# Patient Record
Sex: FEMALE | Race: BLACK OR AFRICAN AMERICAN | NOT HISPANIC OR LATINO | URBAN - METROPOLITAN AREA
[De-identification: names, ages, dates, MRNs, and addresses within clinical notes are randomized per-mention and may not be internally consistent; named-entity substitution may affect disease eponyms.]

---

## 2023-01-11 ENCOUNTER — INPATIENT (INPATIENT)
Facility: HOSPITAL | Age: 53
LOS: 57 days | Discharge: NURSING, REHAB OR LONG-TERM CARE | End: 2023-03-10
Attending: HOSPITALIST | Admitting: HOSPITALIST
Payer: MEDICAID

## 2023-01-11 VITALS
DIASTOLIC BLOOD PRESSURE: 92 MMHG | OXYGEN SATURATION: 100 % | SYSTOLIC BLOOD PRESSURE: 173 MMHG | TEMPERATURE: 98 F | HEART RATE: 85 BPM | RESPIRATION RATE: 16 BRPM

## 2023-01-11 PROCEDURE — 99285 EMERGENCY DEPT VISIT HI MDM: CPT

## 2023-01-11 NOTE — ED PROVIDER NOTE - PROGRESS NOTE DETAILS
Dr. Oscar Gonzales DO (ED ATTENDING):    I received signout on from this patient from Dr. York–53-year-old female with history of diabetes and bipolar disorder on lithium, brought in by EMS due to strange behavior, currently awaiting further collateral information to determine disposition.  I spoke with patient's cousin–Armaan (phone number [phone]), who said patient lives with her mother who is now , mother was the patient's primary caretaker and dispenses all of her medications to her.  Patient's father is  and patient has no siblings.  He is the most involved family member but says he does not think he is able to care for her at this time and is worried about the patient's ability to care for herself.  Cousin says that patient has been intermittently screaming with bizarre behavior, disturbing the neighbors and acting erratic and irritable at times.  On exam patient says she has not taken her medications since 2022, denies any SI or hallucinations at this time.    Psychiatry team called due to concern for patient's ability to care for herself due to psychiatric illness. Dr. Oscar Gonzales DO (ED ATTENDING):  patient seen by psych team and recommending admission for placement and restarting psych medications.  Labs showing Creatinine of 1.4- unclear if acute or chronic. Given PO fluids. Psych recommending trending Creatinine prior to restarting Lithium versus starting a new agent    TSH also elevated but clinically not in myxedema coma- no bradycardia, electrolyte abnormalities. Likely decompensated psych is primary issue    Will admit to medicine for further treatment and management of elevated Creatinine and further thyroid testing. psych to follow as consulting team

## 2023-01-11 NOTE — ED PROVIDER NOTE - PHYSICAL EXAMINATION
GEN: no acute respiratory distress. nontoxic, speaking comfortably in full sentences  HEENT: NCAT. face symmetrical. PERRL 4mm, EOMI, MMM, oropharynx wnl.  Neck: no JVD, trachea midline, no LAD  CV: RRR. +S1S2, no murmur. 2+ pulses in 4 extremities, cap refill <2 sec  Chest: CTA B/l. no wheezing, rales, rhonchi. no retractions. good air movement.   ABD: +BS, soft, non distended, non tender.   : no cva or suprapubic tenderness  MSK: No clubbing, cyanosis, edema. FROM of all extremities. no tenderness to palpation. No midline or paraspinal tenderness.   Neuro: AAOX3. Sensation intact, motor 5/5 throughout.   SKIN: No erythema, lesions or rash    flat affect, minimal conversant, no SI HI, hallucination, calm and cooperative

## 2023-01-11 NOTE — ED PROVIDER NOTE - CLINICAL SUMMARY MEDICAL DECISION MAKING FREE TEXT BOX
Patient calm in ED reportedly had abnormal behavior at home.  Per EMS no concerns in transport.  Patient's mother  earlier this evening.  Per EMS family in route to ED will obtain collateral when they arrive reassess.  As of now in no acute psychiatric concerns warranting emergent consultation or psychiatric admission.

## 2023-01-11 NOTE — ED PROVIDER NOTE - NS ED ROS FT
Constitutional: No fever. no chills.   Eyes: No visual changes, eye pain or redness  HEENT: No throat pain  CV: No chest pain, no palpitations  Resp: No shortness of breath, no cough  GI: No abdominal pain. No nausea. no  vomiting.   : No dysuria, hematuria. no urinary frequency, no urinary urgency.  MSK: No musculoskeletal pain  Skin: No rash  Neuro: No headache. No numbness or tingling. No weakness. No dizziness.  Allergy/Immunology: no allergy to medicine

## 2023-01-11 NOTE — ED ADULT TRIAGE NOTE - CHIEF COMPLAINT QUOTE
Pt. is brought to [facility] ED by NOREEN( [address] [facility]) for psychiatric issue. Pt.'s mother passed away at 1901. EMS states pt. was upset. They left and [facility] called EMS to come back. Family states that pt. was acting different. Brothers are on their way. Family states that the mother would give the pt. her medications. Pt. is non-verbal at triage. Unable to obtain history. . Pt. is on metformin. Spoke to  STEPHANIE Bass. Refering to adult main ED. Pt. is brought to [facility] ED by NOREEN( [address] [facility]) for psychiatric issue. Pt.'s mother passed away at 1901. EMS states pt. was upset. They left and [facility] called EMS to come back. Family states that pt. was acting different. Brothers are on their way. Family states that the mother would give the pt. her medications. Pt. is non-verbal at triage. Unable to obtain history.  at scene. Repeat  at triage.  Pt. is on metformin. Spoke to  STEPHANIE Bass. Refering to adult main ED.

## 2023-01-11 NOTE — ED PROVIDER NOTE - CARE PLAN
Principal Discharge DX:	Abnormal behavior   1 Principal Discharge DX:	Abnormal behavior  Secondary Diagnosis:	Elevated serum creatinine

## 2023-01-12 DIAGNOSIS — F31.62 BIPOLAR DISORDER, CURRENT EPISODE MIXED, MODERATE: ICD-10-CM

## 2023-01-12 DIAGNOSIS — E78.5 HYPERLIPIDEMIA, UNSPECIFIED: ICD-10-CM

## 2023-01-12 DIAGNOSIS — E03.9 HYPOTHYROIDISM, UNSPECIFIED: ICD-10-CM

## 2023-01-12 DIAGNOSIS — N17.9 ACUTE KIDNEY FAILURE, UNSPECIFIED: ICD-10-CM

## 2023-01-12 DIAGNOSIS — R46.89 OTHER SYMPTOMS AND SIGNS INVOLVING APPEARANCE AND BEHAVIOR: ICD-10-CM

## 2023-01-12 DIAGNOSIS — E11.9 TYPE 2 DIABETES MELLITUS WITHOUT COMPLICATIONS: ICD-10-CM

## 2023-01-12 DIAGNOSIS — Z29.9 ENCOUNTER FOR PROPHYLACTIC MEASURES, UNSPECIFIED: ICD-10-CM

## 2023-01-12 LAB
A1C WITH ESTIMATED AVERAGE GLUCOSE RESULT: 5.5 % — SIGNIFICANT CHANGE UP (ref 4–5.6)
ALBUMIN SERPL ELPH-MCNC: 4.1 G/DL — SIGNIFICANT CHANGE UP (ref 3.3–5)
ALP SERPL-CCNC: 123 U/L — HIGH (ref 40–120)
ALT FLD-CCNC: 14 U/L — SIGNIFICANT CHANGE UP (ref 4–33)
ANION GAP SERPL CALC-SCNC: 12 MMOL/L — SIGNIFICANT CHANGE UP (ref 7–14)
APAP SERPL-MCNC: <10 UG/ML — LOW (ref 15–25)
AST SERPL-CCNC: 25 U/L — SIGNIFICANT CHANGE UP (ref 4–32)
BASOPHILS # BLD AUTO: 0.06 K/UL — SIGNIFICANT CHANGE UP (ref 0–0.2)
BASOPHILS NFR BLD AUTO: 0.8 % — SIGNIFICANT CHANGE UP (ref 0–2)
BILIRUB SERPL-MCNC: <0.2 MG/DL — SIGNIFICANT CHANGE UP (ref 0.2–1.2)
BUN SERPL-MCNC: 10 MG/DL — SIGNIFICANT CHANGE UP (ref 7–23)
CALCIUM SERPL-MCNC: 9.8 MG/DL — SIGNIFICANT CHANGE UP (ref 8.4–10.5)
CHLORIDE SERPL-SCNC: 113 MMOL/L — HIGH (ref 98–107)
CO2 SERPL-SCNC: 21 MMOL/L — LOW (ref 22–31)
CORTIS AM PEAK SERPL-MCNC: 9.4 UG/DL — SIGNIFICANT CHANGE UP (ref 6–18.4)
CREAT SERPL-MCNC: 1.41 MG/DL — HIGH (ref 0.5–1.3)
EGFR: 45 ML/MIN/1.73M2 — LOW
EOSINOPHIL # BLD AUTO: 0.36 K/UL — SIGNIFICANT CHANGE UP (ref 0–0.5)
EOSINOPHIL NFR BLD AUTO: 4.8 % — SIGNIFICANT CHANGE UP (ref 0–6)
ESTIMATED AVERAGE GLUCOSE: 111 — SIGNIFICANT CHANGE UP
ETHANOL SERPL-MCNC: <10 MG/DL — SIGNIFICANT CHANGE UP
FLUAV AG NPH QL: SIGNIFICANT CHANGE UP
FLUBV AG NPH QL: SIGNIFICANT CHANGE UP
GLUCOSE BLDC GLUCOMTR-MCNC: 110 MG/DL — HIGH (ref 70–99)
GLUCOSE BLDC GLUCOMTR-MCNC: 221 MG/DL — HIGH (ref 70–99)
GLUCOSE SERPL-MCNC: 98 MG/DL — SIGNIFICANT CHANGE UP (ref 70–99)
HCT VFR BLD CALC: 37.1 % — SIGNIFICANT CHANGE UP (ref 34.5–45)
HGB BLD-MCNC: 11.9 G/DL — SIGNIFICANT CHANGE UP (ref 11.5–15.5)
IANC: 5.07 K/UL — SIGNIFICANT CHANGE UP (ref 1.8–7.4)
IMM GRANULOCYTES NFR BLD AUTO: 0.3 % — SIGNIFICANT CHANGE UP (ref 0–0.9)
LITHIUM SERPL-MCNC: <0.1 MMOL/L — LOW (ref 0.6–1.2)
LYMPHOCYTES # BLD AUTO: 1.49 K/UL — SIGNIFICANT CHANGE UP (ref 1–3.3)
LYMPHOCYTES # BLD AUTO: 20.1 % — SIGNIFICANT CHANGE UP (ref 13–44)
MCHC RBC-ENTMCNC: 27.9 PG — SIGNIFICANT CHANGE UP (ref 27–34)
MCHC RBC-ENTMCNC: 32.1 GM/DL — SIGNIFICANT CHANGE UP (ref 32–36)
MCV RBC AUTO: 86.9 FL — SIGNIFICANT CHANGE UP (ref 80–100)
MONOCYTES # BLD AUTO: 0.43 K/UL — SIGNIFICANT CHANGE UP (ref 0–0.9)
MONOCYTES NFR BLD AUTO: 5.8 % — SIGNIFICANT CHANGE UP (ref 2–14)
NEUTROPHILS # BLD AUTO: 5.07 K/UL — SIGNIFICANT CHANGE UP (ref 1.8–7.4)
NEUTROPHILS NFR BLD AUTO: 68.2 % — SIGNIFICANT CHANGE UP (ref 43–77)
NRBC # BLD: 0 /100 WBCS — SIGNIFICANT CHANGE UP (ref 0–0)
NRBC # FLD: 0 K/UL — SIGNIFICANT CHANGE UP (ref 0–0)
PLATELET # BLD AUTO: 283 K/UL — SIGNIFICANT CHANGE UP (ref 150–400)
POTASSIUM SERPL-MCNC: 3.6 MMOL/L — SIGNIFICANT CHANGE UP (ref 3.5–5.3)
POTASSIUM SERPL-SCNC: 3.6 MMOL/L — SIGNIFICANT CHANGE UP (ref 3.5–5.3)
PROT SERPL-MCNC: 7.2 G/DL — SIGNIFICANT CHANGE UP (ref 6–8.3)
RBC # BLD: 4.27 M/UL — SIGNIFICANT CHANGE UP (ref 3.8–5.2)
RBC # FLD: 13.6 % — SIGNIFICANT CHANGE UP (ref 10.3–14.5)
RSV RNA NPH QL NAA+NON-PROBE: SIGNIFICANT CHANGE UP
SALICYLATES SERPL-MCNC: <0.3 MG/DL — LOW (ref 15–30)
SARS-COV-2 RNA SPEC QL NAA+PROBE: SIGNIFICANT CHANGE UP
SODIUM SERPL-SCNC: 146 MMOL/L — HIGH (ref 135–145)
TOXICOLOGY SCREEN, DRUGS OF ABUSE, SERUM RESULT: SIGNIFICANT CHANGE UP
TSH SERPL-MCNC: 410 UIU/ML — HIGH (ref 0.27–4.2)
WBC # BLD: 7.43 K/UL — SIGNIFICANT CHANGE UP (ref 3.8–10.5)
WBC # FLD AUTO: 7.43 K/UL — SIGNIFICANT CHANGE UP (ref 3.8–10.5)

## 2023-01-12 PROCEDURE — 99255 IP/OBS CONSLTJ NEW/EST HI 80: CPT | Mod: GC

## 2023-01-12 PROCEDURE — 99223 1ST HOSP IP/OBS HIGH 75: CPT | Mod: GC

## 2023-01-12 PROCEDURE — 99285 EMERGENCY DEPT VISIT HI MDM: CPT | Mod: GC

## 2023-01-12 RX ORDER — DEXTROSE 50 % IN WATER 50 %
15 SYRINGE (ML) INTRAVENOUS ONCE
Refills: 0 | Status: DISCONTINUED | OUTPATIENT
Start: 2023-01-12 | End: 2023-01-20

## 2023-01-12 RX ORDER — GLUCAGON INJECTION, SOLUTION 0.5 MG/.1ML
1 INJECTION, SOLUTION SUBCUTANEOUS ONCE
Refills: 0 | Status: DISCONTINUED | OUTPATIENT
Start: 2023-01-12 | End: 2023-01-20

## 2023-01-12 RX ORDER — DEXTROSE 50 % IN WATER 50 %
25 SYRINGE (ML) INTRAVENOUS ONCE
Refills: 0 | Status: DISCONTINUED | OUTPATIENT
Start: 2023-01-12 | End: 2023-01-20

## 2023-01-12 RX ORDER — HYDROCORTISONE 20 MG
100 TABLET ORAL ONCE
Refills: 0 | Status: DISCONTINUED | OUTPATIENT
Start: 2023-01-12 | End: 2023-01-12

## 2023-01-12 RX ORDER — INSULIN LISPRO 100/ML
VIAL (ML) SUBCUTANEOUS AT BEDTIME
Refills: 0 | Status: DISCONTINUED | OUTPATIENT
Start: 2023-01-12 | End: 2023-01-20

## 2023-01-12 RX ORDER — ACETAMINOPHEN 500 MG
650 TABLET ORAL EVERY 6 HOURS
Refills: 0 | Status: DISCONTINUED | OUTPATIENT
Start: 2023-01-12 | End: 2023-03-10

## 2023-01-12 RX ORDER — HALOPERIDOL DECANOATE 100 MG/ML
5 INJECTION INTRAMUSCULAR EVERY 6 HOURS
Refills: 0 | Status: DISCONTINUED | OUTPATIENT
Start: 2023-01-12 | End: 2023-01-12

## 2023-01-12 RX ORDER — LEVOTHYROXINE SODIUM 125 MCG
1 TABLET ORAL
Qty: 0 | Refills: 0 | DISCHARGE

## 2023-01-12 RX ORDER — SODIUM CHLORIDE 9 MG/ML
1000 INJECTION, SOLUTION INTRAVENOUS
Refills: 0 | Status: DISCONTINUED | OUTPATIENT
Start: 2023-01-12 | End: 2023-01-20

## 2023-01-12 RX ORDER — HYDROCORTISONE 20 MG
50 TABLET ORAL EVERY 8 HOURS
Refills: 0 | Status: DISCONTINUED | OUTPATIENT
Start: 2023-01-12 | End: 2023-01-12

## 2023-01-12 RX ORDER — INSULIN LISPRO 100/ML
VIAL (ML) SUBCUTANEOUS
Refills: 0 | Status: DISCONTINUED | OUTPATIENT
Start: 2023-01-12 | End: 2023-01-20

## 2023-01-12 RX ORDER — LEVOTHYROXINE SODIUM 125 MCG
200 TABLET ORAL ONCE
Refills: 0 | Status: COMPLETED | OUTPATIENT
Start: 2023-01-12 | End: 2023-01-12

## 2023-01-12 RX ORDER — HYDROCORTISONE 20 MG
50 TABLET ORAL EVERY 8 HOURS
Refills: 0 | Status: COMPLETED | OUTPATIENT
Start: 2023-01-12 | End: 2023-01-13

## 2023-01-12 RX ORDER — LEVOTHYROXINE SODIUM 125 MCG
100 TABLET ORAL
Refills: 0 | Status: DISCONTINUED | OUTPATIENT
Start: 2023-01-13 | End: 2023-01-16

## 2023-01-12 RX ORDER — HALOPERIDOL DECANOATE 100 MG/ML
2 INJECTION INTRAMUSCULAR EVERY 6 HOURS
Refills: 0 | Status: DISCONTINUED | OUTPATIENT
Start: 2023-01-12 | End: 2023-02-27

## 2023-01-12 RX ORDER — DEXTROSE 50 % IN WATER 50 %
12.5 SYRINGE (ML) INTRAVENOUS ONCE
Refills: 0 | Status: DISCONTINUED | OUTPATIENT
Start: 2023-01-12 | End: 2023-01-20

## 2023-01-12 RX ORDER — HYDROCORTISONE 20 MG
100 TABLET ORAL ONCE
Refills: 0 | Status: COMPLETED | OUTPATIENT
Start: 2023-01-12 | End: 2023-01-12

## 2023-01-12 RX ADMIN — Medication 50 MILLIGRAM(S): at 22:06

## 2023-01-12 RX ADMIN — Medication 100 MILLIGRAM(S): at 14:22

## 2023-01-12 RX ADMIN — HALOPERIDOL DECANOATE 2 MILLIGRAM(S): 100 INJECTION INTRAMUSCULAR at 18:34

## 2023-01-12 RX ADMIN — Medication 200 MICROGRAM(S): at 18:08

## 2023-01-12 NOTE — H&P ADULT - HISTORY OF PRESENT ILLNESS
Ms. Alejandro is a 51 y/o F w/ PMH T2DM, bipolar (on lithium, domiciled w/ mother) who was BIBEMS for abnormal behavior reported by cousin. Pt's cousin Armaan reports that  Ms. Alejandro is a 53 y/o F w/ PMH T2DM, bipolar (on lithium, domiciled w/ mother) who was BIBEMS for abnormal behavior reported by cousin. [employer] from Pt's cousin Armaan. [employer] called cousin because they tried to drop off food at house but pt/mom not answering door. Armaan called pt about this who said pt's mom is "resting." Armaan then went in to check on house on , found pt's mom dead (likely  for 3 days) with pt bringing her blankets and pillows.    reports that he was called by [employer] because pt was not responding for meal drop off. Armaan stopped by house and noticed pt behaving irregularly. Pt  Ms. Alejandro is a 53 y/o F w/ PMH T2DM, bipolar (on lithium, domiciled w/ mother) who was BIBEMS for abnormal behavior reported by cousin. [employer] from Pt's cousin Armaan. [employer] called cousin because they tried to drop off food at house but pt/mom not answering door. Armaan called pt about this who said pt's mom is "resting." Armaan then went in to check on house on , found pt's mom dead (likely  for 3 days) with pt bringing her blankets and pillows. Armaan reports pt has outbursts of frustration but has never been aggressive or violent. Pt had a similar outburst after [employer] arrived yesterday. Cousin reports pt was giggling that mom was "resting" and that she did not grasp that mom was no longer alive. Per cousin, pt follows Dr. Jose Goins for her primary care needs. Armaan reports that he usually brings pt to doctor and recalls pt having a thyroid condition for which she takes a medication. Armaan is not sure when patient stopped taking her medications. Pt denies recent changes in weight, cold/heat intolerance, constipation, excessive fatigue, dry/brittle hair, hair loss.     Pt presented to the ED w/ 's-170's / 90's; HR 80's. Afebrile. On RA. Pt found to have elevated TSH to 410 w/ low T3 and fT4. Pt was evaluated by psych and recommended for inpatient psych treatment due to inability to care for ADL's; however, admitted to medicine first for abnormal TFT's.  Ms. Alejandro is a 53 y/o F w/ PMH T2DM, bipolar (on lithium, domiciled w/ mother) who was BIBEMS for abnormal behavior reported by cousin. [employer] from Pt's cousin Armaan. [employer] called cousin because they tried to drop off food at house but pt/mom not answering door. Armaan called pt about this who said pt's mom is "resting." Armaan then went in to check on house on , found pt's mom dead (likely  for 3 days) with pt bringing her blankets and pillows. Armaan reports pt has outbursts of frustration but has never been aggressive or violent. He reports despite psych history patient is normally oriented to person, place, time. Pt had a similar outburst after [facility] arrived yesterday. Cousin reports pt was giggling that mom was "resting" and that she did not grasp that mom was no longer alive. Per cousin, pt follows Dr. Jose Goins for her primary care needs. Armaan reports that he usually brings pt to doctor and recalls pt having a thyroid condition for which she takes a medication. Armaan is not sure when patient stopped taking her medications. Pt denies recent changes in weight, cold/heat intolerance, constipation, excessive fatigue, dry/brittle hair, hair loss.     Pt presented to the ED w/ 's-170's / 90's; HR 80's. Afebrile. On RA. Pt found to have elevated TSH to 410 w/ low T3 and fT4. Pt was evaluated by psych and recommended for inpatient psych treatment due to inability to care for ADL's; however, admitted to medicine first for abnormal TFT's.  Ms. Alejandro is a 53 y/o F w/ PMH T2DM, bipolar (on lithium, domiciled w/ mother) who was BIBEMS for abnormal behavior reported by cousin. [employer] from Pt's cousin Armaan. [employer] called cousin because they tried to drop off food at house but pt/mom not answering door. Armaan called pt about this who said pt's mom is "resting." Armaan then went in to check on house on , found pt's mom dead (likely  for 3 days) with pt bringing her blankets and pillows. Armaan reports pt has outbursts of frustration but has never been aggressive or violent. He reports despite psych history patient is normally oriented to person, place, time. Pt had a similar outburst after [facility] arrived yesterday. Cousin reports pt was giggling that mom was "resting" and that she did not grasp that mom was no longer alive. Per cousin, pt follows Dr. Jose Goins for her primary care needs. Armaan reports that he usually brings pt to doctor and recalls pt having a thyroid condition for which she takes a medication. Armaan is not sure when patient stopped taking her medications. Pt unreliable historian. Appears to be AOx0 on examination. When asked, pt denies recent changes in weight, cold/heat intolerance, constipation, excessive fatigue, dry/brittle hair, hair loss.     Pt presented to the ED w/ 's-170's / 90's; HR 80's. Afebrile. On RA. Pt found to have elevated TSH to 410 w/ low T3 and fT4. Pt was evaluated by psych and recommended for inpatient psych treatment due to inability to care for ADL's; however, admitted to medicine first for abnormal TFT's.

## 2023-01-12 NOTE — ED BEHAVIORAL HEALTH NOTE - BEHAVIORAL HEALTH NOTE
COVID Exposure Screen- Patient    1. *Have you had a COVID-19 test in the last 90 days? ( ) Yes (x ) No ( ) Unknown- Reason: _____    IF YES PROCEED TO QUESTION #2. IF NO OR UNKNOWN, PLEASE SKIP TO QUESTION #3.    2. Date of test(s) and result(s): ________    3. *Have you tested positive for COVID-19 antibodies? ( ) Yes ( ) No (x ) Unknown- Reason: _____    IF YES PROCEED TO QUESTION #4. IF NO or UNKNOWN, PLEASE SKIP TO QUESTION #5.    4. Date of positive antibody test: ________    5. *Have you received 2 doses of the COVID-19 vaccine? x( ) Yes ( ) No ( ) Unknown- Reason: _____    IF YES PROCEED TO QUESTION #6. IF NO or UNKNOWN, PLEASE SKIP TO QUESTION #7.    6. Date of second dose: ________    7. *In the past 10 days, have you been around anyone with a positive COVID-19 test?* ( ) Yes (x ) No ( ) Unknown- Reason: ____    IF YES PROCEED TO QUESTION #8. IF NO or UNKNOWN, PLEASE SKIP TO QUESTION #13.    8. Were you within 6 feet of them for at least 15 minutes? ( ) Yes ( ) No ( ) Unknown- Reason: _____    9. Have you provided care for them? ( ) Yes ( ) No ( ) Unknown- Reason: ______    10. Have you had direct physical contact with them (touched, hugged, or kissed them)? ( ) Yes ( ) No ( ) Unknown- Reason: _____    11. Have you shared eating or drinking utensils with them? ( ) Yes ( ) No ( ) Unknown- Reason: ____    12. Have they sneezed, coughed, or somehow gotten respiratory droplets on you? ( ) Yes ( ) No ( ) Unknown- Reason: ______    13. *Have you been out of [place] within the past 10 days?* ( ) Yes ( x) No ( ) Unknown- Reason: _____    IF YES PLEASE ANSWER THE FOLLOWING QUESTIONS:    14. Which state/country have you been to? ______    15. Were you there over 24 hours? ( ) Yes ( ) No ( ) Unknown- Reason: ______    16. Date of return to [facility]: ______

## 2023-01-12 NOTE — H&P ADULT - NSHPREVIEWOFSYSTEMS_GEN_ALL_CORE
REVIEW OF SYSTEMS:    *Pt unreliable historian  CONSTITUTIONAL: No weakness, fevers or chills. No cold/heat intolerance. No   EYES/ENT: No visual changes;  No vertigo or throat pain   NECK: No pain or stiffness  RESPIRATORY: No cough, wheezing, hemoptysis; No shortness of breath  CARDIOVASCULAR: No chest pain or palpitations  GASTROINTESTINAL: No abdominal or epigastric pain. No nausea, vomiting, or hematemesis; No diarrhea or constipation. No melena or hematochezia.  GENITOURINARY: No dysuria, frequency or hematuria  NEUROLOGICAL: No numbness or weakness  SKIN: No itching, rashes

## 2023-01-12 NOTE — ED BEHAVIORAL HEALTH ASSESSMENT NOTE - DESCRIPTION
per chart review, diabetes Pt calm and cooperative in ED Lived in home with mom, now . Per cousin, pt has no siblings or children. Her father passed away a long time ago. Pt's mom was her caretaker, pt unable to live on own. Pt calm and cooperative in ED, but is oddly related  Vital Signs Last 24 Hrs  T(C): 36.9 (12 Jan 2023 07:19), Max: 36.9 (12 Jan 2023 07:19)  T(F): 98.5 (12 Jan 2023 07:19), Max: 98.5 (12 Jan 2023 07:19)  HR: 85 (12 Jan 2023 07:19) (69 - 85)  BP: 168/93 (12 Jan 2023 07:19) (166/96 - 173/92)  BP(mean): --  RR: 17 (12 Jan 2023 07:19) (16 - 18)  SpO2: 100% (12 Jan 2023 07:19) (100% - 100%)    Parameters below as of 12 Jan 2023 07:19  Patient On (Oxygen Delivery Method): room air

## 2023-01-12 NOTE — ED ADULT NURSE REASSESSMENT NOTE - NS ED NURSE REASSESS COMMENT FT1
#22g placed to L wrist. Patient tolerated procedure well. Patient denies chest pain, headache and dizziness. Calm and cooperative at this time.
pt given gown to change into. pt went to restroom to change and clothing placed in belongings bags. pt has purse in possession. pt able to be redirected, but ambulates freely throughout department without telling staff. per MD Gonzales and psych no need for constant observation at this time
Patient at baseline mental status. Breathing even and unlabored. No pallor or diaphoresis noted at this time. Patient IV placed and medications given as per MD order. Awaiting to give report.
Patient at baseline mental status. Breathing even and unlabored. Noted to have flat affect however, calm and cooperative at this time. Patient awaiting bed placement.

## 2023-01-12 NOTE — CONSULT NOTE ADULT - ATTENDING COMMENTS
52F with severe hypothyroidism, impending myxedema coma but VS not grossly abnormal, however patient somewhat lethargic.  TFTs with severely high TSH and undetectable T4/T3.  Send STAT lab for cortisol, followed by a dose of IV hydrocortisone (follow up cortisol level to determine need for ongoing steroid). Then give levothyroxine 200mg IV x 1. Will follow closely.  High risk patient high level decision making.    Alvaro Jonas MD  Division of Endocrinology  Pager: [phone]    If after 6PM or before 9AM, or on weekends/holidays, please call endocrine answering service for assistance ([phone]).  For nonurgent matters email [email or web address] for assistance.

## 2023-01-12 NOTE — ED BEHAVIORAL HEALTH ASSESSMENT NOTE - NS ED BHA PLAN MSU PSYCHIATRIC ISSUES2 FT
Would hold lithium now, consider switching to another mood stabilizer. Pt is likely been on Lithium for many years, which possibly impacting kidney function . Cl will follow up with initiate standing medications. For agitation: Haldol 5mg /Ativan 2mg po/im q6hrs prn. check ecg , hold for qtc >500msec.

## 2023-01-12 NOTE — ED ADULT NURSE NOTE - OBJECTIVE STATEMENT
pt received to spot 3a brought in by EMS for behavioral changes. labs drawn and sent. pt not answering questions, inially refusing labs. able to be redirected. labs drawn and sent. no IV at this time. pt received to spot 3a brought in by EMS for behavioral changes. pt not answering questions, inially refusing labs. able to be redirected. labs drawn and sent. no IV at this time.

## 2023-01-12 NOTE — H&P ADULT - NSHPLABSRESULTS_GEN_ALL_CORE
11.9   7.43  )-----------( 283      ( 12 Jan 2023 05:30 )             37.1       01-12    146<H>  |  113<H>  |  10  ----------------------------<  98  3.6   |  21<L>  |  1.41<H>    Ca    9.8      12 Jan 2023 05:30    TPro  7.2  /  Alb  4.1  /  TBili  <0.2  /  DBili  x   /  AST  25  /  ALT  14  /  AlkPhos  123<H>  01-12            CAPILLARY BLOOD GLUCOSE      POCT Blood Glucose.: 122 mg/dL (11 Jan 2023 23:23)

## 2023-01-12 NOTE — BH CONSULTATION LIAISON PROGRESS NOTE - NSBHATTESTAPPAMEND_PSY_A_CORE
I have personally seen and examined this patient. I fully participated in the care of this patient. I have made amendments to the documentation where appropriate and otherwise agree with the history, physical exam, and plan as documented by the JACQUELIN

## 2023-01-12 NOTE — ED BEHAVIORAL HEALTH ASSESSMENT NOTE - DEPENDENTS
Last refill- 10/11/19  Last office visit - 10/24/2019  Next office visit - No future appointments.       Requested Prescriptions     Pending Prescriptions Disp Refills    clonazePAM (KLONOPIN) 0.5 mg tablet 45 Tab 0     Sig: TAKE 1 & 1/2 TABLET BY MOUTH EVERY NIGHT AT BEDTIME AS NEEDED None known

## 2023-01-12 NOTE — H&P ADULT - PROBLEM SELECTOR PLAN 4
- DVT: Lovenox 40mg qd  - Diet: DASH/CC On metformin at home. Unknown A1c  - Obtain A1c  - ISS for now

## 2023-01-12 NOTE — H&P ADULT - PROBLEM SELECTOR PLAN 1
Pt with hypothyroidism reported to be on synthroid 125ug qd at home. Likely non-adherent to medication. Presentation c/f impending myxedema coma.  - On presentation TSH=410; T3<30; fT4<0.3.   - AMS/lethargy concerning for neurologic manifestation for impending myxedema coma. Endocrine consulted  - Endocrine recs appreciated: Stat TPO, cortisol level. After labs 100mg IV solu-cortef. 1 hour later 200mg IV synthroid. 100mg qd starting tmrw. Obtain free T4 level sunday  - Avoid beta blockers.

## 2023-01-12 NOTE — ED BEHAVIORAL HEALTH NOTE - BEHAVIORAL HEALTH NOTE
Called bessiesin Armaan at [phone]. He says pt is diagnosed with bipolar disorder and her mom was primary caretaker. [employer] called cousin because they tried to drop off food at house but pt/mom not answering door. Armaan called pt about this who said pt's mom is "resting." Armaan then went in to check on house on , found pt's mom dead (likely  for 3 days) with pt bringing her blankets and pillows. Armaan concerned pt has not taken meds for days. Pt not capable of cooking and taking her meds on her own, but can bathe self. Believes that pt still thinks her mom is alive because she said "I don't want to bother her she is resting." Pt reportedly not able to give answers to Armaan or police. When police asked her name she couldn't give it. When EMS came she was yelling and agitated and cursing as if there was someone in the room, and conversing with ppl in the room that weren't there. Armaan believes that pt has conversations with herself at baseline, often arguing with herself. Has not noticed anything out of the ordinary w/pt prior to today.    Armaan reports that pt's father passed away years ago. Reports that pt was only living with her mom in the house, and has no children or siblings.   Denies violence hx, does not know about suicidality. Doesn't know medical hx or what meds she takes but knows she sees Dr. Jose Goins in [place] at [phone].

## 2023-01-12 NOTE — ED BEHAVIORAL HEALTH ASSESSMENT NOTE - RISK ASSESSMENT
Risk factors: mood disorder, likely nonadherent to medications, recent family death  Protective factors: has a provider, has relationship with cousin, no substance use, appears to have residential stability, no known SA or violent behavior

## 2023-01-12 NOTE — ED BEHAVIORAL HEALTH ASSESSMENT NOTE - HPI (INCLUDE ILLNESS QUALITY, SEVERITY, DURATION, TIMING, CONTEXT, MODIFYING FACTORS, ASSOCIATED SIGNS AND SYMPTOMS)
Pt is 52-year-old female domiciled w/mom (now ) who was pt's primary caretaker, w/PPHx Bipolar disorder on lithium and past medical history of diabetes on metformin, who was BIBEMS after appearing abnormal to family. On evaluation, pt was cooperative with exam although very disorganized and unable to fully engage in conversation. Appears limited and not a good historian. When asking pt what brought her here, she paused and looked confused and mumbled "the ambulance." Pt could not answer the reason why she is in the hospital after multiple attempts, says "because I fell." When confronted about her family being concerned about her, she does not answer. She periodically looks for things in her purse, when asked what she is doing she says "you asked my why I am here. I am looking for my insurance card." Pt was very distracted during interview, often looking around and at one point says "I am annoyed because the phone keeps ringing and I don't know who it is." Pt denies psychotic sx such as AVH and paranoia as well as denies manic sx such as decreased need for sleep, elevated mood, increased energy and reckless behaviors. She does endorse feeling depressed for the past few weeks with poor sleep and poor concentration but denies SI, anhedonia and poor appetite. Pt also denies HI. Pt does endorse feeling anxious "for months" and nods her head to feeling more restless, fatigued, irritable and w/racing thoughts. She denies any substance use such as alcohol, cocaine, MJ.     When asked who she lives with, she responds "my mom and dad" (but per cousin both are ). When asked about her past psych hx pt only mentions that she sees a psychiatrist that has been prescribing her lithium that she takes 3xday. When asked why she takes lithium she says "because mom and dad wanted me to." She in unsure about any psychiatric or medical diagnoses and responds unrelatedly by saying "I found a weather channel." Pt is 52-year-old female domiciled w/mom (now ) who was pt's primary caretaker, w/PPHx Bipolar disorder on lithium and past medical history of diabetes on metformin, who was BIBEMS after appearing abnormal to family. no hx is found in psyckes. Patient was brought in to ED for evaluation , after pt's cousin discovered last evening that  pt's mom dead   pt was acting strange bringing blankets to cover her  mom saying she is sleeping . As per report pt's mother likely passed away several days ago before she was discovered   in the house. (see collateral in bh note )    On evaluation, pt was cooperative with exam although very disorganized and unable to fully engage in conversation. Appears limited and not a good historian. When asking pt what brought her here, she paused and looked confused and mumbled "the ambulance." Pt could not answer the reason why she is in the hospital after multiple attempts, says "because I fell." When confronted about her family being concerned about her, she does not answer. She periodically looks for things in her purse, when asked what she is doing she says "you asked my why I am here. I am looking for my insurance card." Pt was very distracted during interview, often looking around and at one point says "I am annoyed because the phone keeps ringing and I don't know who it is." Pt denies psychotic sx such as AVH and paranoia as well as denies manic sx such as decreased need for sleep, elevated mood, increased energy and reckless behaviors. She does endorse feeling depressed for the past few weeks with poor sleep and poor concentration but denies SI, anhedonia and poor appetite. Pt also denies HI. Pt does endorse feeling anxious "for months" and nods her head to feeling more restless, fatigued, irritable and w/racing thoughts. She denies any substance use such as alcohol, cocaine, MJ.     When asked who she lives with, she responds "my mom and dad" (but per cousin both are ). When asked about her past psych hx pt only mentions that she sees a psychiatrist that has been prescribing her lithium that she takes 3xday. When asked why she takes lithium she says "because mom and dad wanted me to." She in unsure about any psychiatric or medical diagnoses and responds unrelated by saying "I found a weather channel."

## 2023-01-12 NOTE — H&P ADULT - PROBLEM SELECTOR PLAN 2
Patient with bipolar disease on lithium  - Low lithium levels on labs. Likely non-adherent to medications  - Psych consulted. f/u recs for mood stabilization  - Psych recs appreciated: Haldol 5mg /Ativan 2mg po/im q6hrs prn  - Will likely need inpatient psych admission/treatment as pt unable to care for ADL's.

## 2023-01-12 NOTE — H&P ADULT - PROBLEM SELECTOR PLAN 3
On metformin at home. Unknown A1c  - Obtain A1c  - ISS for now Pt w/ SCr 1.4 on admission. Unclear baseline. Possibly pre-renal. Pt may have low PO after mother passed d/t inability to care for her ADL's independently.   - Will send urine studies  - Trend BMP

## 2023-01-12 NOTE — H&P ADULT - NSHPSOCIALHISTORY_GEN_ALL_CORE
Pt used to live at home with mother who helped take care of all ADL's including medications. Pt w/ regular outbursts but reported to be non-violent, non-aggressive. Pt unreliable historian but denies use of tobacco products including smoking, abdoulaye alcohol use, drug use.

## 2023-01-12 NOTE — ED BEHAVIORAL HEALTH ASSESSMENT NOTE - NSBHATTESTCOMMENTATTENDFT_PSY_A_CORE
Pt is 52-year-old female domiciled w/mom (now ) who was pt's primary caretaker, w/PPHx Bipolar disorder on lithium and past medical history of diabetes on metformin, who was BIBEMS after appearing abnormal to family. no hx is found in psyckes. Patient was brought in to ED for evaluation , after pt's cousin discovered last evening that  pt's mom dead   pt was acting strange bringing blankets to cover her  mom saying she is sleeping . As per report pt's mother likely passed away several days ago before she was discovered   in the house. (see collateral in bh note )    On evaluation, pt was very disorganized, unable to fully engage in conversation, internally preoccupied, and often digging through purse. Pt appeared very confused, not understanding why she is in the hospital and not understanding that her mom is . Pt however is calm and denies SI, HI and AVH. After speaking to collateral (cousin Armaan), pt responds to internal stimuli at baseline and often has agitated outbursts but is not violent. Suspect pt is likely with psychotic d/o at baseline . However, pt usually does not seem this out of touch with reality- believing her mom and dad are still alive. Pt reportedly with a long hx of bipolar disorder on lithium, likely has not been taking medications . At this time, pt is not able to care for herself and will need further care. Given concern that this is a psychiatric decompensation from pt's baseline (vs grief reaction), pt will likely require psychiatric hospitalization.    addendum : Patient's lab resulted in abnormal tsh and cr level. Pt will require medical admission for further work up and stabilization. Psychiatric cl team will follow up with the pt .

## 2023-01-12 NOTE — ED BEHAVIORAL HEALTH ASSESSMENT NOTE - SUMMARY
Pt is 52-year-old female domiciled w/mom (now ) who was pt's primary caretaker, w/PPHx Bipolar disorder on lithium and past medical history of diabetes on metformin, who was BIBEMS after appearing abnormal to family. On evaluation, pt was very disorganized, unable to fully engage in conversation, internally preoccupied, and often digging through purse. Pt appeared very confused, not understanding why she is in the hospital and not understanding that her mom is . Pt however is calm and denies SI, HI and AVH. After speaking to collateral (cousin Armaan), pt responds to internal stimuli at baseline and often has agitated outbursts but is not violent. However, pt usually does not seem this out of touch with reality- believing her mom and dad are still alive. Pt reportedly with a long hx of bipolar disorder on lithium, likely has not been taking medications for past few days since her mom's death. At this time, pt is not able to care for herself and will need further care. Given concern that this is a psychiatric decompensation from pt's baseline (vs grief reaction), pt will likely require psychiatric hospitalization. Pt is 52-year-old female domiciled w/mom (now ) who was pt's primary caretaker, w/PPHx Bipolar disorder on lithium and past medical history of diabetes on metformin, who was BIBEMS after appearing abnormal to family. no hx is found in psyckes. Patient was brought in to ED for evaluation , after pt's cousin discovered last evening that  pt's mom dead   pt was acting strange bringing blankets to cover her  mom saying she is sleeping . As per report pt's mother likely passed away several days ago before she was discovered   in the house. (see collateral in bh note )    On evaluation, pt was very disorganized, unable to fully engage in conversation, internally preoccupied, and often digging through purse. Pt appeared very confused, not understanding why she is in the hospital and not understanding that her mom is . Pt however is calm and denies SI, HI and AVH. After speaking to collateral (cousin Armaan), pt responds to internal stimuli at baseline and often has agitated outbursts but is not violent. Suspect pt is likely with psychotic d/o at baseline . However, pt usually does not seem this out of touch with reality- believing her mom and dad are still alive. Pt reportedly with a long hx of bipolar disorder on lithium, likely has not been taking medications . At this time, pt is not able to care for herself and will need further care. Given concern that this is a psychiatric decompensation from pt's baseline (vs grief reaction), pt will likely require psychiatric hospitalization.    addendum : Patient's lab resulted in abnormal tsh and cr level. Pt will require medical admission for further work up and stabilization. Psychiatric cl team will follow up with the pt .

## 2023-01-12 NOTE — ED ADULT NURSE NOTE - CHIEF COMPLAINT QUOTE
Pt. is brought to [facility] ED by [facility]( [address]) for psychiatric issue. Pt.'s mother passed away at 1901. EMS states pt. was upset. They left and [facility] called EMS to come back. Family states that pt. was acting different. Brothers are on their way. Family states that the mother would give the pt. her medications. Pt. is non-verbal at triage. Unable to obtain history.  at scene. Repeat  at triage.  Pt. is on metformin. Spoke to  STEPHANIE Bass. Refering to adult main ED.

## 2023-01-12 NOTE — ED BEHAVIORAL HEALTH ASSESSMENT NOTE - OTHER PAST PSYCHIATRIC HISTORY (INCLUDE DETAILS REGARDING ONSET, COURSE OF ILLNESS, INPATIENT/OUTPATIENT TREATMENT)
reports being on lithium since 1992. Per cousin, since she was in high school. Also per cousin, diagnosed with bipolar disorder.

## 2023-01-12 NOTE — CONSULT NOTE ADULT - SUBJECTIVE AND OBJECTIVE BOX
CHIEF COMPLAINT:    HPI:    PAST MEDICAL & SURGICAL HISTORY:  Bipolar disorder    No significant past surgical history    FAMILY HISTORY:  Unable to obtain 2/2 unclear answers from patients     SOCIAL HISTORY:  Unable to obtain 2/2 unclear answers from patients     Allergies    No Known Allergies    Intolerances    HOME MEDICATIONS:  (Per Surescripts)   - Rosuvastatin 10   - olanzapine 15  - metformin 500 QD   - Lithium 300 TID   - Levothyroxine 125mcg   - Benztropine 1mg     REVIEW OF SYSTEMS:  CONSTITUTIONAL: No weakness, fevers, chills, sick contacts, or unintended weight loss  EYES: No visual changes or vertigo  ENT: No throat pain, rhinorrhea, or hearing loss   NECK: No pain or stiffness  RESPIRATORY: No cough, wheezing, hemoptysis; No shortness of breath  CARDIOVASCULAR: No chest pain or palpitations  GASTROINTESTINAL: No abdominal or epigastric pain. No nausea, vomiting, or hematemesis; No diarrhea or constipation. No melena or hematochezia.  GENITOURINARY: No dysuria, frequency or hematuria  NEUROLOGICAL: No numbness or weakness  SKIN: +dry skin     OBJECTIVE:  ICU Vital Signs Last 24 Hrs  T(C): 37 (12 Jan 2023 09:00), Max: 37 (12 Jan 2023 09:00)  T(F): 98.6 (12 Jan 2023 09:00), Max: 98.6 (12 Jan 2023 09:00)  HR: 87 (12 Jan 2023 09:00) (69 - 87)  BP: 144/90 (12 Jan 2023 09:00) (144/90 - 173/92)  BP(mean): --  ABP: --  ABP(mean): --  RR: 17 (12 Jan 2023 09:00) (16 - 18)  SpO2: 100% (12 Jan 2023 09:00) (100% - 100%)    O2 Parameters below as of 12 Jan 2023 09:00  Patient On (Oxygen Delivery Method): room air              CAPILLARY BLOOD GLUCOSE      POCT Blood Glucose.: 122 mg/dL (11 Jan 2023 23:23)      PHYSICAL EXAM:  GENERAL: NAD, well-groomed, well-developed  HEAD:  Atraumatic, Normocephalic; mild facial edema, slight periorbital edema   EYES: EOMI, PERRLA, conjunctiva and sclera clear  ENMT: Noted dry, shiny skin w/ hyperpigmentation, noted palpable thyroid. No tongue edema.  NECK: Supple, No JVD, Normal thyroid  CHEST/LUNG: Clear to auscultation bilaterally; No rales, rhonchi, wheezing, or rubs  HEART: Regular rate and rhythm; No murmurs, rubs, or gallops  ABDOMEN: Soft, Nontender, Nondistended; Bowel sounds present  EXTREMITIES:  2+ Peripheral Pulses. Slight edema b/l. B/L dry shiny, and hyperpigmented skin   NERVOUS SYSTEM:  Alert & Oriented X2; date and place, poor concentration; PSYCH: Blunted affect. muffled speech. Answers questions appropriately     HOSPITAL MEDICATIONS:  MEDICATIONS  (STANDING):  hydrocortisone sodium succinate Injectable 100 milliGRAM(s) IV Push once    MEDICATIONS  (PRN):  acetaminophen     Tablet .. 650 milliGRAM(s) Oral every 6 hours PRN Temp greater or equal to 38C (100.4F), Mild Pain (1 - 3)  haloperidol    Injectable 5 milliGRAM(s) IntraMuscular every 6 hours PRN Agitation  LORazepam   Injectable 2 milliGRAM(s) IntraMuscular every 6 hours PRN Agitation      LABS:                        11.9   7.43  )-----------( 283      ( 12 Jan 2023 05:30 )             37.1     01-12    146<H>  |  113<H>  |  10  ----------------------------<  98  3.6   |  21<L>  |  1.41<H>    Ca    9.8      12 Jan 2023 05:30    TPro  7.2  /  Alb  4.1  /  TBili  <0.2  /  DBili  x   /  AST  25  /  ALT  14  /  AlkPhos  123<H>  01-12        MICROBIOLOGY:     RADIOLOGY:  [ ] Reviewed and interpreted by me    EKG: CHIEF COMPLAINT:    Admission HPI: Ms. Alejandro is a 51 y/o F w/ PMH T2DM, bipolar (on lithium, domiciled w/ mother) who was BIBEMS for abnormal behavior reported by cousin. Colaltoral from Pt's cousin Armaan. [employer]als [employer] wheels called cousin because they tried to drop off food at house but pt/mom not answering door. Armaan called pt about this who said pt's mom is "resting." Armaan then went in to check on house on , found pt's mom dead (likely  for 3 days) with pt bringing her blankets and pillows. Armaan reports pt has outbursts of frustration but has never been aggressive or violent. He reports despite psych history patient is normally oriented to person, place, time. Pt had a similar outburst after [employer] arrived yesterday. Cousin reports pt was giggling that mom was "resting" and that she did not grasp that mom was no longer alive. Per cousin, pt follows Dr. Jose Goins for her primary care needs. Armaan reports that he usually brings pt to doctor and recalls pt having a thyroid condition for which she takes a medication. Armaan is not sure when patient stopped taking her medications. Pt denies recent changes in weight, cold/heat intolerance, constipation, excessive fatigue, dry/brittle hair, hair loss.     Pt presented to the ED w/ 's-170's / 90's; HR 80's. Afebrile. On RA. Pt found to have elevated TSH to 410 w/ low T3 and fT4. Pt was evaluated by psych and recommended for inpatient psych treatment due to inability to care for ADL's; however, admitted to medicine first for abnormal TFT's.     Interval/Confirmatory HPI:  Patient also has DM2, Bipolar, HLD, and Hypothyroidism. Per surescripts collateral pt just on metformin 500; 125mcg levothyroxine but patient reports that she hasn't taken it in a long time though reportedly does take her lithium three times a day regularly. She does not know why she is in the hospital but knows the date and the general hospital setting but is slow to respond regarding her name. She garbles most of her speech but she cannot answer the last thing she remembers before being taken to the hospital. At this time, no subjective feeling of swelling anywhere in her body, recent changes in weight, cold/heat intolerance, constipation, excessive fatigue, dry/brittle hair, hair loss.     PAST MEDICAL & SURGICAL HISTORY:  Bipolar disorder    No significant past surgical history    FAMILY HISTORY:  Unable to obtain 2/2 unclear answers from patients     SOCIAL HISTORY:  Unable to obtain 2/2 unclear answers from patients     Allergies    No Known Allergies    Intolerances    HOME MEDICATIONS:  (Per Surescripts)   - Rosuvastatin 10   - olanzapine 15  - metformin 500 QD   - Lithium 300 TID   - Levothyroxine 125mcg   - Benztropine 1mg     REVIEW OF SYSTEMS:  CONSTITUTIONAL: No weakness, fevers, chills, sick contacts, or unintended weight loss  EYES: No visual changes or vertigo  ENT: No throat pain, rhinorrhea, or hearing loss   NECK: No pain or stiffness  RESPIRATORY: No cough, wheezing, hemoptysis; No shortness of breath  CARDIOVASCULAR: No chest pain or palpitations  GASTROINTESTINAL: No abdominal or epigastric pain. No nausea, vomiting, or hematemesis; No diarrhea or constipation. No melena or hematochezia.  GENITOURINARY: No dysuria, frequency or hematuria  NEUROLOGICAL: No numbness or weakness  SKIN: +dry skin     OBJECTIVE:  ICU Vital Signs Last 24 Hrs  T(C): 37 (2023 09:00), Max: 37 (2023 09:00)  T(F): 98.6 (2023 09:00), Max: 98.6 (2023 09:00)  HR: 87 (2023 09:00) (69 - 87)  BP: 144/90 (2023 09:00) (144/90 - 173/92)  BP(mean): --  ABP: --  ABP(mean): --  RR: 17 (2023 09:00) (16 - 18)  SpO2: 100% (2023 09:00) (100% - 100%)    O2 Parameters below as of 2023 09:00  Patient On (Oxygen Delivery Method): room air      CAPILLARY BLOOD GLUCOSE      POCT Blood Glucose.: 122 mg/dL (2023 23:23)      PHYSICAL EXAM:  GENERAL: NAD, well-groomed, well-developed  HEAD:  Atraumatic, Normocephalic; mild facial edema, slight periorbital edema   EYES: EOMI, PERRLA, conjunctiva and sclera clear  ENMT: Noted dry, shiny skin w/ hyperpigmentation, noted palpable thyroid. No tongue edema.  NECK: Supple, No JVD, Normal thyroid  CHEST/LUNG: Clear to auscultation bilaterally; No rales, rhonchi, wheezing, or rubs  HEART: Regular rate and rhythm; No murmurs, rubs, or gallops  ABDOMEN: Soft, Nontender, Nondistended; Bowel sounds present  EXTREMITIES:  2+ Peripheral Pulses. Slight edema b/l. B/L dry shiny, and hyperpigmented skin   NERVOUS SYSTEM:  Alert & Oriented X2; date and place, poor concentration; PSYCH: Blunted affect. muffled speech. Answers questions appropriately     HOSPITAL MEDICATIONS:  MEDICATIONS  (STANDING):  hydrocortisone sodium succinate Injectable 100 milliGRAM(s) IV Push once    MEDICATIONS  (PRN):  acetaminophen     Tablet .. 650 milliGRAM(s) Oral every 6 hours PRN Temp greater or equal to 38C (100.4F), Mild Pain (1 - 3)  haloperidol    Injectable 5 milliGRAM(s) IntraMuscular every 6 hours PRN Agitation  LORazepam   Injectable 2 milliGRAM(s) IntraMuscular every 6 hours PRN Agitation      LABS:                        11.9   7.43  )-----------( 283      ( 2023 05:30 )             37.1     -12    146<H>  |  113<H>  |  10  ----------------------------<  98  3.6   |  21<L>  |  1.41<H>    Ca    9.8      2023 05:30    TPro  7.2  /  Alb  4.1  /  TBili  <0.2  /  DBili  x   /  AST  25  /  ALT  14  /  AlkPhos  123<H>  -12        MICROBIOLOGY:     RADIOLOGY:  [ ] Reviewed and interpreted by me    EKG:

## 2023-01-12 NOTE — H&P ADULT - NSHPPHYSICALEXAM_GEN_ALL_CORE
Vital Signs Last 24 Hrs  T(C): 37 (12 Jan 2023 09:00), Max: 37 (12 Jan 2023 09:00)  T(F): 98.6 (12 Jan 2023 09:00), Max: 98.6 (12 Jan 2023 09:00)  HR: 87 (12 Jan 2023 09:00) (69 - 87)  BP: 144/90 (12 Jan 2023 09:00) (144/90 - 173/92)  BP(mean): --  RR: 17 (12 Jan 2023 09:00) (16 - 18)  SpO2: 100% (12 Jan 2023 09:00) (100% - 100%)    Parameters below as of 12 Jan 2023 09:00  Patient On (Oxygen Delivery Method): room air      PHYSICAL EXAM:  GENERAL: NAD, well developed  HEAD:  Atraumatic, Normocephalic  EYES: EOMI, PERRLA, conjunctiva and sclera clear  ENMT: No tonsillar erythema, exudates, or enlargement; Moist mucous membranes  NECK: Supple, No JVD, Normal thyroid  HEART: Regular rate and rhythm; No murmurs, rubs, or gallops  RESPIRATORY: CTA B/L, No W/R/R  ABDOMEN: Soft, Nontender, Nondistended; Bowel sounds present  NEUROLOGY: A&Ox3, nonfocal, moving all extremities  EXTREMITIES:  2+ Peripheral Pulses, No clubbing, cyanosis, or edema  SKIN: warm, No lesions. red markings lower extremity appears to be made with marker. Dry/scaly skin. Vital Signs Last 24 Hrs  T(C): 37 (12 Jan 2023 09:00), Max: 37 (12 Jan 2023 09:00)  T(F): 98.6 (12 Jan 2023 09:00), Max: 98.6 (12 Jan 2023 09:00)  HR: 87 (12 Jan 2023 09:00) (69 - 87)  BP: 144/90 (12 Jan 2023 09:00) (144/90 - 173/92)  BP(mean): --  RR: 17 (12 Jan 2023 09:00) (16 - 18)  SpO2: 100% (12 Jan 2023 09:00) (100% - 100%)    Parameters below as of 12 Jan 2023 09:00  Patient On (Oxygen Delivery Method): room air      PHYSICAL EXAM:  GENERAL: NAD, well developed  HEAD:  Atraumatic, Normocephalic  EYES: EOMI, PERRLA, conjunctiva and sclera clear  ENMT: No tonsillar erythema, exudates, or enlargement; Moist mucous membranes  NECK: Supple, No JVD, Normal thyroid  HEART: Regular rate and rhythm; No murmurs, rubs, or gallops  RESPIRATORY: CTA B/L, No W/R/R  ABDOMEN: Soft, Nontender, Nondistended; Bowel sounds present  NEUROLOGY: A&Ox0, nonfocal, moving all extremities  EXTREMITIES:  2+ Peripheral Pulses, No clubbing, cyanosis, or edema  SKIN: warm, No lesions. red markings lower extremity appears to be made with marker. Dry/scaly skin.

## 2023-01-12 NOTE — CONSULT NOTE ADULT - ASSESSMENT
52 F Hx T2DM on metformin, bipolar (on lithium, domiciled w/ mother), HLD, Hypothyroidism, adm for AMS w/ endocrinology consulted for question of Myxedema Coma    Severe Hypothyroidism   Pt on Levothyroxine 125mcg per surescripts recently filled but per patient has not been taking it for a long time; likely correlated given lithium level also undetectable. Will need later collateral for TFTs on stable dosing. Less likely 2/2 Lithium; possibly i/s/o nonadherence. Overall patient w/ severe hypothyroidism w/ pending myxedema coma.   - TSH: 410. T3 total: <30. Free Thyroxine: <0.3   - Physical exam noted for ams though hard to delineate from known psychiatric baseline disease, Mild facial swelling and thyroid enlargement. No overt signs of adrenal insufficiency given afebrile/normotension/normocardia.     Recommendation:   - Stat: AM cortisol, TPO Ab.   - After lab draws; Give IV 100mg hydrocortisone stat; then after 1 hr give IV 200mcg levothyroxine and then IV 100mcg QD (weight based). On 1/15/23; check Free T4 for response    T2DM  - On metformin 500QD per surescripts  - Stat A1c   - Low ISS, FS qACHS    HLD   - On rosuvastatin 10mg per surescripts   - would check lipid panel outpatient once thyroid levels have stabilized 2/2 associated dyslipidemia 52 F Hx T2DM on metformin, bipolar (on lithium, domiciled w/ mother), HLD, Hypothyroidism, adm for AMS w/ endocrinology consulted for question of Myxedema Coma    Severe Hypothyroidism   Pt on Levothyroxine 125mcg per surescripts recently filled but per patient has not been taking it for a long time; likely correlated given lithium level also undetectable. Will need later collateral for TFTs on stable dosing. Less likely 2/2 acute Lithium; possibly i/s/o  levothyroxine nonadherence. Overall patient w/ severe hypothyroidism possibly pending myxedema coma.   - TSH: 410. T3 total: <30. Free Thyroxine: <0.3   - Physical exam noted for ams though hard to delineate from known psychiatric baseline disease, Mild facial swelling and thyroid enlargement w/ areas of hyperpigmentation around neck and legs. No overt signs of adrenal insufficiency given afebrile/normotension/normocardia.     Recommendation:   - Stat: AM cortisol, TPO Ab.   - After lab draws; Give IV 100mg hydrocortisone stat; then after 1 hr give IV 200mcg levothyroxine and then IV 100mcg QD (weight based). On 1/15/23; check Free T4 for response    T2DM  - On metformin 500QD per surescripts  - Stat A1c   - Low ISS, FS qACHS    HLD   - On rosuvastatin 10mg per surescripts   - would check lipid panel outpatient once thyroid levels have stabilized 2/2 associated dyslipidemia 52 F Hx T2DM on metformin, bipolar (on lithium, domiciled w/ mother), HLD, Hypothyroidism, adm for AMS w/ endocrinology consulted for question of Myxedema Coma    Severe Hypothyroidism   Pt on Levothyroxine 125mcg per surescripts recently filled but per patient has not been taking it for a long time; likely correlated given lithium level also undetectable. Will need later collateral for TFTs on stable dosing. Less likely 2/2 acute Lithium; possibly i/s/o  levothyroxine nonadherence. Overall patient w/ severe hypothyroidism possibly impending myxedema coma.   - TSH: 410. T3 total: <30. Free Thyroxine: <0.3   - Physical exam noted for ams though hard to delineate from known psychiatric baseline disease, Mild facial swelling and thyroid enlargement w/ areas of hyperpigmentation around neck and legs. No overt signs of adrenal insufficiency given afebrile/normotension/normocardia.     Recommendation:   - Stat: AM cortisol, TPO Ab.   - After lab draws; Give IV 100mg hydrocortisone stat; then after 1 hr give IV 200mcg levothyroxine and then IV 100mcg daily starting tomorrow 1/13/23 (weight based). On 1/15/23; check Free T4 for response    T2DM  - On metformin 500 daily per surescripts  - Stat A1c   - Low ISS, FS qACHS    HLD   - On rosuvastatin 10mg per surescripts   - would check lipid panel outpatient once thyroid levels have stabilized 2/2 associated dyslipidemia 52 F Hx T2DM on metformin, bipolar (on lithium, domiciled w/ mother), HLD, Hypothyroidism, adm for AMS w/ endocrinology consulted for question of Myxedema Coma    Severe Hypothyroidism   Pt on Levothyroxine 125mcg per surescripts recently filled but per patient has not been taking it for a long time; likely correlated given lithium level also undetectable. Will need later collateral for TFTs on stable dosing. Less likely 2/2 acute Lithium; possibly i/s/o  levothyroxine nonadherence. Overall patient w/ severe hypothyroidism possibly impending myxedema coma.   - TSH: 410. T3 total: <30. Free Thyroxine: <0.3   - Physical exam noted for ams though hard to delineate from known psychiatric baseline disease, Mild facial swelling and thyroid enlargement w/ areas of hyperpigmentation around neck and legs. No overt signs of adrenal insufficiency given afebrile/normotension/normocardia.     Recommendation:   - Stat: AM cortisol, TPO Ab.   - After lab draws; Give IV 100mg hydrocortisone stat; then after 1 hr give IV 200mcg levothyroxine and then IV 100mcg daily starting tomorrow 1/13/23 (weight based). On 1/15/23; check Free T4 for response  Update: Cortisol: 9.6 Indeterminate: can give hydrocortisone 50mg q8h x24; likely plan to wean quickly and repeat AM cortisol in a couple of days, if still unknown, may need stim test    T2DM  - On metformin 500 daily per surescripts  - Stat A1c   - Low ISS, FS qACHS    HLD   - On rosuvastatin 10mg per surescripts   - would check lipid panel outpatient once thyroid levels have stabilized 2/2 associated dyslipidemia 52 F Hx T2DM on metformin, bipolar (on lithium, domiciled w/ mother), HLD, Hypothyroidism, adm for AMS w/ endocrinology consulted for question of Myxedema Coma    Severe Hypothyroidism   Pt on Levothyroxine 125mcg per surescripts recently filled but per patient has not been taking it for a long time; likely correlated given lithium level also undetectable. Will need later collateral for TFTs on stable dosing. Less likely 2/2 acute Lithium; possibly i/s/o  levothyroxine nonadherence. Overall patient w/ severe hypothyroidism possibly impending myxedema coma.   - TSH: 410. T3 total: <30. Free Thyroxine: <0.3   - Physical exam noted for ams though hard to delineate from known psychiatric baseline disease, Mild facial swelling and thyroid enlargement w/ areas of hyperpigmentation around neck and legs. No overt signs of adrenal insufficiency given afebrile/normotension/normocardia.     Recommendation:   - Stat: AM cortisol, TPO Ab.   - After lab draws; Give IV 100mg hydrocortisone stat; then after 1 hr give IV 200mcg levothyroxine and then IV 100mcg daily starting tomorrow 1/13/23 (weight based). On 1/15/23; check Free T4 for response  Update: Cortisol: 9.6 Indeterminate: can give hydrocortisone (8 hr from 100mg dose) of 50mg q8h x24; likely plan to wean quickly and repeat AM cortisol in a couple of days, if still unknown, may need stim test    T2DM  - On metformin 500 daily per surescripts  - Stat A1c   - Low ISS, FS qACHS    HLD   - On rosuvastatin 10mg per surescripts   - would check lipid panel outpatient once thyroid levels have stabilized 2/2 associated dyslipidemia

## 2023-01-12 NOTE — H&P ADULT - ASSESSMENT
Ms. Alejandro is a  53 y/o F w/ PMH T2DM, bipolar (on lithium, domiciled w/ mother) who was brought in for abnormal behavior iso recent passing of mother found to have elevated TSH w/ low T3/fT4 likely d/t medication non-adherence Ms. Alejandro is a  51 y/o F w/ PMH T2DM, bipolar (on lithium, domiciled w/ mother) who was brought in for abnormal behavior iso recent passing of mother found to have TSH=410 w/ low T3/fT4 likely d/t medication non-adherence. c/f impending myxedema  coma

## 2023-01-13 LAB
ANION GAP SERPL CALC-SCNC: 12 MMOL/L — SIGNIFICANT CHANGE UP (ref 7–14)
BASOPHILS # BLD AUTO: 0.03 K/UL — SIGNIFICANT CHANGE UP (ref 0–0.2)
BASOPHILS NFR BLD AUTO: 0.3 % — SIGNIFICANT CHANGE UP (ref 0–2)
BUN SERPL-MCNC: 8 MG/DL — SIGNIFICANT CHANGE UP (ref 7–23)
CALCIUM SERPL-MCNC: 9.5 MG/DL — SIGNIFICANT CHANGE UP (ref 8.4–10.5)
CHLORIDE SERPL-SCNC: 110 MMOL/L — HIGH (ref 98–107)
CHLORIDE UR-SCNC: <20 MMOL/L — SIGNIFICANT CHANGE UP
CO2 SERPL-SCNC: 21 MMOL/L — LOW (ref 22–31)
CREAT ?TM UR-MCNC: 17 MG/DL — SIGNIFICANT CHANGE UP
CREAT SERPL-MCNC: 1.48 MG/DL — HIGH (ref 0.5–1.3)
EGFR: 42 ML/MIN/1.73M2 — LOW
EOSINOPHIL # BLD AUTO: 0.02 K/UL — SIGNIFICANT CHANGE UP (ref 0–0.5)
EOSINOPHIL NFR BLD AUTO: 0.2 % — SIGNIFICANT CHANGE UP (ref 0–6)
GLUCOSE BLDC GLUCOMTR-MCNC: 118 MG/DL — HIGH (ref 70–99)
GLUCOSE SERPL-MCNC: 94 MG/DL — SIGNIFICANT CHANGE UP (ref 70–99)
HCT VFR BLD CALC: 35.4 % — SIGNIFICANT CHANGE UP (ref 34.5–45)
HGB BLD-MCNC: 11.1 G/DL — LOW (ref 11.5–15.5)
IANC: 7.63 K/UL — HIGH (ref 1.8–7.4)
IMM GRANULOCYTES NFR BLD AUTO: 0.2 % — SIGNIFICANT CHANGE UP (ref 0–0.9)
LYMPHOCYTES # BLD AUTO: 1.79 K/UL — SIGNIFICANT CHANGE UP (ref 1–3.3)
LYMPHOCYTES # BLD AUTO: 17.8 % — SIGNIFICANT CHANGE UP (ref 13–44)
MAGNESIUM SERPL-MCNC: 2 MG/DL — SIGNIFICANT CHANGE UP (ref 1.6–2.6)
MCHC RBC-ENTMCNC: 27.7 PG — SIGNIFICANT CHANGE UP (ref 27–34)
MCHC RBC-ENTMCNC: 31.4 GM/DL — LOW (ref 32–36)
MCV RBC AUTO: 88.3 FL — SIGNIFICANT CHANGE UP (ref 80–100)
MONOCYTES # BLD AUTO: 0.55 K/UL — SIGNIFICANT CHANGE UP (ref 0–0.9)
MONOCYTES NFR BLD AUTO: 5.5 % — SIGNIFICANT CHANGE UP (ref 2–14)
NEUTROPHILS # BLD AUTO: 7.63 K/UL — HIGH (ref 1.8–7.4)
NEUTROPHILS NFR BLD AUTO: 76 % — SIGNIFICANT CHANGE UP (ref 43–77)
NRBC # BLD: 0 /100 WBCS — SIGNIFICANT CHANGE UP (ref 0–0)
NRBC # FLD: 0 K/UL — SIGNIFICANT CHANGE UP (ref 0–0)
OSMOLALITY UR: 61 MOSM/KG — SIGNIFICANT CHANGE UP (ref 50–1200)
PHOSPHATE SERPL-MCNC: 2.7 MG/DL — SIGNIFICANT CHANGE UP (ref 2.5–4.5)
PLATELET # BLD AUTO: 291 K/UL — SIGNIFICANT CHANGE UP (ref 150–400)
POTASSIUM SERPL-MCNC: 3.2 MMOL/L — LOW (ref 3.5–5.3)
POTASSIUM SERPL-SCNC: 3.2 MMOL/L — LOW (ref 3.5–5.3)
RBC # BLD: 4.01 M/UL — SIGNIFICANT CHANGE UP (ref 3.8–5.2)
RBC # FLD: 13.5 % — SIGNIFICANT CHANGE UP (ref 10.3–14.5)
SODIUM SERPL-SCNC: 143 MMOL/L — SIGNIFICANT CHANGE UP (ref 135–145)
SODIUM UR-SCNC: <20 MMOL/L — SIGNIFICANT CHANGE UP
THYROPEROXIDASE AB SERPL-ACNC: 19.3 IU/ML — SIGNIFICANT CHANGE UP
WBC # BLD: 10.04 K/UL — SIGNIFICANT CHANGE UP (ref 3.8–10.5)
WBC # FLD AUTO: 10.04 K/UL — SIGNIFICANT CHANGE UP (ref 3.8–10.5)

## 2023-01-13 PROCEDURE — 99231 SBSQ HOSP IP/OBS SF/LOW 25: CPT

## 2023-01-13 PROCEDURE — 99233 SBSQ HOSP IP/OBS HIGH 50: CPT | Mod: GC

## 2023-01-13 RX ORDER — ENOXAPARIN SODIUM 100 MG/ML
40 INJECTION SUBCUTANEOUS EVERY 24 HOURS
Refills: 0 | Status: DISCONTINUED | OUTPATIENT
Start: 2023-01-13 | End: 2023-01-13

## 2023-01-13 RX ORDER — POTASSIUM CHLORIDE 20 MEQ
40 PACKET (EA) ORAL EVERY 4 HOURS
Refills: 0 | Status: COMPLETED | OUTPATIENT
Start: 2023-01-13 | End: 2023-01-13

## 2023-01-13 RX ORDER — HEPARIN SODIUM 5000 [USP'U]/ML
5000 INJECTION INTRAVENOUS; SUBCUTANEOUS EVERY 8 HOURS
Refills: 0 | Status: DISCONTINUED | OUTPATIENT
Start: 2023-01-13 | End: 2023-02-09

## 2023-01-13 RX ORDER — HYDROCORTISONE 20 MG
25 TABLET ORAL
Refills: 0 | Status: COMPLETED | OUTPATIENT
Start: 2023-01-14 | End: 2023-01-15

## 2023-01-13 RX ORDER — RISPERIDONE 4 MG/1
0.5 TABLET ORAL
Refills: 0 | Status: DISCONTINUED | OUTPATIENT
Start: 2023-01-13 | End: 2023-01-18

## 2023-01-13 RX ORDER — HYDROCORTISONE 20 MG
25 TABLET ORAL ONCE
Refills: 0 | Status: COMPLETED | OUTPATIENT
Start: 2023-01-13 | End: 2023-01-13

## 2023-01-13 RX ADMIN — Medication 25 MILLIGRAM(S): at 21:51

## 2023-01-13 RX ADMIN — Medication 40 MILLIEQUIVALENT(S): at 11:57

## 2023-01-13 RX ADMIN — RISPERIDONE 0.5 MILLIGRAM(S): 4 TABLET ORAL at 21:50

## 2023-01-13 RX ADMIN — HEPARIN SODIUM 5000 UNIT(S): 5000 INJECTION INTRAVENOUS; SUBCUTANEOUS at 21:50

## 2023-01-13 RX ADMIN — Medication 40 MILLIEQUIVALENT(S): at 08:55

## 2023-01-13 RX ADMIN — Medication 50 MILLIGRAM(S): at 05:50

## 2023-01-13 RX ADMIN — Medication 50 MILLIGRAM(S): at 13:15

## 2023-01-13 RX ADMIN — Medication 100 MICROGRAM(S): at 06:30

## 2023-01-13 NOTE — PROGRESS NOTE ADULT - SUBJECTIVE AND OBJECTIVE BOX
Saul Tim, PGY1    WHITE, MELO  52y  Female    Complaints:  Subjective:           FAMILY HISTORY:  No pertinent family history in first degree relatives      52yVital Signs Last 24 Hrs  T(C): 36.7 (13 Jan 2023 05:55), Max: 37.1 (12 Jan 2023 13:32)  T(F): 98.1 (13 Jan 2023 05:55), Max: 98.7 (12 Jan 2023 13:32)  HR: 75 (13 Jan 2023 05:55) (67 - 87)  BP: 158/85 (13 Jan 2023 05:55) (144/90 - 158/85)  BP(mean): --  RR: 18 (13 Jan 2023 05:55) (17 - 18)  SpO2: 99% (13 Jan 2023 05:55) (99% - 100%)    Parameters below as of 13 Jan 2023 05:55  Patient On (Oxygen Delivery Method): room air          PHYSICAL EXAM:  GENERAL: NAD, well-groomed, well-developed  HEAD:  Atraumatic, Normocephalic  EYES: EOMI, PERRLA, conjunctiva and sclera clear  ENMT: No tonsillar erythema, exudates, or enlargement; Moist mucous membranes, Good dentition, No lesions  NECK: Supple, No JVD, Normal thyroid  NERVOUS SYSTEM:  Alert & Oriented X3, Good concentration; Motor Strength 5/5 B/L upper and lower extremities; DTRs 2+ intact and symmetric  CHEST/LUNG: Clear to percussion bilaterally; No rales, rhonchi, wheezing, or rubs  HEART: Regular rate and rhythm; No murmurs, rubs, or gallops  ABDOMEN: Soft, Nontender, Nondistended; Bowel sounds present  EXTREMITIES:  2+ Peripheral Pulses, No clubbing, cyanosis, or edema  LYMPH: No lymphadenopathy noted  SKIN: No rashes or lesions      Consultant(s) Notes Reviewed:  [x ] YES  [ ] NO  Care Discussed with Consultants/Other Providers [ x] YES  [ ] NO    LABS:                Female  RADIOLOGY & ADDITIONAL TESTS:    Imaging Personally Reviewed:  [ ] YES  [ ] NO    MedsMEDICATIONS  (STANDING):  dextrose 5%. 1000 milliLiter(s) (50 mL/Hr) IV Continuous <Continuous>  dextrose 5%. 1000 milliLiter(s) (100 mL/Hr) IV Continuous <Continuous>  dextrose 50% Injectable 25 Gram(s) IV Push once  dextrose 50% Injectable 12.5 Gram(s) IV Push once  dextrose 50% Injectable 25 Gram(s) IV Push once  glucagon  Injectable 1 milliGRAM(s) IntraMuscular once  hydrocortisone sodium succinate Injectable 50 milliGRAM(s) IV Push every 8 hours  insulin lispro (ADMELOG) corrective regimen sliding scale   SubCutaneous three times a day before meals  insulin lispro (ADMELOG) corrective regimen sliding scale   SubCutaneous at bedtime  levothyroxine Injectable 100 MICROGram(s) IV Push <User Schedule>    MEDICATIONS  (PRN):  acetaminophen     Tablet .. 650 milliGRAM(s) Oral every 6 hours PRN Temp greater or equal to 38C (100.4F), Mild Pain (1 - 3)  dextrose Oral Gel 15 Gram(s) Oral once PRN Blood Glucose LESS THAN 70 milliGRAM(s)/deciliter  haloperidol    Injectable 2 milliGRAM(s) IV Push every 6 hours PRN Agitation  LORazepam   Injectable 2 milliGRAM(s) IntraMuscular every 6 hours PRN Agitation      HEALTH ISSUES - PROBLEM Dx:  Hypothyroid    Bipolar disorder, current episode mixed, moderate    GRACY (acute kidney injury)    T2DM (type 2 diabetes mellitus)    Prophylactic measure    Hyperlipidemia    Severe hypothyroidism                   Saul Tim, PGY1    WHITE, MELO  52y  Female    Complaints:  Subjective:     No acute o/n events. Received 2mg prn haldol at 630pm yesterday 11/12. Pt more conversive but continues to be disoriented and does not have insight into situation.       FAMILY HISTORY:  No pertinent family history in first degree relatives      52yVital Signs Last 24 Hrs  T(C): 36.7 (13 Jan 2023 05:55), Max: 37.1 (12 Jan 2023 13:32)  T(F): 98.1 (13 Jan 2023 05:55), Max: 98.7 (12 Jan 2023 13:32)  HR: 75 (13 Jan 2023 05:55) (67 - 87)  BP: 158/85 (13 Jan 2023 05:55) (144/90 - 158/85)  BP(mean): --  RR: 18 (13 Jan 2023 05:55) (17 - 18)  SpO2: 99% (13 Jan 2023 05:55) (99% - 100%)    Parameters below as of 13 Jan 2023 05:55  Patient On (Oxygen Delivery Method): room air          PHYSICAL EXAM:  GENERAL: NAD, well-groomed, well-developed  HEAD:  Atraumatic, Normocephalic  EYES: EOMI, PERRLA, conjunctiva and sclera clear  ENMT: No tonsillar erythema, exudates, or enlargement; Moist mucous membranes, Good dentition, No lesions  NECK: Supple, No JVD, Normal thyroid  NERVOUS SYSTEM:  Alert & Oriented X1, Poor concentration; Motor Strength 5/5 B/L upper and lower extremities;   CHEST/LUNG: Clear to percussion bilaterally; No rales, rhonchi, wheezing, or rubs  HEART: Regular rate and rhythm; No murmurs, rubs, or gallops  ABDOMEN: Soft, Nontender, Nondistended; Bowel sounds present  EXTREMITIES: 2+ Peripheral Pulses, No clubbing, cyanosis, or edema  LYMPH: No lymphadenopathy noted  SKIN: No rashes or lesions      Consultant(s) Notes Reviewed:  [x ] YES  [ ] NO  Care Discussed with Consultants/Other Providers [ x] YES  [ ] NO    LABS:                          11.1   10.04 )-----------( 291      ( 13 Jan 2023 06:00 )             35.4       01-13    143  |  110<H>  |  8   ----------------------------<  94  3.2<L>   |  21<L>  |  1.48<H>    Ca    9.5      13 Jan 2023 06:00  Phos  2.7     01-13  Mg     2.00     01-13    TPro  7.2  /  Alb  4.1  /  TBili  <0.2  /  DBili  x   /  AST  25  /  ALT  14  /  AlkPhos  123<H>  01-12            CAPILLARY BLOOD GLUCOSE      POCT Blood Glucose.: 108 mg/dL (13 Jan 2023 11:47)        Female  RADIOLOGY & ADDITIONAL TESTS:    Imaging Personally Reviewed:  [ ] YES  [ ] NO    MedsMEDICATIONS  (STANDING):  dextrose 5%. 1000 milliLiter(s) (50 mL/Hr) IV Continuous <Continuous>  dextrose 5%. 1000 milliLiter(s) (100 mL/Hr) IV Continuous <Continuous>  dextrose 50% Injectable 25 Gram(s) IV Push once  dextrose 50% Injectable 12.5 Gram(s) IV Push once  dextrose 50% Injectable 25 Gram(s) IV Push once  glucagon  Injectable 1 milliGRAM(s) IntraMuscular once  hydrocortisone sodium succinate Injectable 50 milliGRAM(s) IV Push every 8 hours  insulin lispro (ADMELOG) corrective regimen sliding scale   SubCutaneous three times a day before meals  insulin lispro (ADMELOG) corrective regimen sliding scale   SubCutaneous at bedtime  levothyroxine Injectable 100 MICROGram(s) IV Push <User Schedule>    MEDICATIONS  (PRN):  acetaminophen     Tablet .. 650 milliGRAM(s) Oral every 6 hours PRN Temp greater or equal to 38C (100.4F), Mild Pain (1 - 3)  dextrose Oral Gel 15 Gram(s) Oral once PRN Blood Glucose LESS THAN 70 milliGRAM(s)/deciliter  haloperidol    Injectable 2 milliGRAM(s) IV Push every 6 hours PRN Agitation  LORazepam   Injectable 2 milliGRAM(s) IntraMuscular every 6 hours PRN Agitation      HEALTH ISSUES - PROBLEM Dx:  Hypothyroid    Bipolar disorder, current episode mixed, moderate    GRACY (acute kidney injury)    T2DM (type 2 diabetes mellitus)    Prophylactic measure    Hyperlipidemia    Severe hypothyroidism

## 2023-01-13 NOTE — PROGRESS NOTE ADULT - ATTENDING COMMENTS
53 yo Fh/o DM, bipolar, ? non compliant with lithium brought in by family for AMS. Pt's mother who is her primary caregiver passed at home within the past few days and pt reportedly unable to care for herself , found to have TSH of 410 and low T3/T4 likely from medication non adherence     #Hypothyroidism- Likely from non adherence to her Synthroid. Unclear if this is also contributing to her AMS. Endocrine was consulted. Rec Give IV 100mg hydrocortisone stat; then after 1 hr give IV 200mcg levothyroxine and then IV 100mcg daily starting tomorrow 1/13/23    #Bipolar d/o- Pt not compliant with [place]. Psych consulted. Rec Haldol 2mg q6hrs prn. Likely will need inpt psych     #DM- A1C 5.5. Glucose nml on BMP. May not need insulin, Monitor FSG for now     #GRACY-  Suspect may be pre-renal in setting of poor PO intake. Baseline Cr unknown.  Encourage PO hydration

## 2023-01-13 NOTE — PATIENT PROFILE ADULT - FALL HARM RISK - UNIVERSAL INTERVENTIONS
Bed in lowest position, wheels locked, appropriate side rails in place/Call bell, personal items and telephone in reach/Instruct patient to call for assistance before getting out of bed or chair/Non-slip footwear when patient is out of bed/[place] to call system/Physically safe environment - no spills, clutter or unnecessary equipment/Purposeful Proactive Rounding/Room/bathroom lighting operational, light cord in reach

## 2023-01-13 NOTE — PROGRESS NOTE ADULT - ASSESSMENT
Ms. Alejandro is a  53 y/o F w/ PMH T2DM, bipolar (on lithium, domiciled w/ mother) who was brought in for abnormal behavior iso recent passing of mother found to have TSH=410 w/ low T3/fT4 likely d/t medication non-adherence. c/f impending myxedema  coma

## 2023-01-13 NOTE — PROGRESS NOTE ADULT - ASSESSMENT
52 F Hx T2DM on metformin, bipolar (on lithium, domiciled w/ mother), HLD, Hypothyroidism, adm for AMS w/ endocrinology consulted for question of Myxedema Coma    Severe Hypothyroidism   Pt on Levothyroxine 125mcg per surescripts recently filled but per patient has not been taking it for a long time; likely correlated given lithium level also undetectable. Will need later collateral for TFTs on stable dosing. Less likely 2/2 acute Lithium; possibly i/s/o  levothyroxine nonadherence. Overall patient w/ severe hypothyroidism possibly impending myxedema coma. Now more active.   - TSH: 410. T3 total: <30. Free Thyroxine: <0.3 TPO: 19.6. Cortisol: 9.6  - Physical exam noted for ams though hard to delineate from known psychiatric baseline disease, Mild facial swelling and thyroid enlargement w/ areas of hyperpigmentation around neck and legs. No overt signs of adrenal insufficiency given afebrile/normotension/normocardia.     Recommendation:   - C/w Levothyroxine IV 100mcg QD. Check Free T4 on 1/15  - Low suspicion for Adrenal Insufficiency; will speed up taper. 1/13 tonight: Change hydrocortisone 50mg to 25mg. 1/14: hydrocortisone 25mg q12. 1/15: hydrocortisone 25mg in AM. 1/16: Check AM Cortisol     T2DM  - On metformin 500 daily per surescripts  - Stat A1c   - Low ISS, FS qACHS    HLD   - On rosuvastatin 10mg per surescripts   - would check lipid panel outpatient once thyroid levels have stabilized 2/2 associated dyslipidemia    D/w Primary Team   John Paul Mcintyre PGY3

## 2023-01-13 NOTE — PROGRESS NOTE ADULT - PROBLEM SELECTOR PLAN 3
Pt w/ SCr 1.4 on admission. Unclear baseline. Possibly pre-renal. Pt may have low PO after mother passed d/t inability to care for her ADL's independently.   - Will send urine studies  - Trend BMP

## 2023-01-13 NOTE — PROGRESS NOTE ADULT - ATTENDING COMMENTS
52F with severe hypothyroidism, impending myxedema coma but VS not grossly abnormal, however patient somewhat lethargic.  TFTs with severely high TSH and undetectable T4/T3.  Initiated IV levothyroxine. Cortisol indeterminate started IV hydrocortisone will do quick taper and recheck HPA axis.  High risk patient high level decision making.    Alvaro Jonas MD  Division of Endocrinology  Pager: [phone]    If after 6PM or before 9AM, or on weekends/holidays, please call endocrine answering service for assistance ([phone]).  For nonurgent matters email [email or web address] for assistance.

## 2023-01-13 NOTE — PROGRESS NOTE ADULT - PROBLEM SELECTOR PLAN 2
Patient with bipolar disease on lithium  - Low lithium levels on labs. Likely non-adherent to medications  - Psych consulted. f/u recs for mood stabilization  - Psych recs appreciated: Haldol 5mg /Ativan 2mg po/im q6hrs prn  - Will likely need inpatient psych admission/treatment as pt unable to care for ADL's. Patient with bipolar disease on lithium. Per family patient usually oriented to person, place, time. But w/o full insight into situation at baseline. Able to recognize family when visited.   - Low lithium levels on labs. Likely non-adherent to medications  - Psych consulted. f/u recs for mood stabilization  - Psych recs appreciated: Haldol 2mg IV prn q6h. Transfer to [facility] when bed available.  - Will likely need inpatient psych admission/treatment as pt unable to care for ADL's.

## 2023-01-13 NOTE — PROGRESS NOTE ADULT - SUBJECTIVE AND OBJECTIVE BOX
Dr. John Paul Mcintyre PGY3    MELO GRIGGS  52y  MRN: [id number]    Subjective:    Patient is a 52y old  Female who presents with a chief complaint of behavioral change; abnormal TFT (13 Jan 2023 07:27)    Spoke with patient at bedside; received 100mg hydrocortisone x1 and 50q8h. levothyroxine 200x1. Patient speaking louder and  more clearly; AOx3 but still believes parents are alive. Per nursing, also agitated; otherwise talking about how she cannot have apple melia and unable to quantify when she stopped taking her medications.     MEDICATIONS  (STANDING):  dextrose 5%. 1000 milliLiter(s) (50 mL/Hr) IV Continuous <Continuous>  dextrose 5%. 1000 milliLiter(s) (100 mL/Hr) IV Continuous <Continuous>  dextrose 50% Injectable 25 Gram(s) IV Push once  dextrose 50% Injectable 12.5 Gram(s) IV Push once  dextrose 50% Injectable 25 Gram(s) IV Push once  glucagon  Injectable 1 milliGRAM(s) IntraMuscular once  hydrocortisone sodium succinate Injectable 50 milliGRAM(s) IV Push every 8 hours  insulin lispro (ADMELOG) corrective regimen sliding scale   SubCutaneous three times a day before meals  insulin lispro (ADMELOG) corrective regimen sliding scale   SubCutaneous at bedtime  levothyroxine Injectable 100 MICROGram(s) IV Push <User Schedule>    MEDICATIONS  (PRN):  acetaminophen     Tablet .. 650 milliGRAM(s) Oral every 6 hours PRN Temp greater or equal to 38C (100.4F), Mild Pain (1 - 3)  dextrose Oral Gel 15 Gram(s) Oral once PRN Blood Glucose LESS THAN 70 milliGRAM(s)/deciliter  haloperidol    Injectable 2 milliGRAM(s) IV Push every 6 hours PRN Agitation  LORazepam   Injectable 2 milliGRAM(s) IntraMuscular every 6 hours PRN Agitation        Objective:    Vitals: Vital Signs Last 24 Hrs  T(C): 36.7 (01-13-23 @ 05:55), Max: 37.1 (01-12-23 @ 13:32)  T(F): 98.1 (01-13-23 @ 05:55), Max: 98.7 (01-12-23 @ 13:32)  HR: 75 (01-13-23 @ 05:55) (67 - 81)  BP: 158/85 (01-13-23 @ 05:55) (150/95 - 158/85)  BP(mean): --  RR: 18 (01-13-23 @ 05:55) (17 - 18)  SpO2: 99% (01-13-23 @ 05:55) (99% - 100%)            I&O's Summary      PHYSICAL EXAM:  GENERAL: NAD, well-groomed, well-developed  HEAD:  Atraumatic, Normocephalic; mild facial edema, slight periorbital edema   EYES: EOMI, PERRLA, conjunctiva and sclera clear  ENMT: Noted dry, shiny neck skin w/ hyperpigmentation, noted palpable thyroid. No tongue edema but some lip smacking.   NECK: Supple, No JVD, Normal thyroid  CHEST/LUNG: Clear to auscultation bilaterally; No rales, rhonchi, wheezing, or rubs  HEART: Regular rate and rhythm; No murmurs, rubs, or gallops  ABDOMEN: Soft, Nontender, Nondistended; Bowel sounds present  EXTREMITIES:  2+ Peripheral Pulses. Slight edema b/l. B/L dry shiny, and hyperpigmented skin   NERVOUS SYSTEM:  Alert & Oriented 3;, poor concentration;  PSYCH: Blunted affect. muffled speech. Answers questions now inappropriately; tangential     LABS:                        11.1   10.04 )-----------( 291      ( 13 Jan 2023 06:00 )             35.4                         11.9   7.43  )-----------( 283      ( 12 Jan 2023 05:30 )             37.1     Hgb Trend: 11.1<--, 11.9<--  01-13    143  |  110<H>  |  8   ----------------------------<  94  3.2<L>   |  21<L>  |  1.48<H>  01-12    146<H>  |  113<H>  |  10  ----------------------------<  98  3.6   |  21<L>  |  1.41<H>    Ca    9.5      13 Jan 2023 06:00  Ca    9.8      12 Jan 2023 05:30  Phos  2.7     01-13  Mg     2.00     01-13    TPro  7.2  /  Alb  4.1  /  TBili  <0.2  /  DBili  x   /  AST  25  /  ALT  14  /  AlkPhos  123<H>  01-12    Creatinine Trend: 1.48<--, 1.41<--                    CAPILLARY BLOOD GLUCOSE      POCT Blood Glucose.: 108 mg/dL (13 Jan 2023 11:47)  POCT Blood Glucose.: 118 mg/dL (13 Jan 2023 08:13)  POCT Blood Glucose.: 221 mg/dL (12 Jan 2023 23:22)  POCT Blood Glucose.: 110 mg/dL (12 Jan 2023 19:09)

## 2023-01-13 NOTE — PROGRESS NOTE ADULT - PROBLEM SELECTOR PLAN 4
On metformin at home. Unknown A1c  - Obtain A1c  - ISS for now On metformin at home. Unknown A1c  - A1c=5.5  - ISS for now

## 2023-01-14 LAB
ANION GAP SERPL CALC-SCNC: 12 MMOL/L — SIGNIFICANT CHANGE UP (ref 7–14)
APPEARANCE UR: CLEAR — SIGNIFICANT CHANGE UP
BILIRUB UR-MCNC: NEGATIVE — SIGNIFICANT CHANGE UP
BUN SERPL-MCNC: 15 MG/DL — SIGNIFICANT CHANGE UP (ref 7–23)
CALCIUM SERPL-MCNC: 9.9 MG/DL — SIGNIFICANT CHANGE UP (ref 8.4–10.5)
CHLORIDE SERPL-SCNC: 111 MMOL/L — HIGH (ref 98–107)
CO2 SERPL-SCNC: 22 MMOL/L — SIGNIFICANT CHANGE UP (ref 22–31)
COLOR SPEC: COLORLESS — SIGNIFICANT CHANGE UP
CREAT SERPL-MCNC: 1.7 MG/DL — HIGH (ref 0.5–1.3)
DIFF PNL FLD: NEGATIVE — SIGNIFICANT CHANGE UP
EGFR: 36 ML/MIN/1.73M2 — LOW
GLUCOSE SERPL-MCNC: 97 MG/DL — SIGNIFICANT CHANGE UP (ref 70–99)
GLUCOSE UR QL: NEGATIVE — SIGNIFICANT CHANGE UP
HCT VFR BLD CALC: 36.4 % — SIGNIFICANT CHANGE UP (ref 34.5–45)
HGB BLD-MCNC: 11.2 G/DL — LOW (ref 11.5–15.5)
KETONES UR-MCNC: NEGATIVE — SIGNIFICANT CHANGE UP
LEUKOCYTE ESTERASE UR-ACNC: NEGATIVE — SIGNIFICANT CHANGE UP
MAGNESIUM SERPL-MCNC: 1.9 MG/DL — SIGNIFICANT CHANGE UP (ref 1.6–2.6)
MCHC RBC-ENTMCNC: 27.5 PG — SIGNIFICANT CHANGE UP (ref 27–34)
MCHC RBC-ENTMCNC: 30.8 GM/DL — LOW (ref 32–36)
MCV RBC AUTO: 89.2 FL — SIGNIFICANT CHANGE UP (ref 80–100)
NITRITE UR-MCNC: NEGATIVE — SIGNIFICANT CHANGE UP
NRBC # BLD: 0 /100 WBCS — SIGNIFICANT CHANGE UP (ref 0–0)
NRBC # FLD: 0 K/UL — SIGNIFICANT CHANGE UP (ref 0–0)
OSMOLALITY UR: 81 MOSM/KG — SIGNIFICANT CHANGE UP (ref 50–1200)
PH UR: 6.5 — SIGNIFICANT CHANGE UP (ref 5–8)
PHOSPHATE SERPL-MCNC: 3.1 MG/DL — SIGNIFICANT CHANGE UP (ref 2.5–4.5)
PLATELET # BLD AUTO: 302 K/UL — SIGNIFICANT CHANGE UP (ref 150–400)
POTASSIUM SERPL-MCNC: 3.5 MMOL/L — SIGNIFICANT CHANGE UP (ref 3.5–5.3)
POTASSIUM SERPL-SCNC: 3.5 MMOL/L — SIGNIFICANT CHANGE UP (ref 3.5–5.3)
POTASSIUM UR-SCNC: 6.1 MMOL/L — SIGNIFICANT CHANGE UP
PROT UR-MCNC: ABNORMAL
RBC # BLD: 4.08 M/UL — SIGNIFICANT CHANGE UP (ref 3.8–5.2)
RBC # FLD: 13.7 % — SIGNIFICANT CHANGE UP (ref 10.3–14.5)
SODIUM SERPL-SCNC: 145 MMOL/L — SIGNIFICANT CHANGE UP (ref 135–145)
SODIUM UR-SCNC: 23 MMOL/L — SIGNIFICANT CHANGE UP
SP GR SPEC: 1 — LOW (ref 1.01–1.05)
UROBILINOGEN FLD QL: SIGNIFICANT CHANGE UP
WBC # BLD: 12.51 K/UL — HIGH (ref 3.8–10.5)
WBC # FLD AUTO: 12.51 K/UL — HIGH (ref 3.8–10.5)

## 2023-01-14 PROCEDURE — 99232 SBSQ HOSP IP/OBS MODERATE 35: CPT | Mod: GC

## 2023-01-14 RX ORDER — LITHIUM CARBONATE 300 MG/1
1 TABLET, EXTENDED RELEASE ORAL
Qty: 0 | Refills: 0 | DISCHARGE

## 2023-01-14 RX ORDER — POTASSIUM CHLORIDE 20 MEQ
20 PACKET (EA) ORAL ONCE
Refills: 0 | Status: COMPLETED | OUTPATIENT
Start: 2023-01-14 | End: 2023-01-14

## 2023-01-14 RX ADMIN — HEPARIN SODIUM 5000 UNIT(S): 5000 INJECTION INTRAVENOUS; SUBCUTANEOUS at 22:24

## 2023-01-14 RX ADMIN — HEPARIN SODIUM 5000 UNIT(S): 5000 INJECTION INTRAVENOUS; SUBCUTANEOUS at 06:11

## 2023-01-14 RX ADMIN — Medication 25 MILLIGRAM(S): at 17:33

## 2023-01-14 RX ADMIN — Medication 100 MICROGRAM(S): at 06:10

## 2023-01-14 RX ADMIN — HEPARIN SODIUM 5000 UNIT(S): 5000 INJECTION INTRAVENOUS; SUBCUTANEOUS at 14:26

## 2023-01-14 RX ADMIN — Medication 20 MILLIEQUIVALENT(S): at 10:13

## 2023-01-14 RX ADMIN — RISPERIDONE 0.5 MILLIGRAM(S): 4 TABLET ORAL at 20:54

## 2023-01-14 NOTE — DISCHARGE NOTE PROVIDER - NSDCCPCAREPLAN_GEN_ALL_CORE_FT
PRINCIPAL DISCHARGE DIAGNOSIS  Diagnosis: Hypothyroid  Assessment and Plan of Treatment: You came in and were found to have very low thyroid hormone. You have a history of low thyroid function and were taking supplementation at home. Your thyroid levels were very low here and there was concern that the low thyroid level was contributing to worsening of your confusion and mental status. You were given steroids and thyroid hormones during your hospital stay to help stabilize your thyroid hormone levels. You responded well to the treatment and had some increased energy levels. Endocrinologists were following you during the hospital stay and recommended that you continue to take your thyroid medication. You need to take dose _______ every morning.      SECONDARY DISCHARGE DIAGNOSES  Diagnosis: Bipolar disorder, current episode mixed, moderate  Assessment and Plan of Treatment: You have bipolar disorder for which you were takign lithium at home. Your lithium levels were low on presentation. This may have contributed to your confusion. You were swithced to a different medication called Risperedone 0.5mg to be taken every evening. Psych recs _______________    Diagnosis: GRACY (acute kidney injury)  Assessment and Plan of Treatment: You came in with mild kidney dysfunction. It is unclear if you have baseline chronic kidney disease or if you had an acute injury to your kidney. We monitored your kidney function and checked urine studies. We saw ____________     PRINCIPAL DISCHARGE DIAGNOSIS  Diagnosis: Hypothyroid  Assessment and Plan of Treatment: You came in and were found to have very low thyroid hormone. You have a history of low thyroid function and were taking supplementation at home. Your thyroid levels were very low here and there was concern that the low thyroid level was contributing to worsening of your confusion and mental status. You were given steroids and thyroid hormones during your hospital stay to help stabilize your thyroid hormone levels. You responded well to the treatment and had some increased energy levels. Endocrinologists were following you during the hospital stay and recommended that you continue to take your thyroid medication. Please continue to take your thyroid medication every day as prescribed.      SECONDARY DISCHARGE DIAGNOSES  Diagnosis: Bipolar disorder, current episode mixed, moderate  Assessment and Plan of Treatment: You have bipolar disorder for which you were taking lithium at home. Your lithium levels were low on presentation. This may have contributed to your confusion. You were switched to a different medication called Risperidone to be taken twice a day. You were also started on a medication called Cogentin to be taken twice a day which helps reduce side effects from Risperidone. Please continue to take both Risperidone and Cogentin every day as prescribed.    Diagnosis: GRACY (acute kidney injury)  Assessment and Plan of Treatment: You came in with mild kidney dysfunction. It is unclear if you have baseline chronic kidney disease or if you had an acute injury to your kidney. During your hospital stay, your kidney function was periodically checked and remained largely stable.     PRINCIPAL DISCHARGE DIAGNOSIS  Diagnosis: Hypothyroid  Assessment and Plan of Treatment: You came in and were found to have very low thyroid hormone. You have a history of low thyroid function and were taking supplementation at home. Your thyroid levels were very low here and there was concern that the low thyroid level was contributing to worsening of your confusion and mental status. You were given steroids and thyroid hormones during your hospital stay to help stabilize your thyroid hormone levels. You responded well to the treatment and had some increased energy levels. Endocrinologists were following you during the hospital stay and recommended that you continue to take your thyroid medication. Please continue to take your thyroid medication every day as prescribed. Follow up with endocrinology the week of 3/13 to repeat your thyroid function tests.      SECONDARY DISCHARGE DIAGNOSES  Diagnosis: Bipolar disorder, current episode mixed, moderate  Assessment and Plan of Treatment: You have bipolar disorder for which you were taking lithium at home. Your lithium levels were low on presentation. This may have contributed to your confusion. You were switched to a different medication called Risperidone to be taken twice a day. You were also started on a medication called Cogentin to be taken twice a day which helps reduce side effects from Risperidone. Please continue to take both Risperidone and Cogentin every day as prescribed.    Diagnosis: GRACY (acute kidney injury)  Assessment and Plan of Treatment: You came in with mild kidney dysfunction. It is unclear if you have baseline chronic kidney disease or if you had an acute injury to your kidney. During your hospital stay, your kidney function was periodically checked and remained largely stable.

## 2023-01-14 NOTE — DISCHARGE NOTE PROVIDER - HOSPITAL COURSE
Ms. Alejandro is a 51 y/o F w/ PMH T2DM, bipolar (on lithium, domiciled w/ mother) who was BIBEMS for abnormal behavior reported by cousin. [employer] from Pt's cousin Armaan. [employer] called cousin because they tried to drop off food at house but pt/mom not answering door. Armaan called pt about this who said pt's mom is "resting." Armaan then went in to check on house on , found pt's mom dead (likely  for 3 days) with pt bringing her blankets and pillows. Pt presented to the ED w/ 's-170's / 90's; HR 80's. Afebrile. On RA. Pt found to have elevated TSH to 410 w/ low T3 and fT4. Pt was evaluated by psych and recommended for inpatient psych treatment due to inability to care for ADL's; however, admitted to medicine first for abnormal TFT's. Pt was evaluated by endocrine d/t c/f impending myxedema coma since pt's mental status worse than reported baseline. Pt was started on solu-cortef 100mg IVP x1dose followed by IVP 200ug synthroid 1 hour later. Pt was continued on IV synthroid 100ug qd. Pt never had vital sign abnormalities and had some improvement in energy and mental status. Solu-cortef taper was placed to end 1/15/23. Endocrine eventually recommended continuing levothyroxin ___________. However, patient continued to have lack of insight into situation. Pt was seen by psych during hospital course and was recommended to start 0.5mg risperadone qhs w/ PRN haldol 2mg IVP. Pt unable to take care of her ADL's at baseline and psych eventually recommended continuing ?meds // ?inpatient psych Ms. Alejandro is a 53 y/o F w/ PMH T2DM, bipolar (on lithium, domiciled w/ mother) who was BIBEMS for abnormal behavior reported by cousin. [employer] from Pt's cousin Armaan. [employer] called cousin because they tried to drop off food at house but pt/mom not answering door. Armaan called pt about this who said pt's mom is "resting." Armaan then went in to check on house on , found pt's mom dead (likely  for 3 days) with pt bringing her blankets and pillows. Pt presented to the ED w/ 's-170's / 90's; HR 80's. Afebrile. On RA. Pt found to have elevated TSH to 410 w/ low T3 and fT4. Pt was evaluated by psych and recommended for inpatient psych treatment due to inability to care for ADL's; however, admitted to medicine first for abnormal TFT's. Pt was evaluated by endocrine d/t c/f impending myxedema coma since pt's mental status worse than reported baseline. Pt was started on solu-cortef 100mg IVP x1dose followed by IVP 200ug synthroid 1 hour later. Pt was continued on IV synthroid 100ug qd. Pt never had vital sign abnormalities and had some improvement in energy and mental status. Solu-cortef taper was placed to end 1/15/23. Endocrine eventually recommended continuing levothyroxin 125 mcg with follow up in _____ weeks for repeat TSH and. free T4 measurement However, patient continued to have lack of insight into situation. Pt was seen by psych during hospital course and was recommended to start 0.5mg risperadone in the morning and 1 mg at night w/ PRN haldol 2mg IVP as needed for agitation. Pt unable to take care of her ADL's at baseline and psych eventually recommended placement.    Ms. Alejandro was medically optimized for discharge to _____. She will need follow up with her endocrinologist in ____ weeks for repeat thyroid function testing and lipid panel to determine need for statin initiation. Ms. Jyothi Alejandro is a 52 year old female with PMH of T2DM, bipolar disorder (on lithium, domiciled w/ mother), BIBEMS for abnormal behavior reported by her cousin. Cousin went to check on pt and her mother on  after being contacted by [employer], who reported that no one was answering the door at pt's home. At the house, pt's cousin found pt's mother dead (likely  for 3 days), however pt exhibited no insight into her mother's death, stating that she was "resting," and continuing to bring her blankets and pillows.    On admission, pt was found to have elevated TSH to 410 & low T3, T4 due to medication non-adherence. Pt admitted to medicine and treated with Solu-cortef taper & IV synthroid, followed by transition to PO Synthroid. TFTs were repeated on 2/15 ***. Pt evaluated by psychiatry who recommended initiation of risperidone; pt appeared to be non-adherent to her previously prescribed lithium. Risperidone dose was titrated to 1 mg bid ***. On this regimen, pt did not require any additional PRN medication for anxiety or agitation. Pt was also started on Cogentin 0.5 mg bid to minimize extrapyramidal symptoms. Despite psychiatric optimization, pt persistently lacked insight into her medical & social situation, including not understanding or accepting her medical diagnoses and not understanding that her mother had . Pt was determined to lack decision-making capacity and was assigned guardianship ***.    Course c/b COVID. Pt tested positive on 2/3 and was mildly symptomatic with cough & nasal congestion. However, pt remained afebrile & non-hypoxic and was not treated with antiviral medication. Course c/b mild GRACY on CKD, SCr on admission 1.41 and peaked at 1.77; subsequently trended back down to baseline,. remaining stable around 1.3-1.5.    Ms. Alejandro was medically optimized for discharge to _____. She will need follow up with her endocrinologist in ____ weeks for repeat thyroid function testing and lipid panel to determine need for statin initiation. Ms. Jyothi Alejandro is a 52 year old female with PMH of T2DM, bipolar disorder (on lithium, domiciled w/ mother), BIBEMS for abnormal behavior reported by her cousin. Cousin went to check on pt and her mother on  after being contacted by [employer], who reported that no one was answering the door at pt's home. At the house, pt's cousin found pt's mother dead (likely  for 3 days), however pt exhibited no insight into her mother's death, stating that she was "resting," and continuing to bring her blankets and pillows.    On admission, pt was found to have elevated TSH to 410 & low T3, T4 due to medication non-adherence. Pt admitted to medicine and treated with Solu-cortef taper & IV synthroid, followed by transition to PO Synthroid. TFTs were repeated on ***. Pt evaluated by psychiatry who recommended initiation of risperidone; pt appeared to be non-adherent to her previously prescribed lithium. Risperidone dose was titrated to 1 mg bid ***. On this regimen, pt did not require any additional PRN medication for anxiety or agitation. Pt was also started on Cogentin 0.5 mg bid to minimize extrapyramidal symptoms. Despite psychiatric optimization, pt persistently lacked insight into her medical & social situation, including not understanding or accepting her medical diagnoses and not understanding that her mother had . Pt was determined to lack decision-making capacity and was assigned guardianship ***.    Course c/b COVID. Pt tested positive on 2/3 and was mildly symptomatic with cough & nasal congestion. However, pt remained afebrile & non-hypoxic and was not treated with antiviral medication. Course c/b mild GRACY on CKD, SCr on admission 1.41 and peaked at 1.77; subsequently trended back down to baseline,. remaining stable around 1.3-1.5.    Ms. Alejandro was medically optimized for discharge to _____. She will need follow up with her endocrinologist in ____ weeks for repeat thyroid function testing and lipid panel to determine need for statin initiation.    Follow ups: PCP, endocrinology, psychiatry    Medication changes:  	START taking:  		- risperidone 1 mg twice a day***  		- Cogentin 0.5 mg twice a day  		- synthroid 125 mcg once daily***  	STOP taking:  		- lithium Ms. Jyothi Alejandro is a 52 year old female with PMH of T2DM, bipolar disorder (on lithium, domiciled w/ mother), BIBEMS for abnormal behavior reported by her cousin. Cousin went to check on pt and her mother on  after being contacted by [employer], who reported that no one was answering the door at pt's home. At the house, pt's cousin found pt's mother dead (likely  for 3 days), however pt exhibited no insight into her mother's death, stating that she was "resting," and continuing to bring her blankets and pillows.    On admission, pt was found to have elevated TSH to 410 & low T3, T4 due to medication non-adherence. Pt admitted to medicine and treated with Solu-cortef taper & IV synthroid, followed by transition to PO Synthroid. Repeat TSH 6.49 and fT4 1.6 on , about 1 month from after restarting synthroid. TFTs should be repeated on 3/13. Sugars also well-controlled without ISS, A1C 5.5, and GFR low, so endocrinology recommending stopping metformin at discharge. Pt evaluated by psychiatry who recommended initiation of risperidone; pt appeared to be non-adherent to her previously prescribed lithium. Risperidone dose was titrated to 1 mg bid (7 AM and 8 PM). On this regimen, pt did not require any additional PRN medication for anxiety or agitation. Pt was also started on Cogentin 0.5 mg bid to minimize extrapyramidal symptoms. Despite psychiatric optimization, pt persistently lacked insight into her medical & social situation, including not understanding or accepting her medical diagnoses and not understanding that her mother had . Pt was determined to lack decision-making capacity and was assigned guardianship.    Course c/b COVID. Pt tested positive on 2/3 and was mildly symptomatic with cough & nasal congestion. However, pt remained afebrile & non-hypoxic and was not treated with antiviral medication. Course c/b mild GRACY on CKD, SCr on admission 1.41 and peaked at 1.77; subsequently trended back down to baseline,. remaining stable around 1.3-1.5.    Ms. Alejandro was medically optimized for discharge to a SNF. She will need follow up with her endocrinologist in 1 week (around 3/13) for repeat thyroid function testing and lipid panel to determine need for statin initiation.    Follow ups: PCP, endocrinology, psychiatry    Medication changes:  	START taking:  		- risperidone 1 mg twice a day***  		- Cogentin 0.5 mg twice a day  		- synthroid 125 mcg once daily***  	STOP taking:  		- lithium                                - metformin Ms. Jyothi Alejandro is a 52 year old female with PMH of T2DM, bipolar disorder (on lithium, domiciled w/ mother), BIBEMS for abnormal behavior reported by her cousin. Cousin went to check on pt and her mother on  after being contacted by [employer], who reported that no one was answering the door at pt's home. At the house, pt's cousin found pt's mother dead (likely  for 3 days), however pt exhibited no insight into her mother's death, stating that she was "resting," and continuing to bring her blankets and pillows.    On admission, pt was found to have elevated TSH to 410 & low T3, T4 due to medication non-adherence. Pt admitted to medicine and treated with Solu-cortef taper & IV synthroid, followed by transition to PO Synthroid. Repeat TSH 6.49 and fT4 1.6 on , about 1 month from after restarting synthroid. TFTs should be repeated on the week of 3/13. Sugars also well-controlled without ISS, A1C 5.5, and GFR low, so endocrinology recommending stopping metformin at discharge. Pt evaluated by psychiatry who recommended initiation of risperidone; pt appeared to be non-adherent to her previously prescribed lithium. Risperidone dose was titrated to 1 mg bid (7 AM and 8 PM). On this regimen, pt did not require any additional PRN medication for anxiety or agitation. Pt was also started on Cogentin 0.5 mg bid to minimize extrapyramidal symptoms. Despite psychiatric optimization, pt persistently lacked insight into her medical & social situation, including not understanding or accepting her medical diagnoses and not understanding that her mother had . Pt was determined to lack decision-making capacity and was assigned guardianship.    Course c/b COVID. Pt tested positive on 2/3 and was mildly symptomatic with cough & nasal congestion. However, pt remained afebrile & non-hypoxic and was not treated with antiviral medication. Course c/b mild GRACY on CKD, SCr on admission 1.41 and peaked at 1.77; subsequently trended back down to baseline,. remaining stable around 1.3-1.5.    Ms. Alejandro was medically optimized for discharge to a SNF. She will need follow up with her endocrinologist in 1 week (around 3/13) for repeat thyroid function testing and lipid panel to determine need for statin initiation.    Follow ups: PCP, endocrinology, psychiatry    Medication changes:  	START taking:  		- risperidone 1 mg twice a day***  		- Cogentin 0.5 mg twice a day  		- synthroid 125 mcg once daily***  	STOP taking:  		- lithium                                - metformin

## 2023-01-14 NOTE — DISCHARGE NOTE PROVIDER - NSDCFUADDAPPT_GEN_ALL_CORE_FT
Please follow up with your PCP within 1-2 weeks   Please follow up with psych within 1-2 weeks Please follow up with your PCP within 1-2 weeks   Please follow up with psych within 1-2 weeks  Please follow up with endocrinology within ____ weeks Please follow up with your PCP within 1-2 weeks. We will provide the information of our medicine clinic if you would like to follow up with them.   Please follow up with psych within 1-2 weeks  Please follow up with endocrinology within 1 week (around 3/13) Please follow up with your PCP within 1-2 weeks. We will provide the information of our medicine clinic if you would like to follow up with them.   Please follow up with psych within 1-2 weeks  Please follow up with endocrinology within 1 week (week of 3/13) Please follow up with your PCP within 1-2 weeks. We will provide the information of our medicine clinic if you would like to follow up with them.   Please follow up with psych within 1-2 weeks  Please follow up with endocrinology within 1 week (week of 3/13) to recheck thyroid function tests.

## 2023-01-14 NOTE — PROGRESS NOTE ADULT - PROBLEM SELECTOR PLAN 1
Pt with hypothyroidism reported to be on synthroid 125ug qd at home. Likely non-adherent to medication. Presentation c/f impending myxedema coma.  - On presentation TSH=410; T3<30; fT4<0.3.   - AMS/lethargy concerning for neurologic manifestation for impending myxedema coma. Endocrine consulted  - Endocrine recs appreciated: Stat TPO, cortisol level. After labs 100mg IV solu-cortef. 1 hour later 200mg IV synthroid. 100mg qd starting tmrw. Obtain free T4 level sunday  - Avoid beta blockers. Pt with hypothyroidism reported to be on synthroid 125ug qd at home. Likely non-adherent to medication. Presentation c/f impending myxedema coma.  - On presentation TSH=410; T3<30; fT4<0.3.   - AMS/lethargy concerning for neurologic manifestation for impending myxedema coma. Endocrine consulted  - Endocrine recs appreciated: Stat TPO, cortisol level. After labs IV solu-cortef taper. 100mg IV synthroid qd, f/u T4 level 1/15; AM cortisol 1/16  - Pt appears more conversive and w/ more energy. Vitals stable.   - Avoid beta blockers.

## 2023-01-14 NOTE — DISCHARGE NOTE PROVIDER - REASON FOR ADMISSION
"Spoke to Arabella (Mom) --    Started a few weeks ago, noticing a purple-pink spit up/throw up  Mom concerned about the color primarily, has not eaten anything this color  Spit up this color specifically has been seen ~3-4 times in the past couple weeks,  saw this color yesterday and was concerned  Mom notes she found some research articles that suggest lansoprazole could be causing this color?    No blood in stools  Did have a hard stool yesterday  Have done probiotic in her bottle in the past; not doing this consistently    Reflux seems worse than when last talked to Dr Panda   Arabella notes that it is possible that the congestion is worsening the reflux  \"Still super congested and has cough\"  \"Inconsolable last night for 2 hours\"    Continues on Neocate 27kcal, getting her to drink more than 4-5 oz at a time \"is not possible\", usually goes about 3 hrs between feeds  - Intermittently giving baby food which she seems to really enjoy per Mom; Mom wonders if the introduction of baby food could be making stools harder?  - Reports good wet diapers    Recommended continue nasal suction to help with congestion, follow up with PCP if ongoing concerns related to this. Recommended trial prune juice + warm baths for harder stools. Recommend keeping Nely hydrated to help thin nasal secretions/congestion and help soften stools.     Recommended schedule follow up appt with Dr Panda if no improvements in reflux episodes in the next couple weeks as congestion hopefully improves. Discussed concerning colors in spit up to monitor for: coffee ground emesis, bright red blood. Requested Mom notify GI team if any of these colors are observed    Arabella verbalized understanding, reports comfortable with plan, does want a call back if Dr Panda has any other thoughts as to why the spit up is purple-pink colored  Sakina Mena, JUNIORN, RN    "
behavioral change; abnormal TFT

## 2023-01-14 NOTE — PROGRESS NOTE ADULT - PROBLEM SELECTOR PLAN 5
- DVT: Lovenox 40mg qd  - Diet: DASH/CC - DVT: HSQ 5000 q8h  - Diet: DASH/CC  - Dispo: likely inpatient psych

## 2023-01-14 NOTE — DISCHARGE NOTE PROVIDER - NSDCMRMEDTOKEN_GEN_ALL_CORE_FT
benztropine 1 mg oral tablet: 1 tab(s) orally 2 times a day  levothyroxine 125 mcg (0.125 mg) oral tablet: 1 tab(s) orally once a day  metFORMIN 500 mg oral tablet: 1 tab(s) orally 2 times a day   benztropine 0.5 mg oral tablet: 1 tab(s) orally 2 times a day  levothyroxine 125 mcg (0.125 mg) oral tablet: 1 tab(s) orally once a day  polyethylene glycol 3350 oral powder for reconstitution: 17 gram(s) orally once a day  RisperDAL 1 mg oral tablet: 1 tab(s) orally 2 times a day (at 7 AM and 8 PM)  senna leaf extract oral tablet: 2 tab(s) orally once a day (at bedtime)

## 2023-01-14 NOTE — DISCHARGE NOTE PROVIDER - NSFOLLOWUPCLINICSTOKEN_GEN_ALL_ED_FT
782609:1 week|| ||00\01||False; 015480:1 week|| ||00\01||False;404663:1 week|| ||00\01||False;887796:2 weeks|| ||00\01||False;

## 2023-01-14 NOTE — DISCHARGE NOTE PROVIDER - NSFOLLOWUPCLINICS_GEN_ALL_ED_FT
[facility] Psychiatry  Psychiatry  [address] [place] [address]  Phone: [phone]  Fax:   Follow Up Time: 1 week     [facility] Psychiatry  Psychiatry  [address] [place] [address]  Phone: [phone]  Fax:   Follow Up Time: 1 week    [facility] Endocrinology  Endocrinology  [address] [place] [address]  Phone: [phone]  Fax:   Follow Up Time: 1 week    [facility] [place]  Internal Medicine  [address] [place] [address]  Phone: [phone]  Fax: [phone]  Follow Up Time: 2 weeks

## 2023-01-14 NOTE — PROGRESS NOTE ADULT - ATTENDING COMMENTS
Ms. Alejandro is a  53 y/o F w/ PMH T2DM, bipolar (on lithium, domiciled w/ mother) who was brought in for abnormal behavior iso recent passing of mother found to have TSH=410 w/ low T3/fT4 likely d/t medication non-adherence. c/f impending myxedema  coma  f/w endo recs, f/w psych recs   F/w labs , correct electrolytes PRN

## 2023-01-14 NOTE — PROGRESS NOTE ADULT - SUBJECTIVE AND OBJECTIVE BOX
Saul Tim, PGY1    WHITE, MELO  52y  Female    Complaints:  Subjective:           FAMILY HISTORY:  No pertinent family history in first degree relatives      52yVital Signs Last 24 Hrs  T(C): 36.5 (14 Jan 2023 04:51), Max: 37.2 (13 Jan 2023 14:11)  T(F): 97.7 (14 Jan 2023 04:51), Max: 98.9 (13 Jan 2023 14:11)  HR: 65 (14 Jan 2023 04:51) (65 - 87)  BP: 139/82 (14 Jan 2023 04:51) (136/76 - 165/89)  BP(mean): --  RR: 18 (14 Jan 2023 04:51) (18 - 18)  SpO2: 100% (14 Jan 2023 04:51) (96% - 100%)    Parameters below as of 14 Jan 2023 04:51  Patient On (Oxygen Delivery Method): room air          PHYSICAL EXAM:  GENERAL: NAD, well-groomed, well-developed  HEAD:  Atraumatic, Normocephalic  EYES: EOMI, PERRLA, conjunctiva and sclera clear  ENMT: No tonsillar erythema, exudates, or enlargement; Moist mucous membranes, Good dentition, No lesions  NECK: Supple, No JVD, Normal thyroid  NERVOUS SYSTEM:  Alert & Oriented X3, Good concentration; Motor Strength 5/5 B/L upper and lower extremities; DTRs 2+ intact and symmetric  CHEST/LUNG: Clear to percussion bilaterally; No rales, rhonchi, wheezing, or rubs  HEART: Regular rate and rhythm; No murmurs, rubs, or gallops  ABDOMEN: Soft, Nontender, Nondistended; Bowel sounds present  EXTREMITIES:  2+ Peripheral Pulses, No clubbing, cyanosis, or edema  LYMPH: No lymphadenopathy noted  SKIN: No rashes or lesions      Consultant(s) Notes Reviewed:  [x ] YES  [ ] NO  Care Discussed with Consultants/Other Providers [ x] YES  [ ] NO    LABS:                Female  RADIOLOGY & ADDITIONAL TESTS:    Imaging Personally Reviewed:  [ ] YES  [ ] NO    MedsMEDICATIONS  (STANDING):  dextrose 5%. 1000 milliLiter(s) (50 mL/Hr) IV Continuous <Continuous>  dextrose 5%. 1000 milliLiter(s) (100 mL/Hr) IV Continuous <Continuous>  dextrose 50% Injectable 25 Gram(s) IV Push once  dextrose 50% Injectable 12.5 Gram(s) IV Push once  dextrose 50% Injectable 25 Gram(s) IV Push once  glucagon  Injectable 1 milliGRAM(s) IntraMuscular once  heparin   Injectable 5000 Unit(s) SubCutaneous every 8 hours  hydrocortisone sodium succinate Injectable 25 milliGRAM(s) IV Push two times a day  insulin lispro (ADMELOG) corrective regimen sliding scale   SubCutaneous three times a day before meals  insulin lispro (ADMELOG) corrective regimen sliding scale   SubCutaneous at bedtime  levothyroxine Injectable 100 MICROGram(s) IV Push <User Schedule>  risperiDONE   Tablet 0.5 milliGRAM(s) Oral <User Schedule>    MEDICATIONS  (PRN):  acetaminophen     Tablet .. 650 milliGRAM(s) Oral every 6 hours PRN Temp greater or equal to 38C (100.4F), Mild Pain (1 - 3)  dextrose Oral Gel 15 Gram(s) Oral once PRN Blood Glucose LESS THAN 70 milliGRAM(s)/deciliter  haloperidol    Injectable 2 milliGRAM(s) IV Push every 6 hours PRN Agitation  LORazepam   Injectable 2 milliGRAM(s) IntraMuscular every 6 hours PRN Agitation      HEALTH ISSUES - PROBLEM Dx:  Hypothyroid    Bipolar disorder, current episode mixed, moderate    GRACY (acute kidney injury)    T2DM (type 2 diabetes mellitus)    Prophylactic measure    Hyperlipidemia    Severe hypothyroidism                   Saul Tim, PGY1    WHITE, MELO  52y  Female    Complaints:  Subjective:     No acute o/n events. Started risperedal qhs per psych recs. Did not require any prn haldol. Patient appears more conversive but c/t lack insight into situation. Today pt able to state her full name, age, . She states she is in the hospital but names it [facility]. States she is here b/c of her thyroid. States it is 2023. She states she lives with both her mom and dad. States they are at home and she get updates about them via [employer]. Pt otherwise reports feeling well. Denies subj fevers/chills, HA, dizziness, SoB, chest pain, palpitations, n/v, abd pain, diarrhea, dysuria.       FAMILY HISTORY:  No pertinent family history in first degree relatives      52yVital Signs Last 24 Hrs  T(C): 36.5 (2023 04:51), Max: 37.2 (2023 14:11)  T(F): 97.7 (2023 04:51), Max: 98.9 (2023 14:11)  HR: 65 (2023 04:51) (65 - 87)  BP: 139/82 (2023 04:51) (136/76 - 165/89)  BP(mean): --  RR: 18 (2023 04:51) (18 - 18)  SpO2: 100% (2023 04:51) (96% - 100%)    Parameters below as of 2023 04:51  Patient On (Oxygen Delivery Method): room air          PHYSICAL EXAM:  GENERAL: NAD, well-groomed, well-developed  HEAD:  Atraumatic, Normocephalic  EYES: EOMI, PERRLA, conjunctiva and sclera clear  ENMT: No tonsillar erythema, exudates, or enlargement; Moist mucous membranes,   NECK: Supple, No JVD, Normal thyroid  NERVOUS SYSTEM:  Alert & Oriented X2. Lacks insight to situation. Poor concentration; Motor Strength 5/5 B/L upper and lower extremities;   CHEST/LUNG: Clear to percussion bilaterally; No rales, rhonchi, wheezing, or rubs  HEART: Regular rate and rhythm; No murmurs, rubs, or gallops  ABDOMEN: Soft, Nontender, Nondistended; Bowel sounds present  EXTREMITIES:  2+ Peripheral Pulses, No clubbing, cyanosis, or edema  LYMPH: No lymphadenopathy noted  SKIN: No rashes or lesions      Consultant(s) Notes Reviewed:  [x ] YES  [ ] NO  Care Discussed with Consultants/Other Providers [ x] YES  [ ] NO    LABS:                          11.1   10.04 )-----------( 291      ( 2023 06:00 )             35.4       01-13    143  |  110<H>  |  8   ----------------------------<  94  3.2<L>   |  21<L>  |  1.48<H>    Ca    9.5      2023 06:00  Phos  2.7     01-13  Mg     2.00     01-13                              CAPILLARY BLOOD GLUCOSE      POCT Blood Glucose.: 127 mg/dL (2023 16:06)              Female  RADIOLOGY & ADDITIONAL TESTS:    Imaging Personally Reviewed:  [ ] YES  [ ] NO    MedsMEDICATIONS  (STANDING):  dextrose 5%. 1000 milliLiter(s) (50 mL/Hr) IV Continuous <Continuous>  dextrose 5%. 1000 milliLiter(s) (100 mL/Hr) IV Continuous <Continuous>  dextrose 50% Injectable 25 Gram(s) IV Push once  dextrose 50% Injectable 12.5 Gram(s) IV Push once  dextrose 50% Injectable 25 Gram(s) IV Push once  glucagon  Injectable 1 milliGRAM(s) IntraMuscular once  heparin   Injectable 5000 Unit(s) SubCutaneous every 8 hours  hydrocortisone sodium succinate Injectable 25 milliGRAM(s) IV Push two times a day  insulin lispro (ADMELOG) corrective regimen sliding scale   SubCutaneous three times a day before meals  insulin lispro (ADMELOG) corrective regimen sliding scale   SubCutaneous at bedtime  levothyroxine Injectable 100 MICROGram(s) IV Push <User Schedule>  risperiDONE   Tablet 0.5 milliGRAM(s) Oral <User Schedule>    MEDICATIONS  (PRN):  acetaminophen     Tablet .. 650 milliGRAM(s) Oral every 6 hours PRN Temp greater or equal to 38C (100.4F), Mild Pain (1 - 3)  dextrose Oral Gel 15 Gram(s) Oral once PRN Blood Glucose LESS THAN 70 milliGRAM(s)/deciliter  haloperidol    Injectable 2 milliGRAM(s) IV Push every 6 hours PRN Agitation  LORazepam   Injectable 2 milliGRAM(s) IntraMuscular every 6 hours PRN Agitation      HEALTH ISSUES - PROBLEM Dx:  Hypothyroid    Bipolar disorder, current episode mixed, moderate    GRACY (acute kidney injury)    T2DM (type 2 diabetes mellitus)    Prophylactic measure    Hyperlipidemia    Severe hypothyroidism                   Saul Tim, PGY1    WHITE, MELO  52y  Female    Complaints:  Subjective:     No acute o/n events. Started risperedal qhs per psych recs. Did not require any prn haldol. Patient appears more conversive but c/t lack insight into situation. Today pt able to state her full name, age, . She states she is in the hospital but names it [facility]. States she is here b/c of her thyroid. States it is 2023. She states she lives with both her mom and dad. States they are at home and she get updates about them via [employer]. Pt otherwise reports feeling well. Denies subj fevers/chills, HA, dizziness, SoB, chest pain, palpitations, n/v, abd pain, diarrhea, dysuria.       FAMILY HISTORY:  No pertinent family history in first degree relatives      52yVital Signs Last 24 Hrs  T(C): 36.5 (2023 04:51), Max: 37.2 (2023 14:11)  T(F): 97.7 (2023 04:51), Max: 98.9 (2023 14:11)  HR: 65 (2023 04:51) (65 - 87)  BP: 139/82 (2023 04:51) (136/76 - 165/89)  BP(mean): --  RR: 18 (2023 04:51) (18 - 18)  SpO2: 100% (2023 04:51) (96% - 100%)    Parameters below as of 2023 04:51  Patient On (Oxygen Delivery Method): room air          PHYSICAL EXAM:  GENERAL: NAD, well-groomed, well-developed  HEAD:  Atraumatic, Normocephalic  EYES: EOMI, PERRLA, conjunctiva and sclera clear  ENMT: No tonsillar erythema, exudates, or enlargement; Moist mucous membranes,   NECK: Supple, No JVD, Normal thyroid  NERVOUS SYSTEM:  Alert & Oriented X2. Lacks insight to situation. Poor concentration; Motor Strength 5/5 B/L upper and lower extremities;   CHEST/LUNG: Clear to percussion bilaterally; No rales, rhonchi, wheezing, or rubs  HEART: Regular rate and rhythm; No murmurs, rubs, or gallops  ABDOMEN: Soft, Nontender, Nondistended; Bowel sounds present  EXTREMITIES:  2+ Peripheral Pulses, No clubbing, cyanosis, or edema  LYMPH: No lymphadenopathy noted  SKIN: No rashes or lesions      Consultant(s) Notes Reviewed:  [x ] YES  [ ] NO  Care Discussed with Consultants/Other Providers [ x] YES  [ ] NO    LABS:                           11.2   12.51 )-----------( 302      ( 2023 06:00 )             36.4       01-14    145  |  111<H>  |  x   ----------------------------<  x   3.5   |  x   |  x     Ca    9.5      2023 06:00  Phos  3.1     -  Mg     1.90     -            CAPILLARY BLOOD GLUCOSE      POCT Blood Glucose.: 94 mg/dL (2023 08:10)        CAPILLARY BLOOD GLUCOSE      POCT Blood Glucose.: 127 mg/dL (2023 16:06)        Female  RADIOLOGY & ADDITIONAL TESTS:    Imaging Personally Reviewed:  [ ] YES  [ ] NO    MedsMEDICATIONS  (STANDING):  dextrose 5%. 1000 milliLiter(s) (50 mL/Hr) IV Continuous <Continuous>  dextrose 5%. 1000 milliLiter(s) (100 mL/Hr) IV Continuous <Continuous>  dextrose 50% Injectable 25 Gram(s) IV Push once  dextrose 50% Injectable 12.5 Gram(s) IV Push once  dextrose 50% Injectable 25 Gram(s) IV Push once  glucagon  Injectable 1 milliGRAM(s) IntraMuscular once  heparin   Injectable 5000 Unit(s) SubCutaneous every 8 hours  hydrocortisone sodium succinate Injectable 25 milliGRAM(s) IV Push two times a day  insulin lispro (ADMELOG) corrective regimen sliding scale   SubCutaneous three times a day before meals  insulin lispro (ADMELOG) corrective regimen sliding scale   SubCutaneous at bedtime  levothyroxine Injectable 100 MICROGram(s) IV Push <User Schedule>  risperiDONE   Tablet 0.5 milliGRAM(s) Oral <User Schedule>    MEDICATIONS  (PRN):  acetaminophen     Tablet .. 650 milliGRAM(s) Oral every 6 hours PRN Temp greater or equal to 38C (100.4F), Mild Pain (1 - 3)  dextrose Oral Gel 15 Gram(s) Oral once PRN Blood Glucose LESS THAN 70 milliGRAM(s)/deciliter  haloperidol    Injectable 2 milliGRAM(s) IV Push every 6 hours PRN Agitation  LORazepam   Injectable 2 milliGRAM(s) IntraMuscular every 6 hours PRN Agitation      HEALTH ISSUES - PROBLEM Dx:  Hypothyroid    Bipolar disorder, current episode mixed, moderate    GRACY (acute kidney injury)    T2DM (type 2 diabetes mellitus)    Prophylactic measure    Hyperlipidemia    Severe hypothyroidism

## 2023-01-14 NOTE — PROGRESS NOTE ADULT - PROBLEM SELECTOR PLAN 2
Patient with bipolar disease on lithium. Per family patient usually oriented to person, place, time. But w/o full insight into situation at baseline. Able to recognize family when visited.   - Low lithium levels on labs. Likely non-adherent to medications  - Psych consulted. f/u recs for mood stabilization  - Psych recs appreciated: Haldol 2mg IV prn q6h. Transfer to [facility] when bed available.  - Will likely need inpatient psych admission/treatment as pt unable to care for ADL's. Patient with bipolar disease on lithium. Per family patient usually oriented to person, place, time. But w/o full insight into situation at baseline. Able to recognize family when visited.   - Low lithium levels on labs. Likely non-adherent to medications  - Psych recs appreciated: Risperadone 0.5 PO qhs w/ Haldol 2mg IV prn q6h. Transfer to [facility] when bed available.  - Will likely need inpatient psych admission/treatment as pt unable to care for ADL's but f/u psych recs about dispo pending medical stabilization of thyroid and endo clearance

## 2023-01-15 LAB
ANION GAP SERPL CALC-SCNC: 13 MMOL/L — SIGNIFICANT CHANGE UP (ref 7–14)
BUN SERPL-MCNC: 18 MG/DL — SIGNIFICANT CHANGE UP (ref 7–23)
CALCIUM SERPL-MCNC: 9.5 MG/DL — SIGNIFICANT CHANGE UP (ref 8.4–10.5)
CHLORIDE SERPL-SCNC: 111 MMOL/L — HIGH (ref 98–107)
CO2 SERPL-SCNC: 24 MMOL/L — SIGNIFICANT CHANGE UP (ref 22–31)
CREAT SERPL-MCNC: 1.77 MG/DL — HIGH (ref 0.5–1.3)
EGFR: 34 ML/MIN/1.73M2 — LOW
GLUCOSE SERPL-MCNC: 78 MG/DL — SIGNIFICANT CHANGE UP (ref 70–99)
HCT VFR BLD CALC: 37.5 % — SIGNIFICANT CHANGE UP (ref 34.5–45)
HGB BLD-MCNC: 11.6 G/DL — SIGNIFICANT CHANGE UP (ref 11.5–15.5)
MAGNESIUM SERPL-MCNC: 1.9 MG/DL — SIGNIFICANT CHANGE UP (ref 1.6–2.6)
MCHC RBC-ENTMCNC: 27.7 PG — SIGNIFICANT CHANGE UP (ref 27–34)
MCHC RBC-ENTMCNC: 30.9 GM/DL — LOW (ref 32–36)
MCV RBC AUTO: 89.5 FL — SIGNIFICANT CHANGE UP (ref 80–100)
NRBC # BLD: 0 /100 WBCS — SIGNIFICANT CHANGE UP (ref 0–0)
NRBC # FLD: 0 K/UL — SIGNIFICANT CHANGE UP (ref 0–0)
PHOSPHATE SERPL-MCNC: 2.8 MG/DL — SIGNIFICANT CHANGE UP (ref 2.5–4.5)
PLATELET # BLD AUTO: 284 K/UL — SIGNIFICANT CHANGE UP (ref 150–400)
POTASSIUM SERPL-MCNC: 3.7 MMOL/L — SIGNIFICANT CHANGE UP (ref 3.5–5.3)
POTASSIUM SERPL-SCNC: 3.7 MMOL/L — SIGNIFICANT CHANGE UP (ref 3.5–5.3)
RBC # BLD: 4.19 M/UL — SIGNIFICANT CHANGE UP (ref 3.8–5.2)
RBC # FLD: 14 % — SIGNIFICANT CHANGE UP (ref 10.3–14.5)
SODIUM SERPL-SCNC: 148 MMOL/L — HIGH (ref 135–145)
T4 AB SER-ACNC: 3.74 UG/DL — LOW (ref 5.1–13)
WBC # BLD: 9.29 K/UL — SIGNIFICANT CHANGE UP (ref 3.8–10.5)
WBC # FLD AUTO: 9.29 K/UL — SIGNIFICANT CHANGE UP (ref 3.8–10.5)

## 2023-01-15 PROCEDURE — 99232 SBSQ HOSP IP/OBS MODERATE 35: CPT | Mod: GC

## 2023-01-15 RX ORDER — HYDROCORTISONE 20 MG
25 TABLET ORAL ONCE
Refills: 0 | Status: DISCONTINUED | OUTPATIENT
Start: 2023-01-15 | End: 2023-01-15

## 2023-01-15 RX ORDER — SODIUM CHLORIDE 9 MG/ML
500 INJECTION, SOLUTION INTRAVENOUS ONCE
Refills: 0 | Status: COMPLETED | OUTPATIENT
Start: 2023-01-15 | End: 2023-01-15

## 2023-01-15 RX ADMIN — Medication 2 MILLIGRAM(S): at 22:12

## 2023-01-15 RX ADMIN — Medication 25 MILLIGRAM(S): at 06:21

## 2023-01-15 RX ADMIN — HEPARIN SODIUM 5000 UNIT(S): 5000 INJECTION INTRAVENOUS; SUBCUTANEOUS at 14:19

## 2023-01-15 RX ADMIN — SODIUM CHLORIDE 500 MILLILITER(S): 9 INJECTION, SOLUTION INTRAVENOUS at 08:37

## 2023-01-15 RX ADMIN — HALOPERIDOL DECANOATE 2 MILLIGRAM(S): 100 INJECTION INTRAMUSCULAR at 21:56

## 2023-01-15 RX ADMIN — HEPARIN SODIUM 5000 UNIT(S): 5000 INJECTION INTRAVENOUS; SUBCUTANEOUS at 06:20

## 2023-01-15 RX ADMIN — RISPERIDONE 0.5 MILLIGRAM(S): 4 TABLET ORAL at 20:19

## 2023-01-15 RX ADMIN — HEPARIN SODIUM 5000 UNIT(S): 5000 INJECTION INTRAVENOUS; SUBCUTANEOUS at 21:56

## 2023-01-15 RX ADMIN — Medication 100 MICROGRAM(S): at 08:37

## 2023-01-15 NOTE — PROGRESS NOTE ADULT - SUBJECTIVE AND OBJECTIVE BOX
Internal Medicine   Oscar Kathleen | PGY-3    OVERNIGHT EVENTS: No acute overnight events. Will check cortisol AM in tomorrow after hydrocortisone taper completion today.    SUBJECTIVE: Patient was seen and examined at bedside this morning. Denies any nausea/vomiting/diarrhea, headache, shortness of breath, abdominal pain or chest pain/palpitations. Patient responding appropriately to questions and able to make needs known. Vital signs/imaging/telemetry events reviewed.       MEDICATIONS  (STANDING):  dextrose 5%. 1000 milliLiter(s) (50 mL/Hr) IV Continuous <Continuous>  dextrose 5%. 1000 milliLiter(s) (100 mL/Hr) IV Continuous <Continuous>  dextrose 50% Injectable 25 Gram(s) IV Push once  dextrose 50% Injectable 12.5 Gram(s) IV Push once  dextrose 50% Injectable 25 Gram(s) IV Push once  glucagon  Injectable 1 milliGRAM(s) IntraMuscular once  heparin   Injectable 5000 Unit(s) SubCutaneous every 8 hours  insulin lispro (ADMELOG) corrective regimen sliding scale   SubCutaneous three times a day before meals  insulin lispro (ADMELOG) corrective regimen sliding scale   SubCutaneous at bedtime  levothyroxine Injectable 100 MICROGram(s) IV Push <User Schedule>  risperiDONE   Tablet 0.5 milliGRAM(s) Oral <User Schedule>    MEDICATIONS  (PRN):  acetaminophen     Tablet .. 650 milliGRAM(s) Oral every 6 hours PRN Temp greater or equal to 38C (100.4F), Mild Pain (1 - 3)  dextrose Oral Gel 15 Gram(s) Oral once PRN Blood Glucose LESS THAN 70 milliGRAM(s)/deciliter  haloperidol    Injectable 2 milliGRAM(s) IV Push every 6 hours PRN Agitation  LORazepam   Injectable 2 milliGRAM(s) IntraMuscular every 6 hours PRN Agitation        T(F): 98.8 (01-15-23 @ 06:18), Max: 98.8 (01-15-23 @ 06:18)  HR: 77 (01-15-23 @ 06:18) (69 - 81)  BP: 136/81 (01-15-23 @ 06:18) (114/56 - 150/90)  BP(mean): --  RR: 18 (01-15-23 @ 06:18) (18 - 18)  SpO2: 99% (01-15-23 @ 06:18) (96% - 100%)    PHYSICAL EXAM:     GENERAL: NAD, lying in bed comfortably  HEAD:  Atraumatic, Normocephalic  EYES: EOMI, PERRLA, conjunctiva and sclera clear, no nystagmus noted  ENT: Moist mucous membranes,   NECK: Supple, No JVD, trachea midline  CHEST/LUNG: Clear to auscultation bilaterally; No rales, rhonchi, wheezing, or rubs. Unlabored respirations  HEART: Regular rate and rhythm; No murmurs, rubs, or gallops, normal S1/S2  ABDOMEN: normal bowel sounds; Soft, nontender, nondistended, no organomegaly   EXTREMITIES:  2+ Peripheral Pulses, brisk capillary refill. No clubbing, cyanosis, or edema  MSK: No gross deformities noted   Neurological:  A&Ox3, no focal deficits   SKIN: No rashes or lesions  PSYCH: Normal mood, affect     TELEMETRY:    LABS:                        11.6   9.29  )-----------( 284      ( 15 Maxim 2023 05:05 )             37.5     01-15    148<H>  |  111<H>  |  18  ----------------------------<  78  3.7   |  24  |  1.77<H>    Ca    9.5      15 Maxim 2023 05:05  Phos  2.8     01-15  Mg     1.90     01-15              Creatinine Trend: 1.77<--, 1.70<--, 1.48<--, 1.41<--  I&O's Summary    BNP    RADIOLOGY & ADDITIONAL STUDIES:             Internal Medicine   Oscar Hever | PGY-3    OVERNIGHT EVENTS: No acute overnight events. Will check cortisol AM in tomorrow after hydrocortisone taper completion today. T4 today 3.74.    SUBJECTIVE: Patient was seen and examined at bedside this morning. Denies any nausea/vomiting/diarrhea, headache, shortness of breath, abdominal pain or chest pain/palpitations. Patient responding appropriately to questions and able to make needs known. Vital signs/imaging/telemetry events reviewed.       MEDICATIONS  (STANDING):  dextrose 5%. 1000 milliLiter(s) (50 mL/Hr) IV Continuous <Continuous>  dextrose 5%. 1000 milliLiter(s) (100 mL/Hr) IV Continuous <Continuous>  dextrose 50% Injectable 25 Gram(s) IV Push once  dextrose 50% Injectable 12.5 Gram(s) IV Push once  dextrose 50% Injectable 25 Gram(s) IV Push once  glucagon  Injectable 1 milliGRAM(s) IntraMuscular once  heparin   Injectable 5000 Unit(s) SubCutaneous every 8 hours  insulin lispro (ADMELOG) corrective regimen sliding scale   SubCutaneous three times a day before meals  insulin lispro (ADMELOG) corrective regimen sliding scale   SubCutaneous at bedtime  levothyroxine Injectable 100 MICROGram(s) IV Push <User Schedule>  risperiDONE   Tablet 0.5 milliGRAM(s) Oral <User Schedule>    MEDICATIONS  (PRN):  acetaminophen     Tablet .. 650 milliGRAM(s) Oral every 6 hours PRN Temp greater or equal to 38C (100.4F), Mild Pain (1 - 3)  dextrose Oral Gel 15 Gram(s) Oral once PRN Blood Glucose LESS THAN 70 milliGRAM(s)/deciliter  haloperidol    Injectable 2 milliGRAM(s) IV Push every 6 hours PRN Agitation  LORazepam   Injectable 2 milliGRAM(s) IntraMuscular every 6 hours PRN Agitation        T(F): 98.8 (01-15-23 @ 06:18), Max: 98.8 (01-15-23 @ 06:18)  HR: 77 (01-15-23 @ 06:18) (69 - 81)  BP: 136/81 (01-15-23 @ 06:18) (114/56 - 150/90)  BP(mean): --  RR: 18 (01-15-23 @ 06:18) (18 - 18)  SpO2: 99% (01-15-23 @ 06:18) (96% - 100%)    PHYSICAL EXAM:     GENERAL: NAD, lying in bed comfortably  HEAD:  Atraumatic, Normocephalic  EYES: EOMI, PERRLA, conjunctiva and sclera clear, no nystagmus noted  ENT: Moist mucous membranes,   NECK: Supple, No JVD, trachea midline  CHEST/LUNG: Clear to auscultation bilaterally; No rales, rhonchi, wheezing, or rubs. Unlabored respirations  HEART: Regular rate and rhythm; No murmurs, rubs, or gallops, normal S1/S2  ABDOMEN: normal bowel sounds; Soft, nontender, nondistended, no organomegaly   EXTREMITIES:  2+ Peripheral Pulses, brisk capillary refill. No clubbing, cyanosis, or edema  MSK: No gross deformities noted   Neurological:  A&Ox3, no focal deficits   SKIN: No rashes or lesions  PSYCH: Normal mood, affect     TELEMETRY:    LABS:                        11.6   9.29  )-----------( 284      ( 15 Maxim 2023 05:05 )             37.5     01-15    148<H>  |  111<H>  |  18  ----------------------------<  78  3.7   |  24  |  1.77<H>    Ca    9.5      15 Maxim 2023 05:05  Phos  2.8     01-15  Mg     1.90     01-15              Creatinine Trend: 1.77<--, 1.70<--, 1.48<--, 1.41<--  I&O's Summary    BNP    RADIOLOGY & ADDITIONAL STUDIES:

## 2023-01-15 NOTE — PROGRESS NOTE ADULT - ATTENDING COMMENTS
Ms. Alejandro is a  53 y/o F w/ PMH T2DM, bipolar (on lithium, domiciled w/ mother) who was brought in for abnormal behavior iso recent passing of mother found to have TSH=410 w/ low T3/fT4 likely d/t medication non-adherence. c/f impending myxedema  coma  Patient is calm / cooperative   f/w endo recs   check am cortisol level   f/w psych recs

## 2023-01-15 NOTE — PROGRESS NOTE ADULT - SUBJECTIVE AND OBJECTIVE BOX
Chief Complaint: behavioral change; abnormal TFT    History: Patient sitting at the edge of the bed. Appears comfortable. States she is feeling well. Energy is good.    MEDICATIONS  (STANDING):  dextrose 5%. 1000 milliLiter(s) (50 mL/Hr) IV Continuous <Continuous>  dextrose 5%. 1000 milliLiter(s) (100 mL/Hr) IV Continuous <Continuous>  dextrose 50% Injectable 25 Gram(s) IV Push once  dextrose 50% Injectable 12.5 Gram(s) IV Push once  dextrose 50% Injectable 25 Gram(s) IV Push once  glucagon  Injectable 1 milliGRAM(s) IntraMuscular once  heparin   Injectable 5000 Unit(s) SubCutaneous every 8 hours  insulin lispro (ADMELOG) corrective regimen sliding scale   SubCutaneous three times a day before meals  insulin lispro (ADMELOG) corrective regimen sliding scale   SubCutaneous at bedtime  levothyroxine Injectable 100 MICROGram(s) IV Push <User Schedule>  risperiDONE   Tablet 0.5 milliGRAM(s) Oral <User Schedule>    MEDICATIONS  (PRN):  acetaminophen     Tablet .. 650 milliGRAM(s) Oral every 6 hours PRN Temp greater or equal to 38C (100.4F), Mild Pain (1 - 3)  dextrose Oral Gel 15 Gram(s) Oral once PRN Blood Glucose LESS THAN 70 milliGRAM(s)/deciliter  haloperidol    Injectable 2 milliGRAM(s) IV Push every 6 hours PRN Agitation  LORazepam   Injectable 2 milliGRAM(s) IntraMuscular every 6 hours PRN Agitation      PHYSICAL EXAM:  VITALS: T(C): 37.3 (01-15-23 @ 13:58)  T(F): 99.2 (01-15-23 @ 13:58), Max: 99.2 (01-15-23 @ 13:58)  HR: 60 (01-15-23 @ 13:58) (60 - 81)  BP: 152/86 (01-15-23 @ 13:58) (114/56 - 152/86)  RR:  (18 - 18)  SpO2:  (96% - 100%)  Wt(kg): --  General: Well-developed female, No acute distress, Speaking full sentences.   Eye:  Extraocular movements are intact, No proptosis or lid lag, No scleral icterus.   Respiratory:  Respirations are non-labored, Symmetric chest wall expansion.  Cardiovascular:  Normal rate, Regular rhythm, No edema.  Gastrointestinal:  Soft, Non-tender, Non-distended.   Integumentary:  Warm, dry.    POCT Blood Glucose.: 84 mg/dL (01-15-23 @ 17:07)  POCT Blood Glucose.: 85 mg/dL (01-15-23 @ 07:36)  POCT Blood Glucose.: 135 mg/dL (01-14-23 @ 21:59)  POCT Blood Glucose.: 93 mg/dL (01-14-23 @ 17:06)  POCT Blood Glucose.: 94 mg/dL (01-14-23 @ 12:05)  POCT Blood Glucose.: 94 mg/dL (01-14-23 @ 08:10)  POCT Blood Glucose.: 127 mg/dL (01-13-23 @ 16:06)  POCT Blood Glucose.: 108 mg/dL (01-13-23 @ 11:47)  POCT Blood Glucose.: 118 mg/dL (01-13-23 @ 08:13)  POCT Blood Glucose.: 221 mg/dL (01-12-23 @ 23:22)  POCT Blood Glucose.: 110 mg/dL (01-12-23 @ 19:09)      01-15    148<H>  |  111<H>  |  18  ----------------------------<  78  3.7   |  24  |  1.77<H>    eGFR: 34<L>    Ca    9.5      01-15  Mg     1.90     01-15  Phos  2.8     01-15            Thyroid Function Tests:  01-15 @ 05:05 .10 FreeT4 0.5 T3 -- Anti TPO -- Anti Thyroglobulin Ab -- TSI --  01-12 @ 14:10 TSH -- FreeT4 -- T3 -- Anti TPO 19.3 Anti Thyroglobulin Ab -- TSI --

## 2023-01-15 NOTE — PROGRESS NOTE ADULT - PROBLEM SELECTOR PLAN 1
Pt with hypothyroidism reported to be on synthroid 125ug qd at home. Likely non-adherent to medication. Presentation c/f impending myxedema coma.  - On presentation TSH=410; T3<30; fT4<0.3.   - AMS/lethargy concerning for neurologic manifestation for impending myxedema coma. Endocrine consulted  - Endocrine recs appreciated: Stat TPO, cortisol level. After labs IV solu-cortef taper. 100mg IV synthroid qd, f/u T4 level 1/15; AM cortisol 1/16  - Pt appears more conversive and w/ more energy. Vitals stable.   - Avoid beta blockers. Pt with hypothyroidism reported to be on synthroid 125ug qd at home. Likely non-adherent to medication. Presentation c/f impending myxedema coma.  - On presentation TSH=410; T3<30; fT4<0.3.   - AMS/lethargy concerning for neurologic manifestation for impending myxedema coma. Endocrine consulted  - Endocrine recs appreciated: Stat TPO, cortisol level. After labs IV solu-cortef taper. 100mg IV synthroid qd, f/u T4 level 1/15; AM cortisol 1/16  - T4 3.74 (1/15)  - Pt appears more conversive and w/ more energy. Vitals stable.   - Avoid beta blockers.

## 2023-01-15 NOTE — PROGRESS NOTE ADULT - ASSESSMENT
Ms. Alejandro is a  51 y/o F w/ PMH T2DM, bipolar (on lithium, domiciled w/ mother) who was brought in for abnormal behavior iso recent passing of mother found to have TSH=410 w/ low T3/fT4 likely d/t medication non-adherence. c/f impending myxedema  coma

## 2023-01-15 NOTE — PROGRESS NOTE ADULT - ASSESSMENT
52 F Hx T2DM on metformin, bipolar (on lithium, domiciled w/ mother), HLD, Hypothyroidism, adm for AMS w/ endocrinology consulted for question of Myxedema Coma    Severe Hypothyroidism   Pt on Levothyroxine 125mcg per surescripts recently filled but per patient has not been taking it for a long time; likely correlated given lithium level also undetectable. Will need later collateral for TFTs on stable dosing. Less likely 2/2 acute Lithium; possibly i/s/o  levothyroxine nonadherence. Overall patient w/ severe hypothyroidism possibly impending myxedema coma. Now more active.   - TSH: 410. T3 total: <30. Free Thyroxine: <0.3 TPO: 19.3 (normal). Cortisol: 9.6  - Repeat TSH on 1/15 370 and FT4 0.5  - Physical exam noted for ams though hard to delineate from known psychiatric baseline disease, Mild facial swelling and thyroid enlargement w/ areas of hyperpigmentation around neck and legs. No overt signs of adrenal insufficiency given afebrile/normotension/normocardia.     Recommendation:   - C/w Levothyroxine IV 100mcg QDay. Check FREE T4 on 1/18 (not TOTAL T4)  - Low suspicion for Adrenal Insufficiency; will speed up taper. 1/13: Change hydrocortisone 50mg to 25mg. 1/14: hydrocortisone 25mg q12. 1/15: hydrocortisone 25mg in AM. 1/16: Check AM Cortisol and ACTH    T2DM  - On metformin 500 daily per surescripts  - A1c 5.5  - Low ISS, FS qACHS    HLD   - On rosuvastatin 10mg per surescripts   - would check lipid panel outpatient once thyroid levels have stabilized 2/2 associated dyslipidemia    Discussed with primary team resident.    rPince Ivan DO, Endocrinology Fellow  For follow-up questions, discharge recommendations, or new consults please call answering service at [phone] (weekdays), [phone] (nights/weekends). For nonurgent matters, please email [email or web address] or [email or web address].

## 2023-01-15 NOTE — PROGRESS NOTE ADULT - PROBLEM SELECTOR PLAN 2
Patient with bipolar disease on lithium. Per family patient usually oriented to person, place, time. But w/o full insight into situation at baseline. Able to recognize family when visited.   - Low lithium levels on labs. Likely non-adherent to medications  - Psych recs appreciated: Risperadone 0.5 PO qhs w/ Haldol 2mg IV prn q6h. Transfer to [facility] when bed available.  - Will likely need inpatient psych admission/treatment as pt unable to care for ADL's but f/u psych recs about dispo pending medical stabilization of thyroid and endo clearance

## 2023-01-15 NOTE — RAPID RESPONSE TEAM SUMMARY - NSSITUATIONBACKGROUNDRRT_GEN_ALL_CORE
Called to JERRI ORTIZ, primary ACP team present who dismiss services of MAR. Please see primary team note for detail.

## 2023-01-16 DIAGNOSIS — G93.40 ENCEPHALOPATHY, UNSPECIFIED: ICD-10-CM

## 2023-01-16 DIAGNOSIS — E03.9 HYPOTHYROIDISM, UNSPECIFIED: ICD-10-CM

## 2023-01-16 DIAGNOSIS — Z29.9 ENCOUNTER FOR PROPHYLACTIC MEASURES, UNSPECIFIED: ICD-10-CM

## 2023-01-16 LAB
ACTH SER-ACNC: 4.3 PG/ML — LOW (ref 7.2–63.3)
ALBUMIN SERPL ELPH-MCNC: 3.9 G/DL — SIGNIFICANT CHANGE UP (ref 3.3–5)
ALP SERPL-CCNC: 101 U/L — SIGNIFICANT CHANGE UP (ref 40–120)
ALT FLD-CCNC: 22 U/L — SIGNIFICANT CHANGE UP (ref 4–33)
ANION GAP SERPL CALC-SCNC: 13 MMOL/L — SIGNIFICANT CHANGE UP (ref 7–14)
AST SERPL-CCNC: 27 U/L — SIGNIFICANT CHANGE UP (ref 4–32)
BILIRUB SERPL-MCNC: <0.2 MG/DL — SIGNIFICANT CHANGE UP (ref 0.2–1.2)
BUN SERPL-MCNC: 16 MG/DL — SIGNIFICANT CHANGE UP (ref 7–23)
CALCIUM SERPL-MCNC: 9.1 MG/DL — SIGNIFICANT CHANGE UP (ref 8.4–10.5)
CHLORIDE SERPL-SCNC: 109 MMOL/L — HIGH (ref 98–107)
CO2 SERPL-SCNC: 21 MMOL/L — LOW (ref 22–31)
CORTIS AM PEAK SERPL-MCNC: 13.1 UG/DL — SIGNIFICANT CHANGE UP (ref 6–18.4)
CREAT SERPL-MCNC: 1.67 MG/DL — HIGH (ref 0.5–1.3)
EGFR: 37 ML/MIN/1.73M2 — LOW
GLUCOSE SERPL-MCNC: 147 MG/DL — HIGH (ref 70–99)
MAGNESIUM SERPL-MCNC: 1.8 MG/DL — SIGNIFICANT CHANGE UP (ref 1.6–2.6)
PHOSPHATE SERPL-MCNC: 2.5 MG/DL — SIGNIFICANT CHANGE UP (ref 2.5–4.5)
POTASSIUM SERPL-MCNC: 3.7 MMOL/L — SIGNIFICANT CHANGE UP (ref 3.5–5.3)
POTASSIUM SERPL-SCNC: 3.7 MMOL/L — SIGNIFICANT CHANGE UP (ref 3.5–5.3)
PROT SERPL-MCNC: 6.6 G/DL — SIGNIFICANT CHANGE UP (ref 6–8.3)
SODIUM SERPL-SCNC: 143 MMOL/L — SIGNIFICANT CHANGE UP (ref 135–145)

## 2023-01-16 PROCEDURE — 99233 SBSQ HOSP IP/OBS HIGH 50: CPT | Mod: GC

## 2023-01-16 RX ORDER — LEVOTHYROXINE SODIUM 125 MCG
125 TABLET ORAL DAILY
Refills: 0 | Status: DISCONTINUED | OUTPATIENT
Start: 2023-01-17 | End: 2023-03-10

## 2023-01-16 RX ORDER — COSYNTROPIN 0.25 MG/ML
0.25 INJECTION, SOLUTION INTRAVENOUS ONCE
Refills: 0 | Status: COMPLETED | OUTPATIENT
Start: 2023-01-17 | End: 2023-01-17

## 2023-01-16 RX ADMIN — HEPARIN SODIUM 5000 UNIT(S): 5000 INJECTION INTRAVENOUS; SUBCUTANEOUS at 07:21

## 2023-01-16 RX ADMIN — RISPERIDONE 0.5 MILLIGRAM(S): 4 TABLET ORAL at 21:36

## 2023-01-16 RX ADMIN — Medication 100 MICROGRAM(S): at 07:21

## 2023-01-16 RX ADMIN — Medication 2 MILLIGRAM(S): at 16:55

## 2023-01-16 RX ADMIN — HEPARIN SODIUM 5000 UNIT(S): 5000 INJECTION INTRAVENOUS; SUBCUTANEOUS at 13:13

## 2023-01-16 RX ADMIN — HEPARIN SODIUM 5000 UNIT(S): 5000 INJECTION INTRAVENOUS; SUBCUTANEOUS at 21:36

## 2023-01-16 NOTE — PROGRESS NOTE ADULT - ASSESSMENT
52F with bipolar disorder, hypothyroidism, admitted for acute encephalopathy most likely in the setting of psychosis due to lithium nonadherence with course complicated by acute renal failure.

## 2023-01-16 NOTE — PROGRESS NOTE ADULT - ATTENDING COMMENTS
52F with bipolar disorder, hypothyroidism, admitted for acute encephalopathy most likely in the setting of psychosis due to lithium nonadherence with course complicated by acute renal failure.  Patient also with myxedema , on IV synthroid , now suspecting adrenal insufficiency, pending cosyntropin test .  F/w endocrinology recs.  PT- no skill needs .  Patient can't do ADL/IADL independently will need placement.  F/w psych recs.   DVT px

## 2023-01-16 NOTE — PHYSICAL THERAPY INITIAL EVALUATION ADULT - PERTINENT HX OF CURRENT PROBLEM, REHAB EVAL
Pt is a 52 year old female with bipolar disorder, hypothyroidism, admitted for acute encephalopathy most likely in the setting of psychosis due to lithium nonadherence with course complicated by acute renal failure.

## 2023-01-16 NOTE — PROGRESS NOTE ADULT - PROBLEM SELECTOR PLAN 3
Unclear baseline, but if this is indeed acute in nature, most likely pre-renal injury.  - F/u urine studies  - BMP q.d. Unclear baseline, but if this is indeed acute in nature, most likely pre-renal injury.  - F/u urine studies  - Consider 5% dextrose 100 mL/h IV for 10 h  - BMP q.d. Unclear baseline, but if this is indeed acute in nature, most likely pre-renal injury.  - F/u urine studies  - Will consider initiation of fluid therapy  - BMP q.d.

## 2023-01-16 NOTE — PROGRESS NOTE ADULT - PROBLEM SELECTOR PLAN 1
Likely in the setting of psychotic disorder. She has bipolar disorder on lithium but unclear adherence prior to admission as serum levels low.  - Psychiatry following, recommendations appreciated  - Risperidone 0.5 mg p.o. q.h.s.  - Haldol 2 mg IV q.6.h. p.r.n. agitation/aggression  - Transfer to [facility] when bed available

## 2023-01-16 NOTE — CHART NOTE - NSCHARTNOTEFT_GEN_A_CORE
Switching IV LT4 to 125mcg PO (home dose) today. Will obtain TSH and FT4 on 1/18. Discussed with Medicine resident.    Prnice Ivan DO, Endocrinology Fellow  For follow-up questions, discharge recommendations, or new consults please call answering service at [phone] (weekdays), [phone] (nights/weekends). For nonurgent matters, please email [email or web address] or [email or web address]. Switching IV LT4 to 125mcg PO (home dose) today. Will obtain TSH and FT4 on 1/18.     AM cortisol today 13.1. Indeterminate. Recommend a co-syntropin stim test tomorrow morning. To perform, measure a baseline ACTH and cortisol level. Then administer 250mcg co-syntropin. Thirty minutes after administration of co-syntropin, measure the cortisol level. Sixty minutes after administration of co-syntropin, measure the cortisol again. If the cortisol level after stimulation is >14.5, primary adrenal insufficiency and severe chronic secondary adrenal insufficiency are unlikely, and if >18 they are ruled out. No need to continue steroids at this time as patient is hemodynamically stable. If she becomes hemodynamically unstable, start stress dose steroids (50mg IV hydrocortisone q6h).    Discussed with Medicine resident.    Prince Ivan DO, Endocrinology Fellow  For follow-up questions, discharge recommendations, or new consults please call answering service at [phone] (weekdays), [phone] (nights/weekends). For nonurgent matters, please email [email or web address] or [email or web address].

## 2023-01-16 NOTE — PROGRESS NOTE ADULT - SUBJECTIVE AND OBJECTIVE BOX
PATIENT: MELO GRIGGS, MRN: [id number]    CHIEF COMPLAINT: Patient is a 52y old  Female who presents with a chief complaint of behavioral change; abnormal TFT (15 Maxim 2023 17:15)      INTERVAL HISTORY/OVERNIGHT EVENTS: No overnight events. At bedside, patient has been sleeping and eating well. Denies N/V/D/C. Last BM x1 regular yesterday. Denies abdominal pain. Denies chest pain or SOB, cough. Has been ambulating with assistance. Oriented to person, place, and time. Breathing comfortably on room air.    REVIEW OF SYSTEMS:    Constitutional:     [ ] negative [ ] fevers [ ] chills [ ] weight loss [ ] weight gain  HEENT:                  [ ] negative [ ] dry eyes [ ] eye irritation [ ] postnasal drip [ ] nasal congestion  CV:                         [ ] negative  [ ] chest pain [ ] orthopnea [ ] palpitations [ ] murmur  Resp:                     [ ] negative [ ] cough [ ] shortness of breath [ ] dyspnea [ ] wheezing [ ] sputum [ ] hemoptysis  GI:                          [ ] negative [ ] nausea [ ] vomiting [ ] diarrhea [ ] constipation [ ] abd pain [ ] dysphagia   :                        [ ] negative [ ] dysuria [ ] nocturia [ ] hematuria [ ] increased urinary frequency  Musculoskeletal: [ ] negative [ ] back pain [ ] myalgias [ ] arthralgias [ ] fracture  Skin:                       [ ] negative [ ] rash [ ] itch  Neurological:        [ ] negative [ ] headache [ ] dizziness [ ] syncope [ ] weakness [ ] numbness  Psychiatric:           [ ] negative [ ] anxiety [ ] depression  Endocrine:            [ ] negative [ ] diabetes [ ] thyroid problem  Heme/Lymph:      [ ] negative [ ] anemia [ ] bleeding problem  Allergic/Immune: [ ] negative [ ] itchy eyes [ ] nasal discharge [ ] hives [ ] angioedema    [ ] All other systems negative  [ ] Unable to assess ROS because ________.    MEDICATIONS:  MEDICATIONS  (STANDING):  dextrose 5%. 1000 milliLiter(s) (50 mL/Hr) IV Continuous <Continuous>  dextrose 5%. 1000 milliLiter(s) (100 mL/Hr) IV Continuous <Continuous>  dextrose 50% Injectable 25 Gram(s) IV Push once  dextrose 50% Injectable 12.5 Gram(s) IV Push once  dextrose 50% Injectable 25 Gram(s) IV Push once  glucagon  Injectable 1 milliGRAM(s) IntraMuscular once  heparin   Injectable 5000 Unit(s) SubCutaneous every 8 hours  insulin lispro (ADMELOG) corrective regimen sliding scale   SubCutaneous three times a day before meals  insulin lispro (ADMELOG) corrective regimen sliding scale   SubCutaneous at bedtime  levothyroxine Injectable 100 MICROGram(s) IV Push <User Schedule>  risperiDONE   Tablet 0.5 milliGRAM(s) Oral <User Schedule>    MEDICATIONS  (PRN):  acetaminophen     Tablet .. 650 milliGRAM(s) Oral every 6 hours PRN Temp greater or equal to 38C (100.4F), Mild Pain (1 - 3)  dextrose Oral Gel 15 Gram(s) Oral once PRN Blood Glucose LESS THAN 70 milliGRAM(s)/deciliter  haloperidol    Injectable 2 milliGRAM(s) IV Push every 6 hours PRN Agitation  LORazepam   Injectable 2 milliGRAM(s) IntraMuscular every 6 hours PRN Agitation      ALLERGIES: Allergies    No Known Allergies    OBJECTIVE:  ICU Vital Signs Last 24 Hrs  T(C): 37 (2023 05:39), Max: 37.3 (15 Maxim 2023 13:58)  T(F): 98.6 (2023 05:39), Max: 99.2 (15 Maxim 2023 13:58)  HR: 87 (2023 05:39) (60 - 87)  BP: 137/85 (2023 05:39) (137/85 - 152/86)  BP(mean): --  ABP: --  ABP(mean): --  RR: 19 (2023 05:39) (18 - 19)  SpO2: 100% (2023 05:39) (100% - 100%)    O2 Parameters below as of 2023 05:39  Patient On (Oxygen Delivery Method): room air      CAPILLARY BLOOD GLUCOSE  98 (15 Maxim 2023 12:30)      POCT Blood Glucose.: 151 mg/dL (15 Maxim 2023 22:00)  POCT Blood Glucose.: 84 mg/dL (15 Maxim 2023 17:07)  POCT Blood Glucose.: 85 mg/dL (15 Maxim 2023 07:36)    CAPILLARY BLOOD GLUCOSE  98 (15 Maxim 2023 12:30)      POCT Blood Glucose.: 151 mg/dL (15 Maxim 2023 22:00)    I&O's Summary    15 Maxim 2023 07:01  -  2023 07:00  --------------------------------------------------------  IN: 980 mL / OUT: 0 mL / NET: 980 mL      PHYSICAL EXAMINATION:  General: Comfortable, no acute distress, cooperative with exam.  HEENT: PERRLA, EOMI, moist mucous membranes.  Respiratory: CTAB, normal respiratory effort, no coughing, wheezes, crackles, or rales.  CV: RRR, S1S2, no murmurs, rubs or gallops. No JVD. Distal pulses intact.  Abdominal: Soft, nontender, nondistended, no rebound or guarding, normal bowel sounds.  Neurology: AOx3, no focal neuro defects, PARNELL x 4.  Extremities: No pitting edema, + Peripheral pulses.  Incisions:   Tubes:    LABS:                          11.6   9.29  )-----------( 284      ( 15 Maxim 2023 05:05 )             37.5     01-15    148<H>  |  111<H>  |  18  ----------------------------<  78  3.7   |  24  |  1.77<H>    Ca    9.5      15 Maxim 2023 05:05  Phos  2.8     01-15  Mg     1.90     01-15      Urinalysis Basic - ( 2023 15:37 )    Color: Colorless / Appearance: Clear / S.004 / pH: x  Gluc: x / Ketone: Negative  / Bili: Negative / Urobili: <2 mg/dL   Blood: x / Protein: Trace / Nitrite: Negative   Leuk Esterase: Negative / RBC: x / WBC x   Sq Epi: x / Non Sq Epi: x / Bacteria: x      EKG: Reviewed    IMAGING: Studies reviewed   PATIENT: MELO GRIGGS, MRN: [id number]    CHIEF COMPLAINT: Patient is a 52y old  Female who presents with a chief complaint of behavioral change; abnormal TFT (15 Maxim 2023 17:15)      INTERVAL HISTORY/OVERNIGHT EVENTS: No overnight events. She is unsure why she is in the hospital. She states she has been taking her lithium and levothyroxine. Her only complaint is tiredness during the day. She has been sleeping well at night, eating well, drinking well. She has no other concerns.    REVIEW OF SYSTEMS:    Constitutional:     [x] negative [ ] fevers [ ] chills [ ] weight loss [ ] weight gain  CV:                         [x] negative  [ ] chest pain [ ] palpitations  Resp:                     [x] negative [ ] cough [ ] shortness of breath [ ] dyspnea  GI:                          [x] negative [ ] nausea [ ] vomiting [ ] diarrhea [ ] constipation [ ] abd pain  :                        [x] negative [ ] dysuria [ ] increased urinary frequency    [ ] All other systems negative  [ ] Unable to assess ROS because ________.    MEDICATIONS:  MEDICATIONS  (STANDING):  dextrose 5%. 1000 milliLiter(s) (50 mL/Hr) IV Continuous <Continuous>  dextrose 5%. 1000 milliLiter(s) (100 mL/Hr) IV Continuous <Continuous>  dextrose 50% Injectable 25 Gram(s) IV Push once  dextrose 50% Injectable 12.5 Gram(s) IV Push once  dextrose 50% Injectable 25 Gram(s) IV Push once  glucagon  Injectable 1 milliGRAM(s) IntraMuscular once  heparin   Injectable 5000 Unit(s) SubCutaneous every 8 hours  insulin lispro (ADMELOG) corrective regimen sliding scale   SubCutaneous three times a day before meals  insulin lispro (ADMELOG) corrective regimen sliding scale   SubCutaneous at bedtime  levothyroxine Injectable 100 MICROGram(s) IV Push <User Schedule>  risperiDONE   Tablet 0.5 milliGRAM(s) Oral <User Schedule>    MEDICATIONS  (PRN):  acetaminophen     Tablet .. 650 milliGRAM(s) Oral every 6 hours PRN Temp greater or equal to 38C (100.4F), Mild Pain (1 - 3)  dextrose Oral Gel 15 Gram(s) Oral once PRN Blood Glucose LESS THAN 70 milliGRAM(s)/deciliter  haloperidol    Injectable 2 milliGRAM(s) IV Push every 6 hours PRN Agitation  LORazepam   Injectable 2 milliGRAM(s) IntraMuscular every 6 hours PRN Agitation      ALLERGIES: Allergies    No Known Allergies    OBJECTIVE:  ICU Vital Signs Last 24 Hrs  T(C): 37 (2023 05:39), Max: 37.3 (15 Maxim 2023 13:58)  T(F): 98.6 (2023 05:39), Max: 99.2 (15 Maxim 2023 13:58)  HR: 87 (2023 05:39) (60 - 87)  BP: 137/85 (2023 05:39) (137/85 - 152/86)  BP(mean): --  ABP: --  ABP(mean): --  RR: 19 (2023 05:39) (18 - 19)  SpO2: 100% (2023 05:39) (100% - 100%)    O2 Parameters below as of 2023 05:39  Patient On (Oxygen Delivery Method): room air      CAPILLARY BLOOD GLUCOSE  98 (15 Maxim 2023 12:30)      POCT Blood Glucose.: 151 mg/dL (15 Maxim 2023 22:00)  POCT Blood Glucose.: 84 mg/dL (15 Maxim 2023 17:07)  POCT Blood Glucose.: 85 mg/dL (15 Maxim 2023 07:36)    CAPILLARY BLOOD GLUCOSE  98 (15 Maxim 2023 12:30)      POCT Blood Glucose.: 151 mg/dL (15 Maxim 2023 22:00)    I&O's Summary    15 Maxim 2023 07:01  -  2023 07:00  --------------------------------------------------------  IN: 980 mL / OUT: 0 mL / NET: 980 mL      PHYSICAL EXAMINATION:  General: Comfortable, no acute distress, cooperative with exam.  Respiratory: CTAB, normal respiratory effort  CV: RRR, S1S2, no murmurs, rubs or gallops.  Abdominal: Soft, nontender, nondistended  Neurology: AOx2, PARNELL x 4.  Extremities: No pitting edema    LABS:                          11.6   9.29  )-----------( 284      ( 15 Maxim 2023 05:05 )             37.5     01-15    148<H>  |  111<H>  |  18  ----------------------------<  78  3.7   |  24  |  1.77<H>    Ca    9.5      15 Maxim 2023 05:05  Phos  2.8     01-15  Mg     1.90     01-15      Urinalysis Basic - ( 2023 15:37 )    Color: Colorless / Appearance: Clear / S.004 / pH: x  Gluc: x / Ketone: Negative  / Bili: Negative / Urobili: <2 mg/dL   Blood: x / Protein: Trace / Nitrite: Negative   Leuk Esterase: Negative / RBC: x / WBC x   Sq Epi: x / Non Sq Epi: x / Bacteria: x      EKG: Reviewed    IMAGING: Studies reviewed

## 2023-01-16 NOTE — PROGRESS NOTE ADULT - PROBLEM SELECTOR PLAN 2
Reported to be on Synthroid 125 mcg q.d., likely also non-adherent with significant elevation in TSH. Initially presenting with some lethargy. Completed steroid taper (1/15) due to concern for adrenal insufficiency.  - Endocrinology recommendations appreciated  - Levothyroxine 100 mcg q.d.  - Check fT4 on 1/18  - F/u a.m. cortisol and ACTH (1/16) Reported to be on Synthroid 125 mcg q.d., likely also non-adherent with significant elevation in TSH. Initially presenting with some lethargy. Completed steroid taper (1/15) due to concern for adrenal insufficiency.  - Endocrinology recommendations appreciated  - Levothyroxine 100 mcg IV q.d., will determine whether this can be converted to p.o.  - Check fT4 on 1/18  - F/u a.m. cortisol and ACTH (1/16)

## 2023-01-16 NOTE — PROGRESS NOTE ADULT - PROBLEM SELECTOR PLAN 4
Heparin 5000 U s.c. q.8.h.  Regular diet  Disposition: Likely requires inpatient psychiatric admission

## 2023-01-17 LAB
ACTH SER-ACNC: 11.7 PG/ML — SIGNIFICANT CHANGE UP (ref 7.2–63.3)
ANION GAP SERPL CALC-SCNC: 9 MMOL/L — SIGNIFICANT CHANGE UP (ref 7–14)
BUN SERPL-MCNC: 17 MG/DL — SIGNIFICANT CHANGE UP (ref 7–23)
CALCIUM SERPL-MCNC: 9.7 MG/DL — SIGNIFICANT CHANGE UP (ref 8.4–10.5)
CHLORIDE SERPL-SCNC: 114 MMOL/L — HIGH (ref 98–107)
CO2 SERPL-SCNC: 26 MMOL/L — SIGNIFICANT CHANGE UP (ref 22–31)
CORTIS AM PEAK SERPL-MCNC: 17.5 UG/DL — SIGNIFICANT CHANGE UP (ref 6–18.4)
CORTIS AM PEAK SERPL-MCNC: 30.5 UG/DL — HIGH (ref 6–18.4)
CORTIS AM PEAK SERPL-MCNC: 33.8 UG/DL — HIGH (ref 6–18.4)
CREAT SERPL-MCNC: 1.61 MG/DL — HIGH (ref 0.5–1.3)
EGFR: 38 ML/MIN/1.73M2 — LOW
GLUCOSE BLDC GLUCOMTR-MCNC: 106 MG/DL — HIGH (ref 70–99)
GLUCOSE BLDC GLUCOMTR-MCNC: 141 MG/DL — HIGH (ref 70–99)
GLUCOSE BLDC GLUCOMTR-MCNC: 144 MG/DL — HIGH (ref 70–99)
GLUCOSE SERPL-MCNC: 83 MG/DL — SIGNIFICANT CHANGE UP (ref 70–99)
HCT VFR BLD CALC: 39.4 % — SIGNIFICANT CHANGE UP (ref 34.5–45)
HGB BLD-MCNC: 11.7 G/DL — SIGNIFICANT CHANGE UP (ref 11.5–15.5)
MAGNESIUM SERPL-MCNC: 2.1 MG/DL — SIGNIFICANT CHANGE UP (ref 1.6–2.6)
MCHC RBC-ENTMCNC: 27.1 PG — SIGNIFICANT CHANGE UP (ref 27–34)
MCHC RBC-ENTMCNC: 29.7 GM/DL — LOW (ref 32–36)
MCV RBC AUTO: 91.4 FL — SIGNIFICANT CHANGE UP (ref 80–100)
NRBC # BLD: 0 /100 WBCS — SIGNIFICANT CHANGE UP (ref 0–0)
NRBC # FLD: 0 K/UL — SIGNIFICANT CHANGE UP (ref 0–0)
PHOSPHATE SERPL-MCNC: 3.4 MG/DL — SIGNIFICANT CHANGE UP (ref 2.5–4.5)
PLATELET # BLD AUTO: 296 K/UL — SIGNIFICANT CHANGE UP (ref 150–400)
POTASSIUM SERPL-MCNC: 4.2 MMOL/L — SIGNIFICANT CHANGE UP (ref 3.5–5.3)
POTASSIUM SERPL-SCNC: 4.2 MMOL/L — SIGNIFICANT CHANGE UP (ref 3.5–5.3)
RBC # BLD: 4.31 M/UL — SIGNIFICANT CHANGE UP (ref 3.8–5.2)
RBC # FLD: 14 % — SIGNIFICANT CHANGE UP (ref 10.3–14.5)
SODIUM SERPL-SCNC: 149 MMOL/L — HIGH (ref 135–145)
WBC # BLD: 6.41 K/UL — SIGNIFICANT CHANGE UP (ref 3.8–10.5)
WBC # FLD AUTO: 6.41 K/UL — SIGNIFICANT CHANGE UP (ref 3.8–10.5)

## 2023-01-17 PROCEDURE — 99231 SBSQ HOSP IP/OBS SF/LOW 25: CPT

## 2023-01-17 PROCEDURE — 99233 SBSQ HOSP IP/OBS HIGH 50: CPT

## 2023-01-17 PROCEDURE — 99232 SBSQ HOSP IP/OBS MODERATE 35: CPT | Mod: GC

## 2023-01-17 RX ORDER — SODIUM CHLORIDE 9 MG/ML
1000 INJECTION, SOLUTION INTRAVENOUS
Refills: 0 | Status: DISCONTINUED | OUTPATIENT
Start: 2023-01-17 | End: 2023-01-18

## 2023-01-17 RX ORDER — SODIUM CHLORIDE 9 MG/ML
1000 INJECTION, SOLUTION INTRAVENOUS
Refills: 0 | Status: DISCONTINUED | OUTPATIENT
Start: 2023-01-17 | End: 2023-01-17

## 2023-01-17 RX ADMIN — HEPARIN SODIUM 5000 UNIT(S): 5000 INJECTION INTRAVENOUS; SUBCUTANEOUS at 06:26

## 2023-01-17 RX ADMIN — HEPARIN SODIUM 5000 UNIT(S): 5000 INJECTION INTRAVENOUS; SUBCUTANEOUS at 14:16

## 2023-01-17 RX ADMIN — COSYNTROPIN 0.25 MILLIGRAM(S): 0.25 INJECTION, SOLUTION INTRAVENOUS at 06:22

## 2023-01-17 RX ADMIN — SODIUM CHLORIDE 100 MILLILITER(S): 9 INJECTION, SOLUTION INTRAVENOUS at 11:15

## 2023-01-17 RX ADMIN — SODIUM CHLORIDE 100 MILLILITER(S): 9 INJECTION, SOLUTION INTRAVENOUS at 23:44

## 2023-01-17 RX ADMIN — HEPARIN SODIUM 5000 UNIT(S): 5000 INJECTION INTRAVENOUS; SUBCUTANEOUS at 22:02

## 2023-01-17 RX ADMIN — RISPERIDONE 0.5 MILLIGRAM(S): 4 TABLET ORAL at 22:02

## 2023-01-17 NOTE — PROGRESS NOTE ADULT - SUBJECTIVE AND OBJECTIVE BOX
Chief Complaint: Severe hypothyroidism    History: sitting up in bed, eating lunch meal, denies complaints or symptoms at this time    MEDICATIONS  (STANDING):  dextrose 5%. 1000 milliLiter(s) (100 mL/Hr) IV Continuous <Continuous>  dextrose 5%. 1000 milliLiter(s) (50 mL/Hr) IV Continuous <Continuous>  dextrose 5%. 1000 milliLiter(s) (100 mL/Hr) IV Continuous <Continuous>  dextrose 50% Injectable 25 Gram(s) IV Push once  dextrose 50% Injectable 12.5 Gram(s) IV Push once  dextrose 50% Injectable 25 Gram(s) IV Push once  glucagon  Injectable 1 milliGRAM(s) IntraMuscular once  heparin   Injectable 5000 Unit(s) SubCutaneous every 8 hours  insulin lispro (ADMELOG) corrective regimen sliding scale   SubCutaneous three times a day before meals  insulin lispro (ADMELOG) corrective regimen sliding scale   SubCutaneous at bedtime  levothyroxine 125 MICROGram(s) Oral daily  risperiDONE   Tablet 0.5 milliGRAM(s) Oral <User Schedule>    MEDICATIONS  (PRN):  acetaminophen     Tablet .. 650 milliGRAM(s) Oral every 6 hours PRN Temp greater or equal to 38C (100.4F), Mild Pain (1 - 3)  dextrose Oral Gel 15 Gram(s) Oral once PRN Blood Glucose LESS THAN 70 milliGRAM(s)/deciliter  haloperidol    Injectable 2 milliGRAM(s) IV Push every 6 hours PRN Agitation  LORazepam   Injectable 2 milliGRAM(s) IntraMuscular every 6 hours PRN Agitation      Allergies    No Known Allergies    Intolerances      Review of Systems:  ALL OTHER SYSTEMS REVIEWED AND NEGATIVE        PHYSICAL EXAM:  VITALS: T(C): 36.8 (01-17-23 @ 09:42)  T(F): 98.3 (01-17-23 @ 09:42), Max: 99 (01-16-23 @ 16:44)  HR: 87 (01-17-23 @ 09:42) (79 - 87)  BP: 414/89 (01-17-23 @ 09:42) (132/87 - 414/89)  RR:  (18 - 20)  SpO2:  (100% - 100%)  Wt(kg): --  GENERAL: NAD, well-groomed, well-developed  EYES: No proptosis, no lid lag, anicteric  HEENT:  Atraumatic, Normocephalic, moist mucous membranes  RESPIRATORY: nonlabored respirations, no wheezing  PSYCH: calm, cooperative, flat affect    CAPILLARY BLOOD GLUCOSE      POCT Blood Glucose.: 106 mg/dL (17 Jan 2023 12:09)  POCT Blood Glucose.: 90 mg/dL (17 Jan 2023 07:27)  POCT Blood Glucose.: 84 mg/dL (16 Jan 2023 21:47)  POCT Blood Glucose.: 92 mg/dL (16 Jan 2023 16:56)      01-17    149<H>  |  114<H>  |  17  ----------------------------<  83  4.2   |  26  |  1.61<H>    eGFR: 38<L>    Ca    9.7      01-17  Mg     2.10     01-17  Phos  3.4     01-17    TPro  6.6  /  Alb  3.9  /  TBili  <0.2  /  DBili  x   /  AST  27  /  ALT  22  /  AlkPhos  101  01-16      A1C with Estimated Average Glucose Result: 5.5 % (01-12-23 @ 14:10)      Thyroid Function Tests:  01-15 @ 05:05 .10 FreeT4 0.5 T3 -- Anti TPO -- Anti Thyroglobulin Ab -- TSI --  01-12 @ 14:10 TSH -- FreeT4 -- T3 -- Anti TPO 19.3 Anti Thyroglobulin Ab -- TSI --

## 2023-01-17 NOTE — PROGRESS NOTE ADULT - ATTENDING COMMENTS
52F with bipolar disorder, hypothyroidism, admitted for acute encephalopathy most likely in the setting of psychosis due to lithium nonadherence with course complicated by acute renal failure.  Patient is clinically stable , Endo follow up appreciated, f/w recs.  SW consult for DC /placement

## 2023-01-17 NOTE — PROGRESS NOTE ADULT - SUBJECTIVE AND OBJECTIVE BOX
PATIENT: MELO GRIGGS, MRN: [id number]    CHIEF COMPLAINT: Patient is a 52y old  Female who presents with a chief complaint of behavioral change; abnormal TFT (16 Jan 2023 07:23)      INTERVAL HISTORY/OVERNIGHT EVENTS: No overnight events. Patient states she feels well, fatigue is still present but improving. She has no other complaints    REVIEW OF SYSTEMS:    Constitutional:     [x] negative [ ] fevers [ ] chills  CV:                         [x] negative  [ ] chest pain [ ] palpitations  Resp:                     [x] negative [ ] cough [ ] shortness of breath [ ] dyspnea  GI:                          [x] negative [ ] nausea [ ] vomiting [ ] diarrhea [ ] constipation [ ] abd pain  :                        [x] negative [ ] dysuria [ ] increased urinary frequency    [ ] All other systems negative  [ ] Unable to assess ROS because ________.    MEDICATIONS:  MEDICATIONS  (STANDING):  dextrose 5%. 1000 milliLiter(s) (50 mL/Hr) IV Continuous <Continuous>  dextrose 5%. 1000 milliLiter(s) (100 mL/Hr) IV Continuous <Continuous>  dextrose 50% Injectable 25 Gram(s) IV Push once  dextrose 50% Injectable 12.5 Gram(s) IV Push once  dextrose 50% Injectable 25 Gram(s) IV Push once  glucagon  Injectable 1 milliGRAM(s) IntraMuscular once  heparin   Injectable 5000 Unit(s) SubCutaneous every 8 hours  insulin lispro (ADMELOG) corrective regimen sliding scale   SubCutaneous three times a day before meals  insulin lispro (ADMELOG) corrective regimen sliding scale   SubCutaneous at bedtime  levothyroxine 125 MICROGram(s) Oral daily  risperiDONE   Tablet 0.5 milliGRAM(s) Oral <User Schedule>    MEDICATIONS  (PRN):  acetaminophen     Tablet .. 650 milliGRAM(s) Oral every 6 hours PRN Temp greater or equal to 38C (100.4F), Mild Pain (1 - 3)  dextrose Oral Gel 15 Gram(s) Oral once PRN Blood Glucose LESS THAN 70 milliGRAM(s)/deciliter  haloperidol    Injectable 2 milliGRAM(s) IV Push every 6 hours PRN Agitation  LORazepam   Injectable 2 milliGRAM(s) IntraMuscular every 6 hours PRN Agitation      ALLERGIES: Allergies    No Known Allergies    OBJECTIVE:  ICU Vital Signs Last 24 Hrs  T(C): 36.9 (17 Jan 2023 05:24), Max: 37.2 (16 Jan 2023 16:44)  T(F): 98.5 (17 Jan 2023 05:24), Max: 99 (16 Jan 2023 16:44)  HR: 79 (17 Jan 2023 05:24) (79 - 89)  BP: 136/80 (17 Jan 2023 05:24) (132/87 - 138/83)  BP(mean): --  ABP: --  ABP(mean): --  RR: 18 (17 Jan 2023 05:24) (18 - 19)  SpO2: 100% (17 Jan 2023 05:24) (100% - 100%)    O2 Parameters below as of 17 Jan 2023 05:24  Patient On (Oxygen Delivery Method): room air      CAPILLARY BLOOD GLUCOSE      POCT Blood Glucose.: 90 mg/dL (17 Jan 2023 07:27)  POCT Blood Glucose.: 84 mg/dL (16 Jan 2023 21:47)  POCT Blood Glucose.: 92 mg/dL (16 Jan 2023 16:56)  POCT Blood Glucose.: 103 mg/dL (16 Jan 2023 11:46)    CAPILLARY BLOOD GLUCOSE  98 (15 Maxim 2023 12:30)      POCT Blood Glucose.: 90 mg/dL (17 Jan 2023 07:27)    I&O's Summary    16 Jan 2023 07:01  -  17 Jan 2023 07:00  --------------------------------------------------------  IN: 600 mL / OUT: 0 mL / NET: 600 mL      PHYSICAL EXAMINATION:  General: Comfortable, no acute distress, cooperative with exam.  Respiratory: CTAB, normal respiratory effort  CV: RRR, S1S2, no murmurs, rubs or gallops.  Abdominal: Soft, nontender, nondistended  Neurology: AOx2, PARNELL x 4.  Extremities: No pitting edema    LABS:      01-16    143  |  109<H>  |  16  ----------------------------<  147<H>  3.7   |  21<L>  |  1.67<H>    Ca    9.1      16 Jan 2023 09:14  Phos  2.5     01-16  Mg     1.80     01-16    TPro  6.6  /  Alb  3.9  /  TBili  <0.2  /  DBili  x   /  AST  27  /  ALT  22  /  AlkPhos  101  01-16    LIVER FUNCTIONS - ( 16 Jan 2023 09:14 )  Alb: 3.9 g/dL / Pro: 6.6 g/dL / ALK PHOS: 101 U/L / ALT: 22 U/L / AST: 27 U/L / GGT: x           EKG: Reviewed    IMAGING: Studies reviewed

## 2023-01-17 NOTE — PROGRESS NOTE ADULT - PROBLEM SELECTOR PLAN 3
Unclear baseline, but if this is indeed acute in nature, most likely pre-renal injury.  - Will consider initiation of fluid therapy  - BMP q.d.

## 2023-01-17 NOTE — PROGRESS NOTE ADULT - ASSESSMENT
52 F Hx T2DM on metformin, bipolar (on lithium, domiciled w/ mother), HLD, Hypothyroidism, adm for AMS w/ endocrinology consulted for question of Myxedema Coma. Complex patient high level decision making.    1) Severe Hypothyroidism   Pt on Levothyroxine 125mcg per surescripts recently filled but per patient has not been taking it for a long time; likely correlated given lithium level also undetectable. Will need later collateral for TFTs on stable dosing. Less likely 2/2 acute Lithium; possibly i/s/o  levothyroxine nonadherence. Overall patient w/ severe hypothyroidism possibly impending myxedema coma. Now more active.   - TSH: 410. T3 total: <30. Free Thyroxine: <0.3 TPO: 19.3 (normal). Cortisol: 9.6  - Repeat TSH on 1/15 - 370 and FT4 0.5 which is mildly improved.  - Physical exam noted for ams though hard to delineate from known psychiatric baseline disease, Mild facial swelling and thyroid enlargement w/ areas of hyperpigmentation around neck and legs. No overt signs of adrenal insufficiency given afebrile/normotension/normocardia.   Recommendation:   - Continue Levothyroxine 125 mcg po daily, Give in AM on empty stomach. Will need to ensure adherence after discharge.  Check FREE T4 on 1/18 (not TOTAL T4)  - Stim test performed today: cortisol peaked at 33.8 - not indicative of adrenal insufficiency. Continue off hydrocortisone at this time.    2) T2DM  - On metformin 500 daily per surescripts  - A1c 5.5  -BG well controlled  -Ordered for low correction scales, not requiring    3) HLD   - On rosuvastatin 10mg per surescripts   - would check lipid panel outpatient once thyroid levels have stabilized 2/2 associated dyslipidemia    Alvaro Jonas MD  Division of Endocrinology  Pager: [phone]    If after 6PM or before 9AM, or on weekends/holidays, please call endocrine answering service for assistance ([phone]).  For nonurgent matters email [email or web address] for assistance.

## 2023-01-17 NOTE — PROGRESS NOTE ADULT - PROBLEM SELECTOR PLAN 2
Reported to be on Synthroid 125 mcg q.d., likely also non-adherent with significant elevation in TSH. Initially presenting with some lethargy. Completed steroid taper (1/15) due to concern for adrenal insufficiency.  - Endocrinology recommendations appreciated  - Levothyroxine 125 mcg p.o. q.d.  - Check fT4 on 1/18  - F/u cosyntropin stimulation test

## 2023-01-18 LAB
ANION GAP SERPL CALC-SCNC: 12 MMOL/L — SIGNIFICANT CHANGE UP (ref 7–14)
BUN SERPL-MCNC: 15 MG/DL — SIGNIFICANT CHANGE UP (ref 7–23)
CALCIUM SERPL-MCNC: 8.3 MG/DL — LOW (ref 8.4–10.5)
CHLORIDE SERPL-SCNC: 112 MMOL/L — HIGH (ref 98–107)
CO2 SERPL-SCNC: 23 MMOL/L — SIGNIFICANT CHANGE UP (ref 22–31)
CREAT SERPL-MCNC: 1.46 MG/DL — HIGH (ref 0.5–1.3)
EGFR: 43 ML/MIN/1.73M2 — LOW
GLUCOSE BLDC GLUCOMTR-MCNC: 105 MG/DL — HIGH (ref 70–99)
GLUCOSE BLDC GLUCOMTR-MCNC: 110 MG/DL — HIGH (ref 70–99)
GLUCOSE BLDC GLUCOMTR-MCNC: 83 MG/DL — SIGNIFICANT CHANGE UP (ref 70–99)
GLUCOSE SERPL-MCNC: 95 MG/DL — SIGNIFICANT CHANGE UP (ref 70–99)
HCT VFR BLD CALC: 36 % — SIGNIFICANT CHANGE UP (ref 34.5–45)
HGB BLD-MCNC: 11 G/DL — LOW (ref 11.5–15.5)
MAGNESIUM SERPL-MCNC: 1.9 MG/DL — SIGNIFICANT CHANGE UP (ref 1.6–2.6)
MCHC RBC-ENTMCNC: 27.5 PG — SIGNIFICANT CHANGE UP (ref 27–34)
MCHC RBC-ENTMCNC: 30.6 GM/DL — LOW (ref 32–36)
MCV RBC AUTO: 90 FL — SIGNIFICANT CHANGE UP (ref 80–100)
NRBC # BLD: 0 /100 WBCS — SIGNIFICANT CHANGE UP (ref 0–0)
NRBC # FLD: 0 K/UL — SIGNIFICANT CHANGE UP (ref 0–0)
PHOSPHATE SERPL-MCNC: 4.1 MG/DL — SIGNIFICANT CHANGE UP (ref 2.5–4.5)
PLATELET # BLD AUTO: 266 K/UL — SIGNIFICANT CHANGE UP (ref 150–400)
POTASSIUM SERPL-MCNC: 3.3 MMOL/L — LOW (ref 3.5–5.3)
POTASSIUM SERPL-SCNC: 3.3 MMOL/L — LOW (ref 3.5–5.3)
RBC # BLD: 4 M/UL — SIGNIFICANT CHANGE UP (ref 3.8–5.2)
RBC # FLD: 13.9 % — SIGNIFICANT CHANGE UP (ref 10.3–14.5)
SODIUM SERPL-SCNC: 147 MMOL/L — HIGH (ref 135–145)
T4 FREE SERPL-MCNC: 0.7 NG/DL — LOW (ref 0.9–1.8)
WBC # BLD: 8.72 K/UL — SIGNIFICANT CHANGE UP (ref 3.8–10.5)
WBC # FLD AUTO: 8.72 K/UL — SIGNIFICANT CHANGE UP (ref 3.8–10.5)

## 2023-01-18 PROCEDURE — 99231 SBSQ HOSP IP/OBS SF/LOW 25: CPT

## 2023-01-18 PROCEDURE — 99232 SBSQ HOSP IP/OBS MODERATE 35: CPT | Mod: GC

## 2023-01-18 RX ORDER — POTASSIUM CHLORIDE 20 MEQ
40 PACKET (EA) ORAL ONCE
Refills: 0 | Status: COMPLETED | OUTPATIENT
Start: 2023-01-18 | End: 2023-01-18

## 2023-01-18 RX ORDER — SODIUM CHLORIDE 9 MG/ML
1000 INJECTION, SOLUTION INTRAVENOUS
Refills: 0 | Status: DISCONTINUED | OUTPATIENT
Start: 2023-01-18 | End: 2023-01-20

## 2023-01-18 RX ORDER — RISPERIDONE 4 MG/1
1 TABLET ORAL DAILY
Refills: 0 | Status: DISCONTINUED | OUTPATIENT
Start: 2023-01-18 | End: 2023-01-26

## 2023-01-18 RX ADMIN — Medication 1: at 12:26

## 2023-01-18 RX ADMIN — SODIUM CHLORIDE 100 MILLILITER(S): 9 INJECTION, SOLUTION INTRAVENOUS at 13:04

## 2023-01-18 RX ADMIN — RISPERIDONE 1 MILLIGRAM(S): 4 TABLET ORAL at 19:09

## 2023-01-18 RX ADMIN — HEPARIN SODIUM 5000 UNIT(S): 5000 INJECTION INTRAVENOUS; SUBCUTANEOUS at 21:35

## 2023-01-18 RX ADMIN — Medication 125 MICROGRAM(S): at 06:03

## 2023-01-18 RX ADMIN — HEPARIN SODIUM 5000 UNIT(S): 5000 INJECTION INTRAVENOUS; SUBCUTANEOUS at 06:03

## 2023-01-18 RX ADMIN — Medication 40 MILLIEQUIVALENT(S): at 13:03

## 2023-01-18 RX ADMIN — HEPARIN SODIUM 5000 UNIT(S): 5000 INJECTION INTRAVENOUS; SUBCUTANEOUS at 13:04

## 2023-01-18 NOTE — PROGRESS NOTE ADULT - PROBLEM SELECTOR PLAN 3
Unclear baseline, but if this is indeed acute in nature, most likely pre-renal injury.  - Dextrose 5% 100 mL/h for 24 h  - BMP q.d.

## 2023-01-18 NOTE — PROGRESS NOTE ADULT - PROBLEM SELECTOR PLAN 1
Likely in the setting of psychotic disorder. She has bipolar disorder on lithium but unclear adherence prior to admission as serum levels low.  - Psychiatry following, recommendations appreciated  - Risperidone 1 mg p.o. q.h.s.  - Haldol 2 mg IV q.6.h. p.r.n. agitation/aggression

## 2023-01-18 NOTE — PROGRESS NOTE ADULT - ATTENDING COMMENTS
52F with bipolar disorder, hypothyroidism, admitted for acute encephalopathy most likely in the setting of psychosis due to lithium nonadherence with course complicated by acute renal failure.  Patient is clinically stable , f/w Endo recs  F/w labs , correct electrolytes PRN   DC planning

## 2023-01-18 NOTE — CHART NOTE - NSCHARTNOTEFT_GEN_A_CORE
52 F Hx T2DM on metformin, bipolar (on lithium, domiciled w/ mother), HLD, Hypothyroidism, adm for AMS w/ endocrinology consulted for question of Myxedema Coma.     1) Severe Hypothyroidism   Pt on Levothyroxine 125mcg per surescripts recently filled but per patient has not been taking it for a long time; likely correlated given lithium level also undetectable. Will need later collateral for TFTs on stable dosing. Less likely 2/2 acute Lithium; possibly i/s/o  levothyroxine nonadherence. Overall patient w/ severe hypothyroidism possibly impending myxedema coma. Now more active.   - TSH: 410. T3 total: <30. Free Thyroxine: <0.3 TPO: 19.3 (normal). Cortisol: 9.6  - Repeat TSH on 1/15 - 370 and FT4 0.5 which is mildly improved.      Recommendation:   - Continue Levothyroxine 125 mcg po daily, Give in AM on empty stomach. Will need to ensure adherence after discharge.  - Stim test performed 1/17: cortisol peaked at 33.8 - not indicative of adrenal insufficiency. Continue off hydrocortisone at this time.      plan for 1/18:  please see full endocrine note from 1/17-   Check FREE T4 on 1/18 (not TOTAL T4) no need to check TSH - this is too soon to check   once this results, please inform endocrine via email at [facility]       d/w resident Gama Forte MD  Attending Physician   Department of Endocrinology, Diabetes and Metabolism     weekdays: 9am to 5pm: [facility] or [facility] and or TEAMS     Nights and weekends: [phone]  Please note that this patient may be followed by a different provider tomorrow.   If no answer or after hours, please contact [phone].  For final dc reccomendations, please call [phone] or page the endocrine fellow on call.

## 2023-01-18 NOTE — PROGRESS NOTE ADULT - PROBLEM SELECTOR PLAN 2
Reported to be on Synthroid 125 mcg q.d., likely also non-adherent with significant elevation in TSH. Initially presenting with some lethargy. Completed steroid taper (1/15) due to concern for adrenal insufficiency.  - Endocrinology recommendations appreciated  - Levothyroxine 125 mcg p.o. q.d.  - Check fT4 on 1/18

## 2023-01-18 NOTE — PROGRESS NOTE ADULT - PROBLEM SELECTOR PLAN 5
Heparin 5000 U s.c. q.8.h.  Regular diet  Disposition: Less likely to require psychiatric admission, pending placement

## 2023-01-18 NOTE — PROGRESS NOTE ADULT - PROBLEM SELECTOR PLAN 4
Reportedly prescribed rosuvastatin 10 mg p.o. q.d.  - Will need outpatient lipid panel once thyroid function tests have stabilized prior to re-initiation

## 2023-01-18 NOTE — PROGRESS NOTE ADULT - SUBJECTIVE AND OBJECTIVE BOX
PATIENT: MELO GRIGGS, MRN: [id number]    CHIEF COMPLAINT: Patient is a 52y old  Female who presents with a chief complaint of behavioral change; abnormal TFT (17 Jan 2023 14:04)      INTERVAL HISTORY/OVERNIGHT EVENTS: No overnight events. Patient's fatigue has improved. She has no other complaints at this time.    REVIEW OF SYSTEMS:    Constitutional:     [x] negative [ ] fevers [ ] chills  CV:                         [x] negative  [ ] chest pain [ ] palpitations  Resp:                     [x] negative [ ] cough [ ] shortness of breath  GI:                          [x] negative [ ] nausea [ ] vomiting [ ] diarrhea [ ] constipation [ ] abd pain    [ ] All other systems negative  [ ] Unable to assess ROS because ________.    MEDICATIONS:  MEDICATIONS  (STANDING):  dextrose 5%. 1000 milliLiter(s) (50 mL/Hr) IV Continuous <Continuous>  dextrose 5%. 1000 milliLiter(s) (100 mL/Hr) IV Continuous <Continuous>  dextrose 5%. 1000 milliLiter(s) (100 mL/Hr) IV Continuous <Continuous>  dextrose 50% Injectable 25 Gram(s) IV Push once  dextrose 50% Injectable 12.5 Gram(s) IV Push once  dextrose 50% Injectable 25 Gram(s) IV Push once  glucagon  Injectable 1 milliGRAM(s) IntraMuscular once  heparin   Injectable 5000 Unit(s) SubCutaneous every 8 hours  insulin lispro (ADMELOG) corrective regimen sliding scale   SubCutaneous three times a day before meals  insulin lispro (ADMELOG) corrective regimen sliding scale   SubCutaneous at bedtime  levothyroxine 125 MICROGram(s) Oral daily  risperiDONE   Tablet 0.5 milliGRAM(s) Oral <User Schedule>    MEDICATIONS  (PRN):  acetaminophen     Tablet .. 650 milliGRAM(s) Oral every 6 hours PRN Temp greater or equal to 38C (100.4F), Mild Pain (1 - 3)  dextrose Oral Gel 15 Gram(s) Oral once PRN Blood Glucose LESS THAN 70 milliGRAM(s)/deciliter  haloperidol    Injectable 2 milliGRAM(s) IV Push every 6 hours PRN Agitation  LORazepam   Injectable 2 milliGRAM(s) IntraMuscular every 6 hours PRN Agitation      ALLERGIES: Allergies    No Known Allergies      OBJECTIVE:  ICU Vital Signs Last 24 Hrs  T(C): 36.3 (18 Jan 2023 02:00), Max: 36.8 (17 Jan 2023 09:42)  T(F): 97.3 (18 Jan 2023 02:00), Max: 98.3 (17 Jan 2023 09:42)  HR: 84 (18 Jan 2023 02:00) (82 - 87)  BP: 137/88 (18 Jan 2023 02:00) (137/86 - 141/89)  BP(mean): --  ABP: --  ABP(mean): --  RR: 17 (18 Jan 2023 02:00) (16 - 20)  SpO2: 100% (18 Jan 2023 02:00) (100% - 100%)    O2 Parameters below as of 18 Jan 2023 02:00  Patient On (Oxygen Delivery Method): room air    CAPILLARY BLOOD GLUCOSE      POCT Blood Glucose.: 105 mg/dL (18 Jan 2023 06:58)  POCT Blood Glucose.: 144 mg/dL (17 Jan 2023 21:47)  POCT Blood Glucose.: 141 mg/dL (17 Jan 2023 17:09)  POCT Blood Glucose.: 106 mg/dL (17 Jan 2023 12:09)  POCT Blood Glucose.: 90 mg/dL (17 Jan 2023 07:27)    CAPILLARY BLOOD GLUCOSE      POCT Blood Glucose.: 105 mg/dL (18 Jan 2023 06:58)      PHYSICAL EXAMINATION:  General: Comfortable, no acute distress, cooperative with exam.  Respiratory: CTAB, normal respiratory effort  CV: RRR, S1S2, no murmurs, rubs or gallops  Abdominal: Soft, nontender, nondistended    LABS:                          11.7   6.41  )-----------( 296      ( 17 Jan 2023 06:00 )             39.4     01-17    149<H>  |  114<H>  |  17  ----------------------------<  83  4.2   |  26  |  1.61<H>    Ca    9.7      17 Jan 2023 06:00  Phos  3.4     01-17  Mg     2.10     01-17    TPro  6.6  /  Alb  3.9  /  TBili  <0.2  /  DBili  x   /  AST  27  /  ALT  22  /  AlkPhos  101  01-16    LIVER FUNCTIONS - ( 16 Jan 2023 09:14 )  Alb: 3.9 g/dL / Pro: 6.6 g/dL / ALK PHOS: 101 U/L / ALT: 22 U/L / AST: 27 U/L / GGT: x           EKG: Reviewed    IMAGING: Studies reviewed

## 2023-01-19 LAB
GLUCOSE BLDC GLUCOMTR-MCNC: 116 MG/DL — HIGH (ref 70–99)
GLUCOSE BLDC GLUCOMTR-MCNC: 154 MG/DL — HIGH (ref 70–99)
GLUCOSE BLDC GLUCOMTR-MCNC: 91 MG/DL — SIGNIFICANT CHANGE UP (ref 70–99)
GLUCOSE BLDC GLUCOMTR-MCNC: 91 MG/DL — SIGNIFICANT CHANGE UP (ref 70–99)
GLUCOSE BLDC GLUCOMTR-MCNC: 93 MG/DL — SIGNIFICANT CHANGE UP (ref 70–99)

## 2023-01-19 PROCEDURE — 99232 SBSQ HOSP IP/OBS MODERATE 35: CPT

## 2023-01-19 PROCEDURE — 99232 SBSQ HOSP IP/OBS MODERATE 35: CPT | Mod: GC

## 2023-01-19 RX ADMIN — Medication 125 MICROGRAM(S): at 05:50

## 2023-01-19 RX ADMIN — RISPERIDONE 1 MILLIGRAM(S): 4 TABLET ORAL at 13:10

## 2023-01-19 RX ADMIN — SODIUM CHLORIDE 100 MILLILITER(S): 9 INJECTION, SOLUTION INTRAVENOUS at 05:48

## 2023-01-19 RX ADMIN — HEPARIN SODIUM 5000 UNIT(S): 5000 INJECTION INTRAVENOUS; SUBCUTANEOUS at 21:52

## 2023-01-19 RX ADMIN — HEPARIN SODIUM 5000 UNIT(S): 5000 INJECTION INTRAVENOUS; SUBCUTANEOUS at 05:48

## 2023-01-19 RX ADMIN — HEPARIN SODIUM 5000 UNIT(S): 5000 INJECTION INTRAVENOUS; SUBCUTANEOUS at 13:11

## 2023-01-19 NOTE — PROGRESS NOTE ADULT - ASSESSMENT
52 F Hx T2DM on metformin, bipolar (on lithium, domiciled w/ mother), HLD, Hypothyroidism, adm for AMS w/ endocrinology consulted for question of Myxedema Coma. Complex patient high level decision making.    1) Severe Hypothyroidism   Pt on Levothyroxine 125mcg per surescripts recently filled but per patient has not been taking it for a long time; likely correlated given lithium level also undetectable. Will need later collateral for TFTs on stable dosing. Less likely 2/2 acute Lithium; possibly i/s/o  levothyroxine nonadherence. Overall patient w/ severe hypothyroidism possibly impending myxedema coma. Now more active.   - TSH: 410. T3 total: <30. Free Thyroxine: <0.3 TPO: 19.3 (normal). Cortisol: 9.6  - Repeat TSH on 1/15 - 370 and FT4 0.5 which is mildly improved.  - Physical exam noted for ams though hard to delineate from known psychiatric baseline disease, Mild facial swelling and thyroid enlargement w/ areas of hyperpigmentation around neck and legs. No overt signs of adrenal insufficiency given afebrile/normotension/normocardia.   Recommendation:   - Continue Levothyroxine 125 mcg po daily, Give in AM on empty stomach. Will need to ensure adherence after discharge.  Checked FREE T4 1/18 = 0.7 which is improving and closer to normal range. Recheck Free T4 if still inpatient on 1/24/23. Otherwise repeat TSH and Free T4 in 4-6 weeks as outpatient.  - Stim test performed : cortisol peaked at 33.8 - not indicative of adrenal insufficiency. Continue off hydrocortisone at this time.    2) T2DM  - On metformin 500 daily per surescripts  - A1c 5.5  -BG well controlled  -Ordered for low correction scales, not requiring  Given reduced GFR (43) and normal glucose can stop metformin at discharge.    3) HLD   - On rosuvastatin 10mg per surescripts   - would check lipid panel outpatient once thyroid levels have stabilized 2/2 associated dyslipidemia    Alvaro Jonas MD  Division of Endocrinology  Pager: [phone]    If after 6PM or before 9AM, or on weekends/holidays, please call endocrine answering service for assistance ([phone]).  For nonurgent matters email [email or web address] for assistance.

## 2023-01-19 NOTE — PROGRESS NOTE ADULT - SUBJECTIVE AND OBJECTIVE BOX
PATIENT: MELO GRIGGS, MRN: [id number]    CHIEF COMPLAINT: Patient is a 52y old  Female who presents with a chief complaint of behavioral change; abnormal TFT (18 Jan 2023 07:26)      INTERVAL HISTORY/OVERNIGHT EVENTS: No overnight events. Patient denies any health complaints, she understands her plan of care. Feels well, fatigue has improved.    REVIEW OF SYSTEMS:    Constitutional:     [x] negative [ ] fevers [ ] chills  CV:                         [x] negative  [ ] chest pain [ ] palpitations  Resp:                     [x] negative [ ] cough [ ] shortness of breath  GI:                          [x] negative [ ] nausea [ ] vomiting [ ] diarrhea [ ] constipation [ ] abd pain    [ ] All other systems negative  [ ] Unable to assess ROS because ________.    MEDICATIONS:  MEDICATIONS  (STANDING):  dextrose 5%. 1000 milliLiter(s) (50 mL/Hr) IV Continuous <Continuous>  dextrose 5%. 1000 milliLiter(s) (100 mL/Hr) IV Continuous <Continuous>  dextrose 5%. 1000 milliLiter(s) (100 mL/Hr) IV Continuous <Continuous>  dextrose 50% Injectable 25 Gram(s) IV Push once  dextrose 50% Injectable 12.5 Gram(s) IV Push once  dextrose 50% Injectable 25 Gram(s) IV Push once  glucagon  Injectable 1 milliGRAM(s) IntraMuscular once  heparin   Injectable 5000 Unit(s) SubCutaneous every 8 hours  insulin lispro (ADMELOG) corrective regimen sliding scale   SubCutaneous three times a day before meals  insulin lispro (ADMELOG) corrective regimen sliding scale   SubCutaneous at bedtime  levothyroxine 125 MICROGram(s) Oral daily  risperiDONE   Tablet 1 milliGRAM(s) Oral daily    MEDICATIONS  (PRN):  acetaminophen     Tablet .. 650 milliGRAM(s) Oral every 6 hours PRN Temp greater or equal to 38C (100.4F), Mild Pain (1 - 3)  dextrose Oral Gel 15 Gram(s) Oral once PRN Blood Glucose LESS THAN 70 milliGRAM(s)/deciliter  haloperidol    Injectable 2 milliGRAM(s) IV Push every 6 hours PRN Agitation  LORazepam   Injectable 2 milliGRAM(s) IntraMuscular every 6 hours PRN Agitation      ALLERGIES: Allergies    No Known Allergies      OBJECTIVE:  ICU Vital Signs Last 24 Hrs  T(C): 36.9 (19 Jan 2023 05:00), Max: 36.9 (18 Jan 2023 14:13)  T(F): 98.5 (19 Jan 2023 05:00), Max: 98.5 (19 Jan 2023 05:00)  HR: 81 (19 Jan 2023 05:00) (78 - 83)  BP: 118/86 (19 Jan 2023 05:00) (118/86 - 144/88)  BP(mean): --  ABP: --  ABP(mean): --  RR: 18 (19 Jan 2023 05:00) (17 - 18)  SpO2: 100% (19 Jan 2023 05:00) (100% - 100%)    O2 Parameters below as of 19 Jan 2023 05:00  Patient On (Oxygen Delivery Method): room air      CAPILLARY BLOOD GLUCOSE      POCT Blood Glucose.: 154 mg/dL (19 Jan 2023 06:33)  POCT Blood Glucose.: 110 mg/dL (18 Jan 2023 22:03)  POCT Blood Glucose.: 83 mg/dL (18 Jan 2023 17:17)    CAPILLARY BLOOD GLUCOSE      POCT Blood Glucose.: 154 mg/dL (19 Jan 2023 06:33)      PHYSICAL EXAMINATION:  General: Comfortable, no acute distress, cooperative with exam.  Respiratory: CTAB, normal respiratory effort  CV: RRR, S1S2, no murmurs, rubs or gallops.  Abdominal: Soft, nontender, nondistended      LABS:                          11.0   8.72  )-----------( 266      ( 18 Jan 2023 06:30 )             36.0     01-18    147<H>  |  112<H>  |  15  ----------------------------<  95  3.3<L>   |  23  |  1.46<H>    Ca    8.3<L>      18 Jan 2023 06:30  Phos  4.1     01-18  Mg     1.90     01-18      EKG: Reviewed    IMAGING: Studies reviewed

## 2023-01-19 NOTE — PROGRESS NOTE ADULT - PROBLEM SELECTOR PLAN 2
Reported to be on Synthroid 125 mcg q.d., likely also non-adherent with significant elevation in TSH. Initially presenting with some lethargy. Completed steroid taper (1/15) due to concern for adrenal insufficiency.  - Endocrinology recommendations appreciated  - Levothyroxine 125 mcg p.o. q.d.

## 2023-01-19 NOTE — PROGRESS NOTE ADULT - ATTENDING COMMENTS
52F with bipolar disorder, hypothyroidism, admitted for acute encephalopathy most likely in the setting of psychosis due to lithium nonadherence with course complicated by acute renal failure.  Patient is clinically stable   Check labs , lipid panel in am, f/w endo recs.   Patient needs inpatient rehab placement.

## 2023-01-19 NOTE — PROGRESS NOTE ADULT - SUBJECTIVE AND OBJECTIVE BOX
Chief Complaint: Hypothyroidism    History: tolerating po  denies complaints or concerns    MEDICATIONS  (STANDING):  dextrose 5%. 1000 milliLiter(s) (50 mL/Hr) IV Continuous <Continuous>  dextrose 5%. 1000 milliLiter(s) (100 mL/Hr) IV Continuous <Continuous>  dextrose 5%. 1000 milliLiter(s) (100 mL/Hr) IV Continuous <Continuous>  dextrose 50% Injectable 25 Gram(s) IV Push once  dextrose 50% Injectable 12.5 Gram(s) IV Push once  dextrose 50% Injectable 25 Gram(s) IV Push once  glucagon  Injectable 1 milliGRAM(s) IntraMuscular once  heparin   Injectable 5000 Unit(s) SubCutaneous every 8 hours  insulin lispro (ADMELOG) corrective regimen sliding scale   SubCutaneous three times a day before meals  insulin lispro (ADMELOG) corrective regimen sliding scale   SubCutaneous at bedtime  levothyroxine 125 MICROGram(s) Oral daily  risperiDONE   Tablet 1 milliGRAM(s) Oral daily    MEDICATIONS  (PRN):  acetaminophen     Tablet .. 650 milliGRAM(s) Oral every 6 hours PRN Temp greater or equal to 38C (100.4F), Mild Pain (1 - 3)  dextrose Oral Gel 15 Gram(s) Oral once PRN Blood Glucose LESS THAN 70 milliGRAM(s)/deciliter  haloperidol    Injectable 2 milliGRAM(s) IV Push every 6 hours PRN Agitation  LORazepam   Injectable 2 milliGRAM(s) IntraMuscular every 6 hours PRN Agitation      Allergies    No Known Allergies    Intolerances      Review of Systems:  ALL OTHER SYSTEMS REVIEWED AND NEGATIVE      PHYSICAL EXAM:  VITALS: T(C): 37 (01-19-23 @ 10:27)  T(F): 98.6 (01-19-23 @ 10:27), Max: 98.6 (01-19-23 @ 10:27)  HR: 90 (01-19-23 @ 10:27) (78 - 90)  BP: 111/88 (01-19-23 @ 10:27) (111/88 - 135/82)  RR:  (18 - 18)  SpO2:  (100% - 100%)  Wt(kg): --  GENERAL: NAD, well-groomed, well-developed  EYES: No proptosis, no lid lag, anicteric  HEENT:  Atraumatic, Normocephalic, moist mucous membranes  RESPIRATORY: nonlabored respirations, no wheezing  PSYCH: Alert and oriented x 3, flat affect    CAPILLARY BLOOD GLUCOSE      POCT Blood Glucose.: 91 mg/dL (19 Jan 2023 12:04)  POCT Blood Glucose.: 93 mg/dL (19 Jan 2023 07:48)  POCT Blood Glucose.: 154 mg/dL (19 Jan 2023 06:33)  POCT Blood Glucose.: 110 mg/dL (18 Jan 2023 22:03)  POCT Blood Glucose.: 83 mg/dL (18 Jan 2023 17:17)      01-18    147<H>  |  112<H>  |  15  ----------------------------<  95  3.3<L>   |  23  |  1.46<H>    eGFR: 43<L>    Ca    8.3<L>      01-18  Mg     1.90     01-18  Phos  4.1     01-18        A1C with Estimated Average Glucose Result: 5.5 % (01-12-23 @ 14:10)      Thyroid Function Tests:  01-18 @ 10:30 TSH -- FreeT4 0.7 T3 -- Anti TPO -- Anti Thyroglobulin Ab -- TSI --  01-15 @ 05:05 .10 FreeT4 0.5 T3 -- Anti TPO -- Anti Thyroglobulin Ab -- TSI --

## 2023-01-19 NOTE — PROGRESS NOTE ADULT - PROBLEM SELECTOR PLAN 4
Reportedly prescribed rosuvastatin 10 mg p.o. q.d.  - Will need outpatient lipid panel once thyroid function tests have stabilized prior to re-initiation Reportedly prescribed rosuvastatin 10 mg p.o. q.d.  - Will need outpatient lipid panel once thyroid function tests have stabilized prior to re-initiation  - F/u a.m. fasting lipid panel

## 2023-01-20 LAB
ANION GAP SERPL CALC-SCNC: 12 MMOL/L — SIGNIFICANT CHANGE UP (ref 7–14)
BUN SERPL-MCNC: 14 MG/DL — SIGNIFICANT CHANGE UP (ref 7–23)
CALCIUM SERPL-MCNC: 9.4 MG/DL — SIGNIFICANT CHANGE UP (ref 8.4–10.5)
CHLORIDE SERPL-SCNC: 107 MMOL/L — SIGNIFICANT CHANGE UP (ref 98–107)
CHOLEST SERPL-MCNC: 292 MG/DL — HIGH
CO2 SERPL-SCNC: 23 MMOL/L — SIGNIFICANT CHANGE UP (ref 22–31)
CREAT SERPL-MCNC: 1.55 MG/DL — HIGH (ref 0.5–1.3)
EGFR: 40 ML/MIN/1.73M2 — LOW
GLUCOSE BLDC GLUCOMTR-MCNC: 109 MG/DL — HIGH (ref 70–99)
GLUCOSE BLDC GLUCOMTR-MCNC: 110 MG/DL — HIGH (ref 70–99)
GLUCOSE BLDC GLUCOMTR-MCNC: 136 MG/DL — HIGH (ref 70–99)
GLUCOSE BLDC GLUCOMTR-MCNC: 89 MG/DL — SIGNIFICANT CHANGE UP (ref 70–99)
GLUCOSE SERPL-MCNC: 124 MG/DL — HIGH (ref 70–99)
HCT VFR BLD CALC: 41.5 % — SIGNIFICANT CHANGE UP (ref 34.5–45)
HDLC SERPL-MCNC: 70 MG/DL — SIGNIFICANT CHANGE UP
HGB BLD-MCNC: 12.4 G/DL — SIGNIFICANT CHANGE UP (ref 11.5–15.5)
LIPID PNL WITH DIRECT LDL SERPL: 168 MG/DL — HIGH
MAGNESIUM SERPL-MCNC: 2 MG/DL — SIGNIFICANT CHANGE UP (ref 1.6–2.6)
MCHC RBC-ENTMCNC: 27.3 PG — SIGNIFICANT CHANGE UP (ref 27–34)
MCHC RBC-ENTMCNC: 29.9 GM/DL — LOW (ref 32–36)
MCV RBC AUTO: 91.4 FL — SIGNIFICANT CHANGE UP (ref 80–100)
NON HDL CHOLESTEROL: 222 MG/DL — HIGH
NRBC # BLD: 0 /100 WBCS — SIGNIFICANT CHANGE UP (ref 0–0)
NRBC # FLD: 0 K/UL — SIGNIFICANT CHANGE UP (ref 0–0)
PHOSPHATE SERPL-MCNC: 3.1 MG/DL — SIGNIFICANT CHANGE UP (ref 2.5–4.5)
PLATELET # BLD AUTO: 293 K/UL — SIGNIFICANT CHANGE UP (ref 150–400)
POTASSIUM SERPL-MCNC: 4 MMOL/L — SIGNIFICANT CHANGE UP (ref 3.5–5.3)
POTASSIUM SERPL-SCNC: 4 MMOL/L — SIGNIFICANT CHANGE UP (ref 3.5–5.3)
RBC # BLD: 4.54 M/UL — SIGNIFICANT CHANGE UP (ref 3.8–5.2)
RBC # FLD: 13.7 % — SIGNIFICANT CHANGE UP (ref 10.3–14.5)
SODIUM SERPL-SCNC: 142 MMOL/L — SIGNIFICANT CHANGE UP (ref 135–145)
TRIGL SERPL-MCNC: 272 MG/DL — HIGH
WBC # BLD: 6.49 K/UL — SIGNIFICANT CHANGE UP (ref 3.8–10.5)
WBC # FLD AUTO: 6.49 K/UL — SIGNIFICANT CHANGE UP (ref 3.8–10.5)

## 2023-01-20 PROCEDURE — 99232 SBSQ HOSP IP/OBS MODERATE 35: CPT | Mod: GC

## 2023-01-20 RX ADMIN — HEPARIN SODIUM 5000 UNIT(S): 5000 INJECTION INTRAVENOUS; SUBCUTANEOUS at 05:22

## 2023-01-20 RX ADMIN — Medication 125 MICROGRAM(S): at 05:23

## 2023-01-20 RX ADMIN — HEPARIN SODIUM 5000 UNIT(S): 5000 INJECTION INTRAVENOUS; SUBCUTANEOUS at 21:59

## 2023-01-20 RX ADMIN — HEPARIN SODIUM 5000 UNIT(S): 5000 INJECTION INTRAVENOUS; SUBCUTANEOUS at 14:03

## 2023-01-20 RX ADMIN — RISPERIDONE 1 MILLIGRAM(S): 4 TABLET ORAL at 12:21

## 2023-01-20 NOTE — PROGRESS NOTE ADULT - PROBLEM SELECTOR PLAN 4
Reportedly prescribed rosuvastatin 10 mg p.o. q.d.  - Will need outpatient lipid panel once thyroid function tests have stabilized prior to re-initiation  - F/u a.m. fasting lipid panel

## 2023-01-20 NOTE — PROGRESS NOTE ADULT - SUBJECTIVE AND OBJECTIVE BOX
PATIENT: MELO GRIGGS, MRN: [id number]    CHIEF COMPLAINT: Patient is a 52y old  Female who presents with a chief complaint of behavioral change; abnormal TFT (19 Jan 2023 15:05)      INTERVAL HISTORY/OVERNIGHT EVENTS: No overnight events. No additional complaints, pending placement.    REVIEW OF SYSTEMS:    Constitutional:     [x] negative [ ] fevers [ ] chills  CV:                         [x] negative  [ ] chest pain [ ] palpitations  Resp:                     [x] negative [ ] cough [ ] shortness of breath  GI:                          [x] negative [ ] nausea [ ] diarrhea [ ] constipation [ ] abd pain      [ ] All other systems negative  [ ] Unable to assess ROS because ________.    MEDICATIONS:  MEDICATIONS  (STANDING):  dextrose 5%. 1000 milliLiter(s) (50 mL/Hr) IV Continuous <Continuous>  dextrose 5%. 1000 milliLiter(s) (100 mL/Hr) IV Continuous <Continuous>  dextrose 5%. 1000 milliLiter(s) (100 mL/Hr) IV Continuous <Continuous>  dextrose 50% Injectable 25 Gram(s) IV Push once  dextrose 50% Injectable 12.5 Gram(s) IV Push once  dextrose 50% Injectable 25 Gram(s) IV Push once  glucagon  Injectable 1 milliGRAM(s) IntraMuscular once  heparin   Injectable 5000 Unit(s) SubCutaneous every 8 hours  insulin lispro (ADMELOG) corrective regimen sliding scale   SubCutaneous three times a day before meals  insulin lispro (ADMELOG) corrective regimen sliding scale   SubCutaneous at bedtime  levothyroxine 125 MICROGram(s) Oral daily  risperiDONE   Tablet 1 milliGRAM(s) Oral daily    MEDICATIONS  (PRN):  acetaminophen     Tablet .. 650 milliGRAM(s) Oral every 6 hours PRN Temp greater or equal to 38C (100.4F), Mild Pain (1 - 3)  dextrose Oral Gel 15 Gram(s) Oral once PRN Blood Glucose LESS THAN 70 milliGRAM(s)/deciliter  haloperidol    Injectable 2 milliGRAM(s) IV Push every 6 hours PRN Agitation  LORazepam   Injectable 2 milliGRAM(s) IntraMuscular every 6 hours PRN Agitation      ALLERGIES: Allergies    No Known Allergies      OBJECTIVE:  ICU Vital Signs Last 24 Hrs  T(C): 36.8 (20 Jan 2023 04:54), Max: 37.1 (19 Jan 2023 22:46)  T(F): 98.3 (20 Jan 2023 04:54), Max: 98.7 (19 Jan 2023 22:46)  HR: 63 (20 Jan 2023 04:54) (62 - 90)  BP: 115/78 (20 Jan 2023 04:54) (111/88 - 153/62)  BP(mean): --  ABP: --  ABP(mean): --  RR: 18 (20 Jan 2023 04:54) (18 - 18)  SpO2: 99% (20 Jan 2023 04:54) (93% - 100%)    O2 Parameters below as of 20 Jan 2023 04:54  Patient On (Oxygen Delivery Method): room air      CAPILLARY BLOOD GLUCOSE      POCT Blood Glucose.: 116 mg/dL (19 Jan 2023 22:05)  POCT Blood Glucose.: 91 mg/dL (19 Jan 2023 17:32)  POCT Blood Glucose.: 91 mg/dL (19 Jan 2023 12:04)  POCT Blood Glucose.: 93 mg/dL (19 Jan 2023 07:48)    CAPILLARY BLOOD GLUCOSE      POCT Blood Glucose.: 116 mg/dL (19 Jan 2023 22:05)    PHYSICAL EXAMINATION:  General: Comfortable, no acute distress, cooperative with exam.  Respiratory: CTAB, normal respiratory effort  CV: RRR, S1S2, no murmurs, rubs or gallops.  Neurology: AOx2-3    LABS:                          12.4   6.49  )-----------( 293      ( 20 Jan 2023 05:20 )             41.5     01-20    142  |  107  |  14  ----------------------------<  124<H>  4.0   |  23  |  1.55<H>    Ca    9.4      20 Jan 2023 05:20  Phos  3.1     01-20  Mg     2.00     01-20      EKG: Reviewed    IMAGING: Studies reviewed

## 2023-01-20 NOTE — PROGRESS NOTE ADULT - ATTENDING COMMENTS
52F with bipolar disorder, hypothyroidism, admitted for acute encephalopathy most likely in the setting of psychosis due to lithium nonadherence with course complicated by acute renal failure.  Will continue IVF , patient eating full diet now, avoid nephrotoxics, Psych f/w recs.  Patient needs  placement.

## 2023-01-21 LAB
ALBUMIN SERPL ELPH-MCNC: 4.2 G/DL — SIGNIFICANT CHANGE UP (ref 3.3–5)
ALP SERPL-CCNC: 126 U/L — HIGH (ref 40–120)
ALT FLD-CCNC: 17 U/L — SIGNIFICANT CHANGE UP (ref 4–33)
ANION GAP SERPL CALC-SCNC: 11 MMOL/L — SIGNIFICANT CHANGE UP (ref 7–14)
AST SERPL-CCNC: 20 U/L — SIGNIFICANT CHANGE UP (ref 4–32)
BILIRUB SERPL-MCNC: 0.3 MG/DL — SIGNIFICANT CHANGE UP (ref 0.2–1.2)
BUN SERPL-MCNC: 18 MG/DL — SIGNIFICANT CHANGE UP (ref 7–23)
CALCIUM SERPL-MCNC: 9.4 MG/DL — SIGNIFICANT CHANGE UP (ref 8.4–10.5)
CHLORIDE SERPL-SCNC: 105 MMOL/L — SIGNIFICANT CHANGE UP (ref 98–107)
CO2 SERPL-SCNC: 22 MMOL/L — SIGNIFICANT CHANGE UP (ref 22–31)
CREAT SERPL-MCNC: 1.59 MG/DL — HIGH (ref 0.5–1.3)
EGFR: 39 ML/MIN/1.73M2 — LOW
GLUCOSE SERPL-MCNC: 101 MG/DL — HIGH (ref 70–99)
HCT VFR BLD CALC: 42.7 % — SIGNIFICANT CHANGE UP (ref 34.5–45)
HGB BLD-MCNC: 13 G/DL — SIGNIFICANT CHANGE UP (ref 11.5–15.5)
MAGNESIUM SERPL-MCNC: 2 MG/DL — SIGNIFICANT CHANGE UP (ref 1.6–2.6)
MCHC RBC-ENTMCNC: 27.5 PG — SIGNIFICANT CHANGE UP (ref 27–34)
MCHC RBC-ENTMCNC: 30.4 GM/DL — LOW (ref 32–36)
MCV RBC AUTO: 90.5 FL — SIGNIFICANT CHANGE UP (ref 80–100)
NRBC # BLD: 0 /100 WBCS — SIGNIFICANT CHANGE UP (ref 0–0)
NRBC # FLD: 0 K/UL — SIGNIFICANT CHANGE UP (ref 0–0)
PHOSPHATE SERPL-MCNC: 3.6 MG/DL — SIGNIFICANT CHANGE UP (ref 2.5–4.5)
PLATELET # BLD AUTO: 284 K/UL — SIGNIFICANT CHANGE UP (ref 150–400)
POTASSIUM SERPL-MCNC: 4.5 MMOL/L — SIGNIFICANT CHANGE UP (ref 3.5–5.3)
POTASSIUM SERPL-SCNC: 4.5 MMOL/L — SIGNIFICANT CHANGE UP (ref 3.5–5.3)
PROT SERPL-MCNC: 7.6 G/DL — SIGNIFICANT CHANGE UP (ref 6–8.3)
RBC # BLD: 4.72 M/UL — SIGNIFICANT CHANGE UP (ref 3.8–5.2)
RBC # FLD: 13.6 % — SIGNIFICANT CHANGE UP (ref 10.3–14.5)
SODIUM SERPL-SCNC: 138 MMOL/L — SIGNIFICANT CHANGE UP (ref 135–145)
WBC # BLD: 6.99 K/UL — SIGNIFICANT CHANGE UP (ref 3.8–10.5)
WBC # FLD AUTO: 6.99 K/UL — SIGNIFICANT CHANGE UP (ref 3.8–10.5)

## 2023-01-21 PROCEDURE — 99232 SBSQ HOSP IP/OBS MODERATE 35: CPT | Mod: GC

## 2023-01-21 RX ADMIN — RISPERIDONE 1 MILLIGRAM(S): 4 TABLET ORAL at 16:00

## 2023-01-21 RX ADMIN — HEPARIN SODIUM 5000 UNIT(S): 5000 INJECTION INTRAVENOUS; SUBCUTANEOUS at 15:58

## 2023-01-21 RX ADMIN — Medication 125 MICROGRAM(S): at 07:04

## 2023-01-21 NOTE — PROGRESS NOTE ADULT - ATTENDING COMMENTS
52F with bipolar disorder, hypothyroidism, admitted for acute encephalopathy most likely in the setting of psychosis due to lithium nonadherence with course complicated by acute renal failure.  Creatinine at baseline , around 1.5, patient is eating , DC IVF  F/w labs   Pending placement

## 2023-01-21 NOTE — PROGRESS NOTE ADULT - PROBLEM SELECTOR PLAN 3
Unclear baseline, but if this is indeed acute in nature, most likely pre-renal injury.  - Dextrose 5% 100 mL/h for 24 h  - BMP q.d.  - She is to discontinue metformin Unclear baseline, but if this is indeed acute in nature, most likely pre-renal injury.  - BMP q.d.  - She is to discontinue metformin

## 2023-01-21 NOTE — PROGRESS NOTE ADULT - SUBJECTIVE AND OBJECTIVE BOX
PATIENT: MELO GRIGGS, MRN: [id number]    CHIEF COMPLAINT: Patient is a 52y old  Female who presents with a chief complaint of behavioral change; abnormal TFT (20 Jan 2023 07:21)      INTERVAL HISTORY/OVERNIGHT EVENTS: No overnight events. Patient has no complaints at this time.    REVIEW OF SYSTEMS:      Constitutional:     [x] negative [ ] fevers [ ] chills  CV:                         [x] negative  [ ] chest pain [ ] palpitations  Resp:                     [x] negative [ ] cough [ ] shortness of breath  GI:                          [x] negative [ ] nausea [ ] diarrhea [ ] constipation [ ] abd pain    [ ] All other systems negative  [ ] Unable to assess ROS because ________.    MEDICATIONS:  MEDICATIONS  (STANDING):  heparin   Injectable 5000 Unit(s) SubCutaneous every 8 hours  levothyroxine 125 MICROGram(s) Oral daily  risperiDONE   Tablet 1 milliGRAM(s) Oral daily    MEDICATIONS  (PRN):  acetaminophen     Tablet .. 650 milliGRAM(s) Oral every 6 hours PRN Temp greater or equal to 38C (100.4F), Mild Pain (1 - 3)  haloperidol    Injectable 2 milliGRAM(s) IV Push every 6 hours PRN Agitation  LORazepam   Injectable 2 milliGRAM(s) IntraMuscular every 6 hours PRN Agitation      ALLERGIES: Allergies    No Known Allergies      OBJECTIVE:  ICU Vital Signs Last 24 Hrs  T(C): 36.4 (21 Jan 2023 05:26), Max: 37.1 (20 Jan 2023 23:39)  T(F): 97.5 (21 Jan 2023 05:26), Max: 98.7 (20 Jan 2023 23:39)  HR: 83 (21 Jan 2023 05:26) (78 - 86)  BP: 124/92 (21 Jan 2023 05:26) (112/80 - 135/88)  BP(mean): --  ABP: --  ABP(mean): --  RR: 18 (21 Jan 2023 05:26) (18 - 18)  SpO2: 98% (21 Jan 2023 05:26) (98% - 100%)    O2 Parameters below as of 21 Jan 2023 05:26  Patient On (Oxygen Delivery Method): room air      CAPILLARY BLOOD GLUCOSE      POCT Blood Glucose.: 110 mg/dL (20 Jan 2023 22:42)  POCT Blood Glucose.: 89 mg/dL (20 Jan 2023 17:14)  POCT Blood Glucose.: 109 mg/dL (20 Jan 2023 12:20)  POCT Blood Glucose.: 136 mg/dL (20 Jan 2023 07:45)    CAPILLARY BLOOD GLUCOSE      POCT Blood Glucose.: 110 mg/dL (20 Jan 2023 22:42)    PHYSICAL EXAMINATION:  General: Comfortable, no acute distress, cooperative with exam.  Respiratory: CTAB, normal respiratory effort  CV: RRR, S1S2, no murmurs, rubs or gallops.  Abdominal: Soft, nontender, nondistended    LABS:                          12.4   6.49  )-----------( 293      ( 20 Jan 2023 05:20 )             41.5     01-20    142  |  107  |  14  ----------------------------<  124<H>  4.0   |  23  |  1.55<H>    Ca    9.4      20 Jan 2023 05:20  Phos  3.1     01-20  Mg     2.00     01-20      EKG: Reviewed    IMAGING: Studies reviewed

## 2023-01-21 NOTE — PROGRESS NOTE ADULT - PROBLEM SELECTOR PLAN 4
Reportedly prescribed rosuvastatin 10 mg p.o. q.d.  - Will need outpatient lipid panel once thyroid function tests have stabilized prior to re-initiation  - F/u a.m. fasting lipid panel Reportedly prescribed rosuvastatin 10 mg p.o. q.d.  - Will need outpatient lipid panel once thyroid function tests have stabilized prior to re-initiation

## 2023-01-22 PROCEDURE — 99232 SBSQ HOSP IP/OBS MODERATE 35: CPT | Mod: GC

## 2023-01-22 RX ADMIN — HEPARIN SODIUM 5000 UNIT(S): 5000 INJECTION INTRAVENOUS; SUBCUTANEOUS at 13:32

## 2023-01-22 RX ADMIN — HEPARIN SODIUM 5000 UNIT(S): 5000 INJECTION INTRAVENOUS; SUBCUTANEOUS at 21:38

## 2023-01-22 RX ADMIN — RISPERIDONE 1 MILLIGRAM(S): 4 TABLET ORAL at 13:31

## 2023-01-22 RX ADMIN — Medication 125 MICROGRAM(S): at 05:08

## 2023-01-22 NOTE — PROGRESS NOTE ADULT - PROBLEM SELECTOR PLAN 3
Unclear baseline, but if this is indeed acute in nature, most likely pre-renal injury.  - BMP q.d.  - She is to discontinue metformin

## 2023-01-22 NOTE — PROGRESS NOTE ADULT - ATTENDING COMMENTS
52F with bipolar disorder, hypothyroidism, admitted for acute encephalopathy most likely in the setting of psychosis due to lithium nonadherence with course complicated by acute renal failure.  Patient creatinine now at baseline , patient eating , calm, pending placement.  will check labs in am , f/w psych recs.

## 2023-01-22 NOTE — PROGRESS NOTE ADULT - SUBJECTIVE AND OBJECTIVE BOX
Teresa Muñoz MD  PGY 3 Department of Internal Medicine      Patient is a 52y old  Female who presents with a chief complaint of behavioral change; abnormal TFT (21 Jan 2023 05:35)      SUBJECTIVE / OVERNIGHT EVENTS: Pt seen and examined. No acute overnight events.   Denies fevers, chills, CP/palpitations, SOB/cough, abdominal pain, N/V, constipation, or diarrhea.        MEDICATIONS  (STANDING):  heparin   Injectable 5000 Unit(s) SubCutaneous every 8 hours  levothyroxine 125 MICROGram(s) Oral daily  risperiDONE   Tablet 1 milliGRAM(s) Oral daily    MEDICATIONS  (PRN):  acetaminophen     Tablet .. 650 milliGRAM(s) Oral every 6 hours PRN Temp greater or equal to 38C (100.4F), Mild Pain (1 - 3)  haloperidol    Injectable 2 milliGRAM(s) IV Push every 6 hours PRN Agitation  LORazepam   Injectable 2 milliGRAM(s) IntraMuscular every 6 hours PRN Agitation      I&O's Summary      Vital Signs Last 24 Hrs  T(C): 36.3 (22 Jan 2023 05:13), Max: 36.6 (21 Jan 2023 12:40)  T(F): 97.4 (22 Jan 2023 05:13), Max: 97.9 (21 Jan 2023 12:40)  HR: 79 (22 Jan 2023 05:13) (79 - 86)  BP: 133/78 (22 Jan 2023 05:13) (104/64 - 133/78)  BP(mean): --  RR: 18 (22 Jan 2023 05:13) (17 - 18)  SpO2: 100% (22 Jan 2023 05:13) (95% - 100%)    Parameters below as of 22 Jan 2023 05:13  Patient On (Oxygen Delivery Method): room air        CAPILLARY BLOOD GLUCOSE          PHYSICAL EXAM:  GENERAL: NAD,   HEAD:  Atraumatic, Normocephalic  EYES: EOMI, PERRL, conjunctiva and sclera clear  NECK: No JVD  CHEST/LUNG: Clear to auscultation bilaterally; No wheeze  HEART: Regular rate and rhythm; No murmurs, rubs, or gallops  ABDOMEN: Soft, Nontender, Nondistended; Bowel sounds present  EXTREMITIES:  2+ Peripheral Pulses, No clubbing, cyanosis, or edema  PSYCH: AAOx3  NEUROLOGY: non-focal  SKIN: No rashes or lesions       LABS:                        13.0   6.99  )-----------( 284      ( 21 Jan 2023 06:00 )             42.7     Auto Eosinophil # x     / Auto Eosinophil % x     / Auto Neutrophil # x     / Auto Neutrophil % x     / BANDS % x        01-21    138  |  105  |  18  ----------------------------<  101<H>  4.5   |  22  |  1.59<H>    Ca    9.4      21 Jan 2023 06:00  Mg     2.00     01-21  Phos  3.6     01-21  TPro  7.6  /  Alb  4.2  /  TBili  0.3  /  DBili  x   /  AST  20  /  ALT  17  /  AlkPhos  126<H>  01-21

## 2023-01-23 LAB
ALBUMIN SERPL ELPH-MCNC: 4 G/DL — SIGNIFICANT CHANGE UP (ref 3.3–5)
ALP SERPL-CCNC: 111 U/L — SIGNIFICANT CHANGE UP (ref 40–120)
ALT FLD-CCNC: 16 U/L — SIGNIFICANT CHANGE UP (ref 4–33)
ANION GAP SERPL CALC-SCNC: 15 MMOL/L — HIGH (ref 7–14)
AST SERPL-CCNC: 18 U/L — SIGNIFICANT CHANGE UP (ref 4–32)
BILIRUB SERPL-MCNC: <0.2 MG/DL — SIGNIFICANT CHANGE UP (ref 0.2–1.2)
BUN SERPL-MCNC: 29 MG/DL — HIGH (ref 7–23)
CALCIUM SERPL-MCNC: 9.2 MG/DL — SIGNIFICANT CHANGE UP (ref 8.4–10.5)
CHLORIDE SERPL-SCNC: 107 MMOL/L — SIGNIFICANT CHANGE UP (ref 98–107)
CO2 SERPL-SCNC: 18 MMOL/L — LOW (ref 22–31)
CREAT SERPL-MCNC: 1.46 MG/DL — HIGH (ref 0.5–1.3)
EGFR: 43 ML/MIN/1.73M2 — LOW
GLUCOSE SERPL-MCNC: 85 MG/DL — SIGNIFICANT CHANGE UP (ref 70–99)
HCT VFR BLD CALC: 36.2 % — SIGNIFICANT CHANGE UP (ref 34.5–45)
HGB BLD-MCNC: 11.2 G/DL — LOW (ref 11.5–15.5)
MAGNESIUM SERPL-MCNC: 2.2 MG/DL — SIGNIFICANT CHANGE UP (ref 1.6–2.6)
MCHC RBC-ENTMCNC: 27.4 PG — SIGNIFICANT CHANGE UP (ref 27–34)
MCHC RBC-ENTMCNC: 30.9 GM/DL — LOW (ref 32–36)
MCV RBC AUTO: 88.5 FL — SIGNIFICANT CHANGE UP (ref 80–100)
NRBC # BLD: 0 /100 WBCS — SIGNIFICANT CHANGE UP (ref 0–0)
NRBC # FLD: 0 K/UL — SIGNIFICANT CHANGE UP (ref 0–0)
PHOSPHATE SERPL-MCNC: 4 MG/DL — SIGNIFICANT CHANGE UP (ref 2.5–4.5)
PLATELET # BLD AUTO: 267 K/UL — SIGNIFICANT CHANGE UP (ref 150–400)
POTASSIUM SERPL-MCNC: 4.5 MMOL/L — SIGNIFICANT CHANGE UP (ref 3.5–5.3)
POTASSIUM SERPL-SCNC: 4.5 MMOL/L — SIGNIFICANT CHANGE UP (ref 3.5–5.3)
PROT SERPL-MCNC: 7.3 G/DL — SIGNIFICANT CHANGE UP (ref 6–8.3)
RBC # BLD: 4.09 M/UL — SIGNIFICANT CHANGE UP (ref 3.8–5.2)
RBC # FLD: 13.2 % — SIGNIFICANT CHANGE UP (ref 10.3–14.5)
SODIUM SERPL-SCNC: 140 MMOL/L — SIGNIFICANT CHANGE UP (ref 135–145)
WBC # BLD: 6.08 K/UL — SIGNIFICANT CHANGE UP (ref 3.8–10.5)
WBC # FLD AUTO: 6.08 K/UL — SIGNIFICANT CHANGE UP (ref 3.8–10.5)

## 2023-01-23 PROCEDURE — 99232 SBSQ HOSP IP/OBS MODERATE 35: CPT

## 2023-01-23 PROCEDURE — 99232 SBSQ HOSP IP/OBS MODERATE 35: CPT | Mod: GC

## 2023-01-23 RX ADMIN — Medication 125 MICROGRAM(S): at 05:10

## 2023-01-23 RX ADMIN — HEPARIN SODIUM 5000 UNIT(S): 5000 INJECTION INTRAVENOUS; SUBCUTANEOUS at 05:10

## 2023-01-23 RX ADMIN — HEPARIN SODIUM 5000 UNIT(S): 5000 INJECTION INTRAVENOUS; SUBCUTANEOUS at 21:51

## 2023-01-23 RX ADMIN — RISPERIDONE 1 MILLIGRAM(S): 4 TABLET ORAL at 12:37

## 2023-01-23 NOTE — PROGRESS NOTE ADULT - ASSESSMENT
52 F Hx T2DM on metformin, bipolar (on lithium, domiciled w/ mother), HLD, Hypothyroidism, adm for AMS w/ endocrinology consulted for question of Myxedema Coma. Complex patient high level decision making.    1) Severe Hypothyroidism   Pt on Levothyroxine 125mcg per surescripts recently filled but per patient has not been taking it for a long time; likely correlated given lithium level also undetectable. Will need later collateral for TFTs on stable dosing. Less likely 2/2 acute Lithium; possibly i/s/o  levothyroxine nonadherence. Overall patient w/ severe hypothyroidism possibly impending myxedema coma. Now more active.   - TSH: 410. T3 total: <30. Free Thyroxine: <0.3 TPO: 19.3 (normal). Cortisol: 9.6  - Repeat TSH on 1/15 - 370 and FT4 0.5 which is mildly improved.  - Physical exam noted for ams though hard to delineate from known psychiatric baseline disease, Mild facial swelling and thyroid enlargement w/ areas of hyperpigmentation around neck and legs. No overt signs of adrenal insufficiency given afebrile/normotension/normocardia.   Recommendation:   - Continue Levothyroxine 125 mcg po daily, Give in AM on empty stomach. Will need to ensure adherence after discharge.  Checked FREE T4 1/18 = 0.7 which is improving and closer to normal range. Recheck Free T4 tomorrow 1/24/23. Otherwise repeat TSH and Free T4 in 4-6 weeks as outpatient.  - Stim test performed : cortisol peaked at 33.8 - not indicative of adrenal insufficiency. Continue off hydrocortisone at this time.    2) T2DM  - On metformin 500 daily per surescripts  - A1c 5.5  -BG well controlled  -Ordered for low correction scales, not requiring  Given reduced GFR (43) and normal glucose can stop metformin at discharge.    3) HLD   - On rosuvastatin 10mg per surescripts   - would check lipid panel outpatient once thyroid levels have stabilized 2/2 associated dyslipidemia    Alvaro Jonas MD  Division of Endocrinology  Pager: [phone]    If after 6PM or before 9AM, or on weekends/holidays, please call endocrine answering service for assistance ([phone]).  For nonurgent matters email [email or web address] for assistance.

## 2023-01-23 NOTE — DIETITIAN INITIAL EVALUATION ADULT - PERTINENT MEDS FT
MEDICATIONS  (STANDING):  heparin   Injectable 5000 Unit(s) SubCutaneous every 8 hours  levothyroxine 125 MICROGram(s) Oral daily  risperiDONE   Tablet 1 milliGRAM(s) Oral daily    MEDICATIONS  (PRN):  acetaminophen     Tablet .. 650 milliGRAM(s) Oral every 6 hours PRN Temp greater or equal to 38C (100.4F), Mild Pain (1 - 3)  haloperidol    Injectable 2 milliGRAM(s) IV Push every 6 hours PRN Agitation  LORazepam   Injectable 2 milliGRAM(s) IntraMuscular every 6 hours PRN Agitation

## 2023-01-23 NOTE — DIETITIAN INITIAL EVALUATION ADULT - ORAL INTAKE PTA/DIET HISTORY
[de-identified] : Follow up 5/4/22: Patient returns for follow up, this time complaining of low back pain. She continues to follow with Dr. Andrews for pain management and has been undergoing injections in her cervical sine with Dr. Vasquez. She reports severe pain of her lower back. She denies any weakness or numbness in the lower extremities.\par \par Follow up 4/13/22: having neck pain to the left shoulder, more recently over the past 3 weeks, down to the left thumb.  in PT.  pain level initially 8/10, now 4-5/10 on average.  tried duloxetine, but was allergic. no injections to date.  has pain management physician, Dr. Felipe Andrews, has appt for today.\par \par Follow up 11/3/21: Patient presents for follow up. Denies new neurologic symptoms. Here to review imaging. \par \par Initial 10/20/21: Ms. SCHMIDT is a very pleasant 53 year old female who complains of chronic neck pain, worse over the past 2 months, onset while sleeping. Patient states pain originates in left shoulder/trapezius and radiates to the left side of her neck. Pain on onset was 8/10 severity, now 5/10 severity. Denies pain that radiates to upper extremities. Denies upper extremity numbness/weakness/paresthesias. Saw pain management and neurology.\par \par The patient no history of previous spine surgery.\par \par The patient has no history of unexpected weight loss, no history of active cancer, no history bladder or bowel dysfunction, no night pain, no fevers or chills.\par \par The past medical history, surgical history, family history, allergies, medications, 10+ point review of systems, family history and social history were reviewed and non contributory.\par \par 
Patient seen for assessment. Reports no issues w/ appetite PTA. Noted w/ A1c 5.5% (1/12) per chart.

## 2023-01-23 NOTE — PROGRESS NOTE ADULT - SUBJECTIVE AND OBJECTIVE BOX
Chief Complaint: Hypothyroidism    History: tolerating po  has 6 cups of coffee/tea at bedside  denies complaints  is ambulating    MEDICATIONS  (STANDING):  heparin   Injectable 5000 Unit(s) SubCutaneous every 8 hours  levothyroxine 125 MICROGram(s) Oral daily  risperiDONE   Tablet 1 milliGRAM(s) Oral daily    MEDICATIONS  (PRN):  acetaminophen     Tablet .. 650 milliGRAM(s) Oral every 6 hours PRN Temp greater or equal to 38C (100.4F), Mild Pain (1 - 3)  haloperidol    Injectable 2 milliGRAM(s) IV Push every 6 hours PRN Agitation  LORazepam   Injectable 2 milliGRAM(s) IntraMuscular every 6 hours PRN Agitation      Allergies    No Known Allergies    Intolerances      Review of Systems:  ALL OTHER SYSTEMS REVIEWED AND NEGATIVE      PHYSICAL EXAM:  VITALS: T(C): 36.6 (01-23-23 @ 12:35)  T(F): 97.9 (01-23-23 @ 12:35), Max: 97.9 (01-23-23 @ 12:35)  HR: 79 (01-23-23 @ 12:35) (77 - 100)  BP: 134/91 (01-23-23 @ 12:35) (126/80 - 137/89)  RR:  (16 - 18)  SpO2:  (95% - 100%)  Wt(kg): --  GENERAL: NAD, well-groomed, well-developed  EYES: No proptosis, no lid lag, anicteric  HEENT:  Atraumatic, Normocephalic, moist mucous membranes  RESPIRATORY: nonlabored respirations, no wheezing  PSYCH: Alert and oriented x 3, normal affect, normal mood    CAPILLARY BLOOD GLUCOSE          01-23    140  |  107  |  29<H>  ----------------------------<  85  4.5   |  18<L>  |  1.46<H>    eGFR: 43<L>    Ca    9.2      01-23  Mg     2.20     01-23  Phos  4.0     01-23    TPro  7.3  /  Alb  4.0  /  TBili  <0.2  /  DBili  x   /  AST  18  /  ALT  16  /  AlkPhos  111  01-23      A1C with Estimated Average Glucose Result: 5.5 % (01-12-23 @ 14:10)      Thyroid Function Tests:  01-18 @ 10:30 TSH -- FreeT4 0.7 T3 -- Anti TPO -- Anti Thyroglobulin Ab -- TSI --  01-15 @ 05:05 .10 FreeT4 0.5 T3 -- Anti TPO -- Anti Thyroglobulin Ab -- TSI --

## 2023-01-23 NOTE — PROGRESS NOTE ADULT - SUBJECTIVE AND OBJECTIVE BOX
Progress Note    MELO GRIGGS 52y (1970) Female [id number]  01-12-23 (11d)    Chief Complaint: behavioral change; abnormal TFT    Subjective:  No acute events overnight. Patient seen and examined at bedside.    Review of Systems:  REVIEW OF SYSTEMS:  CONSTITUTIONAL: No weakness, fevers or chills  EYES/ENT: No visual changes;  No vertigo or throat pain   NECK: No pain or stiffness  RESPIRATORY: No cough, wheezing, hemoptysis; No shortness of breath  CARDIOVASCULAR: No chest pain or palpitations  GASTROINTESTINAL: No abdominal or epigastric pain. No nausea, vomiting, or hematemesis; No diarrhea or constipation. No melena or hematochezia.  GENITOURINARY: No dysuria, frequency or hematuria  NEUROLOGICAL: No numbness or weakness  SKIN: No itching, rashes      PAST MEDICAL & SURGICAL HISTORY:  Bipolar disorder [F31.9]    No significant past surgical history [[id number]]      acetaminophen     Tablet .. 650 milliGRAM(s) Oral every 6 hours PRN  haloperidol    Injectable 2 milliGRAM(s) IV Push every 6 hours PRN  heparin   Injectable 5000 Unit(s) SubCutaneous every 8 hours  levothyroxine 125 MICROGram(s) Oral daily  LORazepam   Injectable 2 milliGRAM(s) IntraMuscular every 6 hours PRN  risperiDONE   Tablet 1 milliGRAM(s) Oral daily    Objective:  T(C): 36.2 (01-23-23 @ 05:57), Max: 36.6 (01-22-23 @ 12:55)  HR: 77 (01-23-23 @ 05:57) (77 - 100)  BP: 126/80 (01-23-23 @ 05:57) (122/84 - 137/89)  RR: 18 (01-23-23 @ 05:57) (16 - 18)  SpO2: 100% (01-23-23 @ 05:57) (100% - 100%)    Physical exam:  GENERAL: NAD, well-developed  HEAD:  Atraumatic, Normocephalic  EYES: EOMI, PERRLA, conjunctiva and sclera clear  NECK: Supple, No JVD  CHEST/LUNG: Clear to auscultation bilaterally; No wheeze  HEART: Regular rate and rhythm; No murmurs, rubs, or gallops  ABDOMEN: Soft, Nontender, Nondistended; Bowel sounds present  EXTREMITIES:  2+ Peripheral Pulses, No clubbing, cyanosis, or edema  PSYCH: AAOx3  NEUROLOGY: non-focal  SKIN: No rashes or lesions        CAPILLARY BLOOD GLUCOSE                    RVP:          Tox:             WBC Trend: 6.99<--, 6.49<--, 8.72<--    Hb Trend: 13.0<--, 12.4<--, 11.0<--, 11.7<--        New imaging in last 24 hours:  Consult notes reviewed:

## 2023-01-23 NOTE — DIETITIAN INITIAL EVALUATION ADULT - OTHER INFO
52 year old female with a PMH of DM, bipolar disorder, hypothyroidism, admitted for acute encephalopathy most likely in the setting of psychosis due to lithium nonadherence with course complicated by acute renal failure per chart.    Patient reporting good appetite. Noted w/ some intakes 0-100% per RN flow sheet. Patient w/ regular sugar packets and a donut from [employer] at bed side. Educated patient she needs to monitor consumption of simple sugars, which patient understood and threw items out. No GI distress or chewing/swallowing difficulties reported. Has no food allergies. States UBW is around 130-131 lbs. ABW is 134.4 lbs. (1/18) and 131 lbs. (1/13) per chart, ?accuracy of weights as it may be due to scale error, will monitor. No edema or pressure injuries noted per RN flow sheet.

## 2023-01-23 NOTE — DIETITIAN INITIAL EVALUATION ADULT - PERTINENT LABORATORY DATA
01-23    140  |  107  |  29<H>  ----------------------------<  85  4.5   |  18<L>  |  1.46<H>    Ca    9.2      23 Jan 2023 06:41  Phos  4.0     01-23  Mg     2.20     01-23    TPro  7.3  /  Alb  4.0  /  TBili  <0.2  /  DBili  x   /  AST  18  /  ALT  16  /  AlkPhos  111  01-23  A1C with Estimated Average Glucose Result: 5.5 % (01-12-23 @ 14:10)

## 2023-01-23 NOTE — PROGRESS NOTE ADULT - ATTENDING COMMENTS
52F with bipolar disorder, hypothyroidism, admitted for acute encephalopathy most likely in the setting of psychosis due to lithium nonadherence with course complicated by acute renal failure.  .  LABS:                         11.2   6.08  )-----------( 267      ( 23 Jan 2023 06:41 )             36.2     01-23    140  |  107  |  29<H>  ----------------------------<  85  4.5   |  18<L>  |  1.46<H>    Ca    9.2      23 Jan 2023 06:41  Phos  4.0     01-23  Mg     2.20     01-23    TPro  7.3  /  Alb  4.0  /  TBili  <0.2  /  DBili  x   /  AST  18  /  ALT  16  /  AlkPhos  111  01-23                  RADIOLOGY, EKG & ADDITIONAL TESTS: Reviewed.     Labs at baseline , patient is stable for placement , Group Home , will f/w psych to eval for developmental delay / mental retardation.  Endo recs follow up.

## 2023-01-24 DIAGNOSIS — F31.9 BIPOLAR DISORDER, UNSPECIFIED: ICD-10-CM

## 2023-01-24 DIAGNOSIS — R73.09 OTHER ABNORMAL GLUCOSE: ICD-10-CM

## 2023-01-24 DIAGNOSIS — R79.89 OTHER SPECIFIED ABNORMAL FINDINGS OF BLOOD CHEMISTRY: ICD-10-CM

## 2023-01-24 LAB — T4 FREE SERPL-MCNC: 0.9 NG/DL — SIGNIFICANT CHANGE UP (ref 0.9–1.8)

## 2023-01-24 PROCEDURE — 99232 SBSQ HOSP IP/OBS MODERATE 35: CPT

## 2023-01-24 PROCEDURE — 99232 SBSQ HOSP IP/OBS MODERATE 35: CPT | Mod: GC

## 2023-01-24 RX ADMIN — HEPARIN SODIUM 5000 UNIT(S): 5000 INJECTION INTRAVENOUS; SUBCUTANEOUS at 05:15

## 2023-01-24 RX ADMIN — RISPERIDONE 1 MILLIGRAM(S): 4 TABLET ORAL at 13:13

## 2023-01-24 RX ADMIN — HEPARIN SODIUM 5000 UNIT(S): 5000 INJECTION INTRAVENOUS; SUBCUTANEOUS at 13:13

## 2023-01-24 RX ADMIN — Medication 125 MICROGRAM(S): at 05:14

## 2023-01-24 NOTE — PROGRESS NOTE ADULT - SUBJECTIVE AND OBJECTIVE BOX
PATIENT: MELO GRIGGS, MRN: [id number]    CHIEF COMPLAINT: Patient is a 52y old  Female who presents with a chief complaint of Other symptom or sign involving appearance or behavior     (23 Jan 2023 16:43)      INTERVAL HISTORY/OVERNIGHT EVENTS: No overnight events. Patient has no complaints at this time, pending placement.    REVIEW OF SYSTEMS:    Constitutional:     [x] negative [ ] fevers [ ] chills  CV:                         [x] negative  [ ] chest pain [ ] palpitations  Resp:                     [x] negative [ ] cough [ ] shortness of breath  GI:                          [x] negative [ ] nausea [ ] vomiting [ ] diarrhea [ ] constipation [ ] abd pain    [ ] All other systems negative  [ ] Unable to assess ROS because ________.    MEDICATIONS:  MEDICATIONS  (STANDING):  heparin   Injectable 5000 Unit(s) SubCutaneous every 8 hours  levothyroxine 125 MICROGram(s) Oral daily  risperiDONE   Tablet 1 milliGRAM(s) Oral daily    MEDICATIONS  (PRN):  acetaminophen     Tablet .. 650 milliGRAM(s) Oral every 6 hours PRN Temp greater or equal to 38C (100.4F), Mild Pain (1 - 3)  haloperidol    Injectable 2 milliGRAM(s) IV Push every 6 hours PRN Agitation  LORazepam   Injectable 2 milliGRAM(s) IntraMuscular every 6 hours PRN Agitation      ALLERGIES: Allergies    No Known Allergies    OBJECTIVE:  ICU Vital Signs Last 24 Hrs  T(C): 36.2 (24 Jan 2023 05:17), Max: 36.6 (23 Jan 2023 12:35)  T(F): 97.2 (24 Jan 2023 05:17), Max: 97.9 (23 Jan 2023 12:35)  HR: 83 (24 Jan 2023 05:17) (76 - 83)  BP: 144/90 (24 Jan 2023 05:17) (134/91 - 151/87)  BP(mean): --  ABP: --  ABP(mean): --  RR: 17 (24 Jan 2023 05:17) (17 - 18)  SpO2: 100% (24 Jan 2023 05:17) (95% - 100%)    O2 Parameters below as of 24 Jan 2023 05:17  Patient On (Oxygen Delivery Method): room air      PHYSICAL EXAMINATION:  General: Comfortable, no acute distress, cooperative with exam.  Respiratory: CTAB, normal respiratory effort  CV: RRR, S1S2, no murmurs, rubs or gallops.  Abdominal: Soft, nontender, nondistended    LABS:                          11.2   6.08  )-----------( 267      ( 23 Jan 2023 06:41 )             36.2     01-23    140  |  107  |  29<H>  ----------------------------<  85  4.5   |  18<L>  |  1.46<H>    Ca    9.2      23 Jan 2023 06:41  Phos  4.0     01-23  Mg     2.20     01-23    TPro  7.3  /  Alb  4.0  /  TBili  <0.2  /  DBili  x   /  AST  18  /  ALT  16  /  AlkPhos  111  01-23    LIVER FUNCTIONS - ( 23 Jan 2023 06:41 )  Alb: 4.0 g/dL / Pro: 7.3 g/dL / ALK PHOS: 111 U/L / ALT: 16 U/L / AST: 18 U/L / GGT: x           EKG: Reviewed    IMAGING: Studies reviewed

## 2023-01-24 NOTE — PROGRESS NOTE ADULT - PROBLEM SELECTOR PLAN 3
Unclear baseline, but if this is indeed acute in nature, most likely pre-renal injury.  - BMP q.d.  - She is to discontinue metformin Unclear baseline, but if this is indeed acute in nature, most likely pre-renal injury.  - BMP q.w.  - She is to discontinue metformin

## 2023-01-24 NOTE — PROGRESS NOTE ADULT - ATTENDING COMMENTS
52F with bipolar disorder, hypothyroidism, admitted for acute encephalopathy most likely in the setting of psychosis due to lithium nonadherence with course complicated by acute renal failure.  Patient is clinically stable, f/w endocrinology recs.   DC planning complex .

## 2023-01-24 NOTE — PROGRESS NOTE ADULT - PROBLEM SELECTOR PLAN 1
She has bipolar disorder on lithium but unclear adherence prior to admission as serum levels low.  - Psychiatry following, recommendations appreciated  - Risperidone 1 mg p.o. q.h.s.  - Haldol 2 mg IV q.6.h. p.r.n. agitation/aggression

## 2023-01-24 NOTE — PROGRESS NOTE ADULT - PROBLEM SELECTOR PLAN 2
Reported to be on Synthroid 125 mcg q.d., likely also non-adherent with significant elevation in TSH. Initially presenting with some lethargy. Completed steroid taper (1/15) due to concern for adrenal insufficiency.  - Endocrinology recommendations appreciated  - Levothyroxine 125 mcg p.o. q.d. Reported to be on Synthroid 125 mcg q.d., likely also non-adherent with significant elevation in TSH. Initially presenting with some lethargy. Completed steroid taper (1/15) due to concern for adrenal insufficiency.  - Endocrinology recommendations appreciated  - Levothyroxine 125 mcg p.o. q.d.  - F/u fT4 in a.m.  - Will require TSH and fT4 measurement in 4-6 weeks Reported to be on Synthroid 125 mcg q.d., likely also non-adherent with significant elevation in TSH. Initially presenting with some lethargy. Completed steroid taper (1/15) due to concern for adrenal insufficiency.  - Endocrinology recommendations appreciated  - Levothyroxine 125 mcg p.o. q.d.  - Morning fT4 0.9, likely can maintain this dose  - Will require TSH and fT4 measurement in 4-6 weeks

## 2023-01-24 NOTE — PROGRESS NOTE ADULT - SUBJECTIVE AND OBJECTIVE BOX
Chief Complaint: Hypothyroidism    History: tolerating po  answers "yeah" repeatedly  no new events noted    MEDICATIONS  (STANDING):  heparin   Injectable 5000 Unit(s) SubCutaneous every 8 hours  levothyroxine 125 MICROGram(s) Oral daily  risperiDONE   Tablet 1 milliGRAM(s) Oral daily    MEDICATIONS  (PRN):  acetaminophen     Tablet .. 650 milliGRAM(s) Oral every 6 hours PRN Temp greater or equal to 38C (100.4F), Mild Pain (1 - 3)  haloperidol    Injectable 2 milliGRAM(s) IV Push every 6 hours PRN Agitation  LORazepam   Injectable 2 milliGRAM(s) IntraMuscular every 6 hours PRN Agitation      Allergies    No Known Allergies    Intolerances      Review of Systems:  ALL OTHER SYSTEMS REVIEWED AND NEGATIVE      PHYSICAL EXAM:  VITALS: T(C): 36.2 (01-24-23 @ 12:58)  T(F): 97.2 (01-24-23 @ 12:58), Max: 97.2 (01-24-23 @ 05:17)  HR: 71 (01-24-23 @ 12:58) (71 - 83)  BP: 151/93 (01-24-23 @ 12:58) (144/90 - 151/93)  RR:  (17 - 18)  SpO2:  (100% - 100%)  Wt(kg): --  GENERAL: NAD, well-groomed, well-developed  EYES: No proptosis, no lid lag, anicteric  HEENT:  Atraumatic, Normocephalic, moist mucous membranes  RESPIRATORY: nonlabored respirations, no wheezing  GI: Soft, nontender, non distended      CAPILLARY BLOOD GLUCOSE          01-23    140  |  107  |  29<H>  ----------------------------<  85  4.5   |  18<L>  |  1.46<H>    eGFR: 43<L>    Ca    9.2      01-23  Mg     2.20     01-23  Phos  4.0     01-23    TPro  7.3  /  Alb  4.0  /  TBili  <0.2  /  DBili  x   /  AST  18  /  ALT  16  /  AlkPhos  111  01-23      A1C with Estimated Average Glucose Result: 5.5 % (01-12-23 @ 14:10)      Thyroid Function Tests:  01-24 @ 07:25 TSH -- FreeT4 0.9 T3 -- Anti TPO -- Anti Thyroglobulin Ab -- TSI --  01-18 @ 10:30 TSH -- FreeT4 0.7 T3 -- Anti TPO -- Anti Thyroglobulin Ab -- TSI --

## 2023-01-24 NOTE — PROGRESS NOTE ADULT - ASSESSMENT
52 F Hx T2DM on metformin, bipolar (on lithium, domiciled w/ mother), HLD, Hypothyroidism, adm for AMS w/ endocrinology consulted for question of Myxedema Coma. Complex patient high level decision making.    1) Severe Hypothyroidism   Pt on Levothyroxine 125mcg PO per surescripts recently filled but per patient has not been taking it for a long time; likely correlated given lithium level also undetectable. Will need later collateral for TFTs on stable dosing. Less likely 2/2 acute Lithium; possibly i/s/o  levothyroxine nonadherence. Overall patient w/ severe hypothyroidism possibly impending myxedema coma. Now more active.   - TSH: 410. T3 total: <30. Free Thyroxine: <0.3 TPO: 19.3 (normal). Cortisol: 9.6  - Repeat TSH on 1/15 - 370 and FT4 0.5 which is mildly improved.    Recommendation:   - Continue Levothyroxine 125 mcg po daily, Give in AM on empty stomach. Will need to ensure adherence after discharge.  Rechecked Free T4 today 1/24/23 - now 0.9 improved into normal range on same as prior home dose, indicating that lack of adherence was the reason for the abnormal thyroid levels on admission.   Continue with current levothyroxine 125 mcg po daily.  Can repeat TSH and Free T4 in 4-6 weeks as outpatient.  - Stim test performed : cortisol peaked at 33.8 - not indicative of adrenal insufficiency. Continue off hydrocortisone at this time.    2) T2DM  - On metformin 500 daily per surescripts  - A1c 5.5  -BG well controlled  -Ordered for low correction scales, not requiring  Given reduced GFR (43) and normal glucose can stop metformin at discharge.    3) HLD   - On rosuvastatin 10mg per surescripts   - would check lipid panel outpatient once thyroid levels have stabilized 2/2 associated dyslipidemia    Will sign off please call if any questions.  Discussed with primary team.    Alvaro Jonas MD  Division of Endocrinology  Pager: [phone]    If after 6PM or before 9AM, or on weekends/holidays, please call endocrine answering service for assistance ([phone]).  For nonurgent matters email [email or web address] for assistance.

## 2023-01-25 PROCEDURE — 99231 SBSQ HOSP IP/OBS SF/LOW 25: CPT

## 2023-01-25 PROCEDURE — 99232 SBSQ HOSP IP/OBS MODERATE 35: CPT | Mod: GC

## 2023-01-25 RX ORDER — RISPERIDONE 4 MG/1
0.5 TABLET ORAL ONCE
Refills: 0 | Status: COMPLETED | OUTPATIENT
Start: 2023-01-25 | End: 2023-01-25

## 2023-01-25 RX ADMIN — HEPARIN SODIUM 5000 UNIT(S): 5000 INJECTION INTRAVENOUS; SUBCUTANEOUS at 13:05

## 2023-01-25 RX ADMIN — RISPERIDONE 1 MILLIGRAM(S): 4 TABLET ORAL at 13:05

## 2023-01-25 RX ADMIN — Medication 125 MICROGRAM(S): at 05:34

## 2023-01-25 NOTE — PROGRESS NOTE ADULT - PROBLEM SELECTOR PLAN 2
Reported to be on Synthroid 125 mcg q.d., likely also non-adherent with significant elevation in TSH. Initially presenting with some lethargy. Completed steroid taper (1/15) due to concern for adrenal insufficiency.  - Endocrinology recommendations appreciated  - Levothyroxine 125 mcg p.o. q.d.  - Will require TSH and fT4 measurement in 4-6 weeks

## 2023-01-25 NOTE — PROGRESS NOTE ADULT - PROBLEM SELECTOR PLAN 3
Unclear baseline, but if this is indeed acute in nature, most likely pre-renal injury.  - BMP q.w.  - She is to discontinue metformin

## 2023-01-25 NOTE — PROGRESS NOTE ADULT - ATTENDING COMMENTS
52F with bipolar disorder, hypothyroidism, admitted for acute encephalopathy most likely in the setting of psychosis due to lithium nonadherence with course complicated by acute renal failure.  Patient is followed  by Psych , give Risperidone STAT today.   SW complex case placement.

## 2023-01-25 NOTE — PROGRESS NOTE ADULT - SUBJECTIVE AND OBJECTIVE BOX
PATIENT: MELO GRIGGS, MRN: [id number]    CHIEF COMPLAINT: Patient is a 52y old  Female who presents with a chief complaint of behavioral change; abnormal TFT (24 Jan 2023 14:11)      INTERVAL HISTORY/OVERNIGHT EVENTS: No overnight events. Patient has no complaints at this time.    REVIEW OF SYSTEMS:    Constitutional:     [x] negative [ ] fevers [ ] chills  CV:                         [x] negative  [ ] chest pain [ ] palpitations  Resp:                     [x] negative [ ] cough [ ] shortness of breath  GI:                          [x] negative [ ] nausea [ ] vomiting [ ] diarrhea [ ] constipation [ ] abd pain    [ ] All other systems negative  [ ] Unable to assess ROS because ________.    MEDICATIONS:  MEDICATIONS  (STANDING):  heparin   Injectable 5000 Unit(s) SubCutaneous every 8 hours  levothyroxine 125 MICROGram(s) Oral daily  risperiDONE   Tablet 1 milliGRAM(s) Oral daily    MEDICATIONS  (PRN):  acetaminophen     Tablet .. 650 milliGRAM(s) Oral every 6 hours PRN Temp greater or equal to 38C (100.4F), Mild Pain (1 - 3)  haloperidol    Injectable 2 milliGRAM(s) IV Push every 6 hours PRN Agitation  LORazepam   Injectable 2 milliGRAM(s) IntraMuscular every 6 hours PRN Agitation      ALLERGIES: Allergies    No Known Allergies      OBJECTIVE:  ICU Vital Signs Last 24 Hrs  T(C): 36.8 (25 Jan 2023 05:45), Max: 36.8 (25 Jan 2023 05:45)  T(F): 98.2 (25 Jan 2023 05:45), Max: 98.2 (25 Jan 2023 05:45)  HR: 78 (25 Jan 2023 05:45) (71 - 81)  BP: 138/85 (25 Jan 2023 05:45) (138/85 - 151/93)  BP(mean): --  ABP: --  ABP(mean): --  RR: 18 (25 Jan 2023 05:45) (18 - 18)  SpO2: 99% (25 Jan 2023 05:45) (98% - 100%)    O2 Parameters below as of 25 Jan 2023 05:45  Patient On (Oxygen Delivery Method): room air       PHYSICAL EXAMINATION:  General: Comfortable, no acute distress, cooperative with exam.  Respiratory: CTAB, normal respiratory effort  CV: RRR, S1S2, no murmurs, rubs or gallops.  Abdominal: Soft, nontender, nondistended

## 2023-01-26 PROCEDURE — 99232 SBSQ HOSP IP/OBS MODERATE 35: CPT | Mod: GC

## 2023-01-26 RX ORDER — RISPERIDONE 4 MG/1
0.5 TABLET ORAL
Refills: 0 | Status: DISCONTINUED | OUTPATIENT
Start: 2023-01-26 | End: 2023-01-30

## 2023-01-26 RX ORDER — RISPERIDONE 4 MG/1
1 TABLET ORAL
Refills: 0 | Status: DISCONTINUED | OUTPATIENT
Start: 2023-01-26 | End: 2023-03-10

## 2023-01-26 RX ADMIN — HEPARIN SODIUM 5000 UNIT(S): 5000 INJECTION INTRAVENOUS; SUBCUTANEOUS at 19:45

## 2023-01-26 RX ADMIN — RISPERIDONE 0.5 MILLIGRAM(S): 4 TABLET ORAL at 12:38

## 2023-01-26 RX ADMIN — RISPERIDONE 1 MILLIGRAM(S): 4 TABLET ORAL at 19:45

## 2023-01-26 RX ADMIN — Medication 125 MICROGRAM(S): at 05:17

## 2023-01-26 NOTE — PROGRESS NOTE ADULT - PROBLEM SELECTOR PLAN 1
She has bipolar disorder on lithium but unclear adherence prior to admission as serum levels low.  - Psychiatry following, recommendations appreciated  - Risperidone 0.5 mg p.o. at 7 a.m. and 1 mg p.o. at 8 p.m.  - Haldol 2 mg IV/IM/p.o. q.6.h. p.r.n. agitation/aggression She has bipolar disorder on lithium but unclear adherence prior to admission as serum levels low.  - Psychiatry following, recommendations appreciated  - Risperidone 0.5 mg p.o. at 7 a.m. and 1 mg p.o. at 8 p.m.  - Haldol 2 mg IV/IM/p.o. q.6.h. p.r.n. agitation/aggression  - EKG q.48.h.

## 2023-01-26 NOTE — PROGRESS NOTE ADULT - ATTENDING COMMENTS
52F with bipolar disorder, hypothyroidism, admitted for acute encephalopathy most likely in the setting of psychosis due to lithium nonadherence with course complicated by acute renal failure.  Patient is followed by psych.   Complex DC

## 2023-01-26 NOTE — PROGRESS NOTE ADULT - SUBJECTIVE AND OBJECTIVE BOX
PATIENT: MELO GRIGGS, MRN: [id number]    CHIEF COMPLAINT: Patient is a 52y old  Female who presents with a chief complaint of behavioral change; abnormal TFT (25 Jan 2023 06:56)      INTERVAL HISTORY/OVERNIGHT EVENTS: No overnight events.    REVIEW OF SYSTEMS:    Constitutional:     [x] negative [ ] fevers [ ] chills  CV:                         [x] negative  [ ] chest pain [ ] palpitations  Resp:                     [x] negative [ ] cough [ ] shortness of breath  GI:                          [x] negative [ ] nausea [ ] vomiting [ ] diarrhea [ ] constipation [ ] abd pain    [ ] All other systems negative  [ ] Unable to assess ROS because ________.    MEDICATIONS:  MEDICATIONS  (STANDING):  heparin   Injectable 5000 Unit(s) SubCutaneous every 8 hours  levothyroxine 125 MICROGram(s) Oral daily  risperiDONE   Tablet 1 milliGRAM(s) Oral daily    MEDICATIONS  (PRN):  acetaminophen     Tablet .. 650 milliGRAM(s) Oral every 6 hours PRN Temp greater or equal to 38C (100.4F), Mild Pain (1 - 3)  haloperidol    Injectable 2 milliGRAM(s) IV Push every 6 hours PRN Agitation  LORazepam   Injectable 2 milliGRAM(s) IntraMuscular every 6 hours PRN Agitation      ALLERGIES: Allergies    No Known Allergies      OBJECTIVE:  ICU Vital Signs Last 24 Hrs  T(C): 36.5 (26 Jan 2023 05:30), Max: 36.7 (25 Jan 2023 21:27)  T(F): 97.7 (26 Jan 2023 05:30), Max: 98.1 (25 Jan 2023 21:27)  HR: 80 (26 Jan 2023 05:30) (75 - 80)  BP: 168/92 (26 Jan 2023 05:30) (121/81 - 168/92)  BP(mean): --  ABP: --  ABP(mean): --  RR: 17 (26 Jan 2023 05:30) (17 - 17)  SpO2: 100% (26 Jan 2023 05:30) (100% - 100%)    O2 Parameters below as of 26 Jan 2023 05:30  Patient On (Oxygen Delivery Method): room air      PHYSICAL EXAMINATION:  General: Comfortable, no acute distress, cooperative with exam.  Respiratory: CTAB, normal respiratory effort  CV: RRR, S1S2, no murmurs, rubs or gallops.  Neurology: AOx2

## 2023-01-27 PROCEDURE — 99232 SBSQ HOSP IP/OBS MODERATE 35: CPT | Mod: GC

## 2023-01-27 PROCEDURE — 99231 SBSQ HOSP IP/OBS SF/LOW 25: CPT

## 2023-01-27 RX ADMIN — HEPARIN SODIUM 5000 UNIT(S): 5000 INJECTION INTRAVENOUS; SUBCUTANEOUS at 14:05

## 2023-01-27 RX ADMIN — HEPARIN SODIUM 5000 UNIT(S): 5000 INJECTION INTRAVENOUS; SUBCUTANEOUS at 20:19

## 2023-01-27 RX ADMIN — HEPARIN SODIUM 5000 UNIT(S): 5000 INJECTION INTRAVENOUS; SUBCUTANEOUS at 05:25

## 2023-01-27 RX ADMIN — RISPERIDONE 1 MILLIGRAM(S): 4 TABLET ORAL at 20:20

## 2023-01-27 RX ADMIN — RISPERIDONE 0.5 MILLIGRAM(S): 4 TABLET ORAL at 06:00

## 2023-01-27 RX ADMIN — Medication 125 MICROGRAM(S): at 05:26

## 2023-01-27 NOTE — PROGRESS NOTE ADULT - SUBJECTIVE AND OBJECTIVE BOX
PATIENT: MELO GRIGGS, MRN: [id number]    CHIEF COMPLAINT: Patient is a 52y old  Female who presents with a chief complaint of behavioral change; abnormal TFT (26 Jan 2023 07:32)      INTERVAL HISTORY/OVERNIGHT EVENTS: No overnight events. No complaints at this time, explained the role of surveillance EKG with regard to risperidone.    REVIEW OF SYSTEMS:    Constitutional:     [x] negative [ ] fevers [ ] chills  CV:                         [x] negative  [ ] chest pain [ ] palpitations  Resp:                     [x] negative [ ] cough [ ] shortness of breath  GI:                          [x] negative [ ] nausea [ ] vomiting [ ] diarrhea [ ] constipation [ ] abd pain    [ ] All other systems negative  [ ] Unable to assess ROS because ________.    MEDICATIONS:  MEDICATIONS  (STANDING):  heparin   Injectable 5000 Unit(s) SubCutaneous every 8 hours  levothyroxine 125 MICROGram(s) Oral daily  risperiDONE   Tablet 0.5 milliGRAM(s) Oral <User Schedule>  risperiDONE   Tablet 1 milliGRAM(s) Oral <User Schedule>    MEDICATIONS  (PRN):  acetaminophen     Tablet .. 650 milliGRAM(s) Oral every 6 hours PRN Temp greater or equal to 38C (100.4F), Mild Pain (1 - 3)  haloperidol    Injectable 2 milliGRAM(s) IV Push every 6 hours PRN Agitation      ALLERGIES: Allergies    No Known Allergies      OBJECTIVE:  ICU Vital Signs Last 24 Hrs  T(C): 36.4 (27 Jan 2023 05:05), Max: 36.9 (26 Jan 2023 13:04)  T(F): 97.5 (27 Jan 2023 05:05), Max: 98.5 (26 Jan 2023 13:04)  HR: 76 (27 Jan 2023 05:05) (73 - 83)  BP: 130/80 (27 Jan 2023 05:05) (126/85 - 132/82)  BP(mean): --  ABP: --  ABP(mean): --  RR: 18 (27 Jan 2023 05:05) (18 - 18)  SpO2: 100% (27 Jan 2023 05:05) (100% - 100%)    O2 Parameters below as of 27 Jan 2023 05:05  Patient On (Oxygen Delivery Method): room air      PHYSICAL EXAMINATION:  General: Comfortable, no acute distress, cooperative with exam.  Respiratory: CTAB, normal respiratory effort  CV: RRR, S1S2, no murmurs, rubs or gallops.  Neurology: AOx2

## 2023-01-27 NOTE — PROGRESS NOTE ADULT - ATTENDING COMMENTS
52F with bipolar disorder, hypothyroidism, admitted for acute encephalopathy most likely in the setting of psychosis due to lithium nonadherence with course complicated by acute renal failure.  f/w psych recs   Complex DC on the case.

## 2023-01-27 NOTE — PROGRESS NOTE ADULT - PROBLEM SELECTOR PLAN 1
She has bipolar disorder on lithium but unclear adherence prior to admission as serum levels low.  - Psychiatry following, recommendations appreciated  - Risperidone 0.5 mg p.o. at 7 a.m. and 1 mg p.o. at 8 p.m.  - Haldol 2 mg IV/IM/p.o. q.6.h. p.r.n. agitation/aggression  - EKG q.48.h.

## 2023-01-28 DIAGNOSIS — N18.9 CHRONIC KIDNEY DISEASE, UNSPECIFIED: ICD-10-CM

## 2023-01-28 PROCEDURE — 99232 SBSQ HOSP IP/OBS MODERATE 35: CPT | Mod: GC

## 2023-01-28 RX ADMIN — HEPARIN SODIUM 5000 UNIT(S): 5000 INJECTION INTRAVENOUS; SUBCUTANEOUS at 05:16

## 2023-01-28 RX ADMIN — RISPERIDONE 0.5 MILLIGRAM(S): 4 TABLET ORAL at 06:39

## 2023-01-28 RX ADMIN — RISPERIDONE 1 MILLIGRAM(S): 4 TABLET ORAL at 20:04

## 2023-01-28 RX ADMIN — HEPARIN SODIUM 5000 UNIT(S): 5000 INJECTION INTRAVENOUS; SUBCUTANEOUS at 20:03

## 2023-01-28 RX ADMIN — Medication 125 MICROGRAM(S): at 05:16

## 2023-01-28 NOTE — PROGRESS NOTE ADULT - ASSESSMENT
52F with bipolar disorder, hypothyroidism, admitted for acute encephalopathy most likely in the setting of psychosis due to lithium nonadherence with course complicated by acute renal failure. 52F with bipolar disorder, hypothyroidism, admitted for acute encephalopathy most likely in the setting of psychosis due to lithium nonadherence with course complicated by acute renal failure and hypothyroidism.

## 2023-01-28 NOTE — PROGRESS NOTE ADULT - SUBJECTIVE AND OBJECTIVE BOX
PATIENT: MELO GRIGGS, MRN: [id number]    CHIEF COMPLAINT: Patient is a 52y old  Female who presents with a chief complaint of behavioral change; abnormal TFT (27 Jan 2023 07:21)      INTERVAL HISTORY/OVERNIGHT EVENTS: No overnight events. Patient denies any complaints.    REVIEW OF SYSTEMS:    Constitutional:     [x] negative [ ] fevers [ ] chills  CV:                         [x] negative  [ ] chest pain [ ] palpitations  Resp:                     [x] negative [ ] cough [ ] shortness of breath  GI:                          [x] negative [ ] nausea [ ] vomiting [ ] diarrhea [ ] constipation [ ] abd pain    [ ] All other systems negative  [ ] Unable to assess ROS because ________.    MEDICATIONS:  MEDICATIONS  (STANDING):  heparin   Injectable 5000 Unit(s) SubCutaneous every 8 hours  levothyroxine 125 MICROGram(s) Oral daily  risperiDONE   Tablet 0.5 milliGRAM(s) Oral <User Schedule>  risperiDONE   Tablet 1 milliGRAM(s) Oral <User Schedule>    MEDICATIONS  (PRN):  acetaminophen     Tablet .. 650 milliGRAM(s) Oral every 6 hours PRN Temp greater or equal to 38C (100.4F), Mild Pain (1 - 3)  haloperidol    Injectable 2 milliGRAM(s) IV Push every 6 hours PRN Agitation      ALLERGIES: Allergies    No Known Allergies      OBJECTIVE:  ICU Vital Signs Last 24 Hrs  T(C): 36.3 (27 Jan 2023 21:15), Max: 36.4 (27 Jan 2023 15:01)  T(F): 97.4 (27 Jan 2023 21:15), Max: 97.6 (27 Jan 2023 15:01)  HR: 80 (28 Jan 2023 05:47) (80 - 93)  BP: 153/94 (28 Jan 2023 05:47) (127/84 - 153/94)  BP(mean): --  ABP: --  ABP(mean): --  RR: 18 (28 Jan 2023 05:47) (18 - 18)  SpO2: 100% (28 Jan 2023 05:47) (100% - 100%)    O2 Parameters below as of 28 Jan 2023 05:47  Patient On (Oxygen Delivery Method): room air      PHYSICAL EXAMINATION:  General: Comfortable, no acute distress, cooperative with exam.  Respiratory: CTAB, normal respiratory effort  CV: RRR, S1S2, no murmurs, rubs or gallops.  Neurology: AOx3

## 2023-01-28 NOTE — PROGRESS NOTE ADULT - ATTENDING COMMENTS
52F with bipolar disorder, hypothyroidism, admitted for acute encephalopathy most likely in the setting of psychosis due to lithium nonadherence with course complicated by acute renal failure and hypothyroidism.  Patient is clinically stable , f/w psych recs , f/w EKG QTC   DC complex  management , needs placement

## 2023-01-28 NOTE — PROGRESS NOTE ADULT - PROBLEM SELECTOR PLAN 3
Unclear baseline, but if this is indeed acute in nature, most likely pre-renal injury.  - BMP q.w.  - She is to discontinue metformin In the setting of her diabetes. A1c now within normal range.  - BMP q.w.  - She is to discontinue metformin

## 2023-01-29 PROCEDURE — 93010 ELECTROCARDIOGRAM REPORT: CPT

## 2023-01-29 PROCEDURE — 99232 SBSQ HOSP IP/OBS MODERATE 35: CPT | Mod: GC

## 2023-01-29 RX ORDER — HALOPERIDOL DECANOATE 100 MG/ML
2 INJECTION INTRAMUSCULAR ONCE
Refills: 0 | Status: COMPLETED | OUTPATIENT
Start: 2023-01-29 | End: 2023-01-29

## 2023-01-29 RX ADMIN — HALOPERIDOL DECANOATE 2 MILLIGRAM(S): 100 INJECTION INTRAMUSCULAR at 23:54

## 2023-01-29 RX ADMIN — HEPARIN SODIUM 5000 UNIT(S): 5000 INJECTION INTRAVENOUS; SUBCUTANEOUS at 13:10

## 2023-01-29 RX ADMIN — RISPERIDONE 0.5 MILLIGRAM(S): 4 TABLET ORAL at 06:50

## 2023-01-29 RX ADMIN — Medication 125 MICROGRAM(S): at 05:28

## 2023-01-29 RX ADMIN — HEPARIN SODIUM 5000 UNIT(S): 5000 INJECTION INTRAVENOUS; SUBCUTANEOUS at 05:29

## 2023-01-29 RX ADMIN — HEPARIN SODIUM 5000 UNIT(S): 5000 INJECTION INTRAVENOUS; SUBCUTANEOUS at 22:58

## 2023-01-29 RX ADMIN — RISPERIDONE 1 MILLIGRAM(S): 4 TABLET ORAL at 22:58

## 2023-01-29 NOTE — PROGRESS NOTE ADULT - ASSESSMENT
52F with bipolar disorder, hypothyroidism, admitted for acute encephalopathy most likely in the setting of psychosis due to lithium nonadherence with course complicated by acute renal failure and hypothyroidism.

## 2023-01-29 NOTE — PROGRESS NOTE ADULT - PROBLEM SELECTOR PLAN 3
In the setting of her diabetes. A1c now within normal range.  - BMP q.w.  - She is to discontinue metformin

## 2023-01-29 NOTE — PROGRESS NOTE ADULT - SUBJECTIVE AND OBJECTIVE BOX
PATIENT: MELO GRIGGS, MRN: [id number]    CHIEF COMPLAINT: Patient is a 52y old  Female who presents with a chief complaint of behavioral change; abnormal TFT (28 Jan 2023 06:54)      INTERVAL HISTORY/OVERNIGHT EVENTS: No overnight events. Patient has no new complaints at this time.    REVIEW OF SYSTEMS:    [x] All other systems negative  [ ] Unable to assess ROS because ________.    MEDICATIONS:  MEDICATIONS  (STANDING):  heparin   Injectable 5000 Unit(s) SubCutaneous every 8 hours  levothyroxine 125 MICROGram(s) Oral daily  risperiDONE   Tablet 0.5 milliGRAM(s) Oral <User Schedule>  risperiDONE   Tablet 1 milliGRAM(s) Oral <User Schedule>    MEDICATIONS  (PRN):  acetaminophen     Tablet .. 650 milliGRAM(s) Oral every 6 hours PRN Temp greater or equal to 38C (100.4F), Mild Pain (1 - 3)  haloperidol    Injectable 2 milliGRAM(s) IV Push every 6 hours PRN Agitation      ALLERGIES: Allergies    No Known Allergies      OBJECTIVE:  ICU Vital Signs Last 24 Hrs  T(C): 36.6 (29 Jan 2023 05:42), Max: 36.6 (28 Jan 2023 12:38)  T(F): 97.8 (29 Jan 2023 05:42), Max: 97.9 (28 Jan 2023 12:38)  HR: 86 (29 Jan 2023 05:42) (77 - 86)  BP: 138/78 (29 Jan 2023 05:42) (123/90 - 138/78)  BP(mean): --  ABP: --  ABP(mean): --  RR: 18 (29 Jan 2023 05:42) (17 - 18)  SpO2: 100% (29 Jan 2023 05:42) (100% - 100%)    O2 Parameters below as of 29 Jan 2023 05:42  Patient On (Oxygen Delivery Method): room air      PHYSICAL EXAMINATION:  General: Comfortable, no acute distress, cooperative with exam.  Respiratory: CTAB, normal respiratory effort  CV: RRR, S1S2, no murmurs, rubs or gallops. No JVD.

## 2023-01-29 NOTE — PROGRESS NOTE ADULT - ATTENDING COMMENTS
52F with bipolar disorder, hypothyroidism, admitted for acute encephalopathy most likely in the setting of psychosis due to lithium nonadherence with course complicated by acute renal failure and hypothyroidism.  Patient pending placement , complex management for DC following , f/w psych recs, f/w QTC   Labs q weekly unless clinical condition changes   Endo f/w outpatient

## 2023-01-30 LAB
ANION GAP SERPL CALC-SCNC: 9 MMOL/L — SIGNIFICANT CHANGE UP (ref 7–14)
BUN SERPL-MCNC: 27 MG/DL — HIGH (ref 7–23)
CALCIUM SERPL-MCNC: 9.5 MG/DL — SIGNIFICANT CHANGE UP (ref 8.4–10.5)
CHLORIDE SERPL-SCNC: 111 MMOL/L — HIGH (ref 98–107)
CO2 SERPL-SCNC: 23 MMOL/L — SIGNIFICANT CHANGE UP (ref 22–31)
CREAT SERPL-MCNC: 1.39 MG/DL — HIGH (ref 0.5–1.3)
EGFR: 46 ML/MIN/1.73M2 — LOW
GLUCOSE SERPL-MCNC: 91 MG/DL — SIGNIFICANT CHANGE UP (ref 70–99)
HCT VFR BLD CALC: 36.5 % — SIGNIFICANT CHANGE UP (ref 34.5–45)
HGB BLD-MCNC: 11.1 G/DL — LOW (ref 11.5–15.5)
MAGNESIUM SERPL-MCNC: 2.1 MG/DL — SIGNIFICANT CHANGE UP (ref 1.6–2.6)
MCHC RBC-ENTMCNC: 27.1 PG — SIGNIFICANT CHANGE UP (ref 27–34)
MCHC RBC-ENTMCNC: 30.4 GM/DL — LOW (ref 32–36)
MCV RBC AUTO: 89.2 FL — SIGNIFICANT CHANGE UP (ref 80–100)
NRBC # BLD: 0 /100 WBCS — SIGNIFICANT CHANGE UP (ref 0–0)
NRBC # FLD: 0 K/UL — SIGNIFICANT CHANGE UP (ref 0–0)
PHOSPHATE SERPL-MCNC: 4.1 MG/DL — SIGNIFICANT CHANGE UP (ref 2.5–4.5)
PLATELET # BLD AUTO: 244 K/UL — SIGNIFICANT CHANGE UP (ref 150–400)
POTASSIUM SERPL-MCNC: 4.2 MMOL/L — SIGNIFICANT CHANGE UP (ref 3.5–5.3)
POTASSIUM SERPL-SCNC: 4.2 MMOL/L — SIGNIFICANT CHANGE UP (ref 3.5–5.3)
RBC # BLD: 4.09 M/UL — SIGNIFICANT CHANGE UP (ref 3.8–5.2)
RBC # FLD: 13.3 % — SIGNIFICANT CHANGE UP (ref 10.3–14.5)
SODIUM SERPL-SCNC: 143 MMOL/L — SIGNIFICANT CHANGE UP (ref 135–145)
WBC # BLD: 6.14 K/UL — SIGNIFICANT CHANGE UP (ref 3.8–10.5)
WBC # FLD AUTO: 6.14 K/UL — SIGNIFICANT CHANGE UP (ref 3.8–10.5)

## 2023-01-30 PROCEDURE — 99231 SBSQ HOSP IP/OBS SF/LOW 25: CPT

## 2023-01-30 PROCEDURE — 99232 SBSQ HOSP IP/OBS MODERATE 35: CPT | Mod: GC

## 2023-01-30 RX ORDER — RISPERIDONE 4 MG/1
1 TABLET ORAL
Refills: 0 | Status: DISCONTINUED | OUTPATIENT
Start: 2023-01-31 | End: 2023-03-10

## 2023-01-30 RX ADMIN — RISPERIDONE 1 MILLIGRAM(S): 4 TABLET ORAL at 22:56

## 2023-01-30 RX ADMIN — HEPARIN SODIUM 5000 UNIT(S): 5000 INJECTION INTRAVENOUS; SUBCUTANEOUS at 22:56

## 2023-01-30 RX ADMIN — HEPARIN SODIUM 5000 UNIT(S): 5000 INJECTION INTRAVENOUS; SUBCUTANEOUS at 06:14

## 2023-01-30 RX ADMIN — RISPERIDONE 0.5 MILLIGRAM(S): 4 TABLET ORAL at 06:14

## 2023-01-30 RX ADMIN — Medication 125 MICROGRAM(S): at 06:14

## 2023-01-30 RX ADMIN — HEPARIN SODIUM 5000 UNIT(S): 5000 INJECTION INTRAVENOUS; SUBCUTANEOUS at 13:12

## 2023-01-30 NOTE — PROGRESS NOTE ADULT - ATTENDING COMMENTS
52F previously domiciled with recently  mother PMH bipolar disorder, hypothyroidism BIBEMS for abnormal behavior reported by cousin. Admitted for acute encephalopathy in the setting of psychosis due to lithium nonadherence with course c/b acute renal failure and severe hypothyroidism (TSH 400s on admission).    Required haldol overnight for agitation  Increase Risperidone PO to 1mg q 7am+ 1mg q 8PM per psych recs, monitor QTC  TSH downtrended, c/w synthroid 125mcg per Endo recs. Repeat TSH and Free T4 in 4-6 weeks as outpatient  [id number]: 5.5, can stop metformin on DC  Labs q weekly unless clinical condition changes   Complex DC planning as patient unable to care for self and pt's mother was HCP but is now recently . Requires placement but per psych does not meet criteria for inpatient psych

## 2023-01-30 NOTE — PROGRESS NOTE ADULT - PROBLEM SELECTOR PLAN 2
Reported to be on Synthroid 125 mcg q.d., likely also non-adherent with significant elevation in TSH. Initially presenting with some lethargy. Completed steroid taper (1/15) due to concern for adrenal insufficiency.  - Endocrinology recommendations appreciated  - Levothyroxine 125 mcg p.o. q.d.  - Will require TSH and fT4 measurement in 4-6 weeks Reported to be on Synthroid 125 mcg q.d., likely also non-adherent with significant elevation in TSH. Initially presenting with some lethargy. Completed steroid taper (1/15) due to concern for adrenal insufficiency.  - Levothyroxine 125 mcg p.o. q.d.  - Will require TSH and fT4 measurement in 4-6 weeks  - Endocrinology recommendations appreciated

## 2023-01-30 NOTE — PROGRESS NOTE ADULT - SUBJECTIVE AND OBJECTIVE BOX
DATE OF SERVICE: 01-30-23 @ 07:50    Patient is a 52y old  Female who presents with a chief complaint of behavioral change; abnormal TFT (29 Jan 2023 07:30)      SUBJECTIVE / OVERNIGHT EVENTS:  Required 2 mg IV haldol last night for agitation.  Afebrile, hemodynamically stable  ***    MEDICATIONS  (STANDING):  heparin   Injectable 5000 Unit(s) SubCutaneous every 8 hours  levothyroxine 125 MICROGram(s) Oral daily  risperiDONE   Tablet 0.5 milliGRAM(s) Oral <User Schedule>  risperiDONE   Tablet 1 milliGRAM(s) Oral <User Schedule>    MEDICATIONS  (PRN):  acetaminophen     Tablet .. 650 milliGRAM(s) Oral every 6 hours PRN Temp greater or equal to 38C (100.4F), Mild Pain (1 - 3)  haloperidol    Injectable 2 milliGRAM(s) IV Push every 6 hours PRN Agitation      Vital Signs Last 24 Hrs  T(C): 36.6 (30 Jan 2023 06:10), Max: 36.9 (29 Jan 2023 21:00)  T(F): 97.8 (30 Jan 2023 06:10), Max: 98.5 (29 Jan 2023 21:00)  HR: 93 (30 Jan 2023 06:10) (85 - 93)  BP: 129/87 (30 Jan 2023 06:10) (103/66 - 140/79)  BP(mean): --  RR: 18 (30 Jan 2023 06:10) (17 - 19)  SpO2: 100% (30 Jan 2023 06:10) (98% - 100%)    Parameters below as of 30 Jan 2023 06:10  Patient On (Oxygen Delivery Method): room air      CAPILLARY BLOOD GLUCOSE        I&O's Summary      PHYSICAL EXAM:  ***  GENERAL: No apparent distress  HEAD:  Atraumatic, normocephalic  EYES: EOMI, PERRLA, conjunctiva and sclera clear  NECK: Supple, no JVD  CHEST/LUNG: Clear to auscultation bilaterally; No wheeze, rales, rhonchi  HEART: Regular rate and rhythm; No murmurs, rubs, or gallops  ABDOMEN: Soft, nontender, nondistended; Bowel sounds present  EXTREMITIES:  2+ peripheral pulses; No clubbing, cyanosis, or edema  PSYCH: AAOx3  NEUROLOGY: No focal deficits  SKIN: No rashes or lesions      LABS:    ***              RADIOLOGY & ADDITIONAL TESTS:  Reviewed   DATE OF SERVICE: 01-30-23 @ 07:50    Patient is a 52y old  Female who presents with a chief complaint of behavioral change; abnormal TFT (29 Jan 2023 07:30)      SUBJECTIVE / OVERNIGHT EVENTS:  Required 2 mg IV haldol last night for agitation.  Afebrile, hemodynamically stable  No complaints today    MEDICATIONS  (STANDING):  heparin   Injectable 5000 Unit(s) SubCutaneous every 8 hours  levothyroxine 125 MICROGram(s) Oral daily  risperiDONE   Tablet 0.5 milliGRAM(s) Oral <User Schedule>  risperiDONE   Tablet 1 milliGRAM(s) Oral <User Schedule>    MEDICATIONS  (PRN):  acetaminophen     Tablet .. 650 milliGRAM(s) Oral every 6 hours PRN Temp greater or equal to 38C (100.4F), Mild Pain (1 - 3)  haloperidol    Injectable 2 milliGRAM(s) IV Push every 6 hours PRN Agitation      Vital Signs Last 24 Hrs  T(C): 36.6 (30 Jan 2023 06:10), Max: 36.9 (29 Jan 2023 21:00)  T(F): 97.8 (30 Jan 2023 06:10), Max: 98.5 (29 Jan 2023 21:00)  HR: 93 (30 Jan 2023 06:10) (85 - 93)  BP: 129/87 (30 Jan 2023 06:10) (103/66 - 140/79)  BP(mean): --  RR: 18 (30 Jan 2023 06:10) (17 - 19)  SpO2: 100% (30 Jan 2023 06:10) (98% - 100%)    Parameters below as of 30 Jan 2023 06:10  Patient On (Oxygen Delivery Method): room air      CAPILLARY BLOOD GLUCOSE        I&O's Summary      PHYSICAL EXAM:  GENERAL: No apparent distress  HEAD:  Atraumatic, normocephalic  EYES: EOMI, PERRLA, conjunctiva and sclera clear  NECK: Supple, no JVD  CHEST/LUNG: Clear to auscultation bilaterally; No wheeze, rales, rhonchi  HEART: Regular rate and rhythm; No murmurs, rubs, or gallops  ABDOMEN: Soft, nontender, nondistended; Bowel sounds present  EXTREMITIES:  2+ peripheral pulses; No clubbing, cyanosis, or edema  PSYCH: AAOx3  NEUROLOGY: No focal deficits  SKIN: No rashes or lesions      LABS:                          11.1   6.14  )-----------( 244      ( 30 Jan 2023 06:24 )             36.5     01-30    143  |  111<H>  |  27<H>  ----------------------------<  91  4.2   |  23  |  1.39<H>    Ca    9.5      30 Jan 2023 06:24  Phos  4.1     01-30  Mg     2.10     01-30      RADIOLOGY & ADDITIONAL TESTS:  Reviewed   DATE OF SERVICE: 01-30-23 @ 07:50    Patient is a 52y old  Female who presents with a chief complaint of behavioral change; abnormal TFT (29 Jan 2023 07:30)      SUBJECTIVE / OVERNIGHT EVENTS:  Required 2 mg IV haldol last night for agitation.  Afebrile, hemodynamically stable  No complaints today    MEDICATIONS  (STANDING):  heparin   Injectable 5000 Unit(s) SubCutaneous every 8 hours  levothyroxine 125 MICROGram(s) Oral daily  risperiDONE   Tablet 0.5 milliGRAM(s) Oral <User Schedule>  risperiDONE   Tablet 1 milliGRAM(s) Oral <User Schedule>    MEDICATIONS  (PRN):  acetaminophen     Tablet .. 650 milliGRAM(s) Oral every 6 hours PRN Temp greater or equal to 38C (100.4F), Mild Pain (1 - 3)  haloperidol    Injectable 2 milliGRAM(s) IV Push every 6 hours PRN Agitation      Vital Signs Last 24 Hrs  T(C): 36.6 (30 Jan 2023 06:10), Max: 36.9 (29 Jan 2023 21:00)  T(F): 97.8 (30 Jan 2023 06:10), Max: 98.5 (29 Jan 2023 21:00)  HR: 93 (30 Jan 2023 06:10) (85 - 93)  BP: 129/87 (30 Jan 2023 06:10) (103/66 - 140/79)  BP(mean): --  RR: 18 (30 Jan 2023 06:10) (17 - 19)  SpO2: 100% (30 Jan 2023 06:10) (98% - 100%)    Parameters below as of 30 Jan 2023 06:10  Patient On (Oxygen Delivery Method): room air      CAPILLARY BLOOD GLUCOSE        I&O's Summary      PHYSICAL EXAM:  GENERAL: No apparent distress  EYES: sclera clear  CHEST/LUNG: Clear to auscultation bilaterally; No wheeze, rales, rhonchi  HEART: Regular rate and rhythm; No murmurs, rubs, or gallops  ABDOMEN: Soft, nontender, nondistended; Bowel sounds present  EXTREMITIES:  2+ peripheral pulses; No clubbing, cyanosis, or edema  PSYCH: AAOx3, withdrawn, flat affect  NEUROLOGY: No focal deficits  SKIN: No rashes or lesions      LABS:                          11.1   6.14  )-----------( 244      ( 30 Jan 2023 06:24 )             36.5     01-30    143  |  111<H>  |  27<H>  ----------------------------<  91  4.2   |  23  |  1.39<H>    Ca    9.5      30 Jan 2023 06:24  Phos  4.1     01-30  Mg     2.10     01-30      RADIOLOGY & ADDITIONAL TESTS:  Reviewed

## 2023-01-30 NOTE — PROGRESS NOTE ADULT - PROBLEM SELECTOR PLAN 1
She has bipolar disorder on lithium but unclear adherence prior to admission as serum levels low.  - Psychiatry following, recommendations appreciated  - Risperidone 0.5 mg p.o. at 7 a.m. and 1 mg p.o. at 8 p.m.  - Haldol 2 mg IV/IM/p.o. q.6.h. p.r.n. agitation/aggression  - EKG q.48.h. She has bipolar disorder previously on lithium but unclear adherence prior to admission as serum levels low.  - Risperidone 0.5 mg p.o. at 7 a.m. and 1 mg p.o. at 8 p.m.  - Haldol 2 mg IV/IM/p.o. q6h prn for agitation/aggression (required x1 on 1/30 early am)  - EKG q48h to monitor QTc  - Psychiatry following, recs appreciated

## 2023-01-30 NOTE — PROGRESS NOTE ADULT - PROBLEM SELECTOR PLAN 3
In the setting of her diabetes. A1c now within normal range.  - BMP q.w.  - She is to discontinue metformin In the setting of her diabetes. A1c now within normal range.  - BMP weekly  - d/c metformin on discharge

## 2023-01-31 PROCEDURE — 99232 SBSQ HOSP IP/OBS MODERATE 35: CPT | Mod: GC

## 2023-01-31 PROCEDURE — 93010 ELECTROCARDIOGRAM REPORT: CPT

## 2023-01-31 RX ADMIN — Medication 125 MICROGRAM(S): at 06:05

## 2023-01-31 RX ADMIN — HEPARIN SODIUM 5000 UNIT(S): 5000 INJECTION INTRAVENOUS; SUBCUTANEOUS at 12:33

## 2023-01-31 RX ADMIN — HEPARIN SODIUM 5000 UNIT(S): 5000 INJECTION INTRAVENOUS; SUBCUTANEOUS at 06:04

## 2023-01-31 RX ADMIN — RISPERIDONE 1 MILLIGRAM(S): 4 TABLET ORAL at 20:15

## 2023-01-31 RX ADMIN — HEPARIN SODIUM 5000 UNIT(S): 5000 INJECTION INTRAVENOUS; SUBCUTANEOUS at 21:20

## 2023-01-31 RX ADMIN — RISPERIDONE 1 MILLIGRAM(S): 4 TABLET ORAL at 08:45

## 2023-01-31 NOTE — PROGRESS NOTE ADULT - SUBJECTIVE AND OBJECTIVE BOX
DATE OF SERVICE: 01-31-23 @ 07:32    Patient is a 52y old  Female who presents with a chief complaint of behavioral change; abnormal TFT (30 Jan 2023 07:49)      SUBJECTIVE / OVERNIGHT EVENTS:  No acute events overnight  Afebrile, hemodynamically stable  ***    MEDICATIONS  (STANDING):  heparin   Injectable 5000 Unit(s) SubCutaneous every 8 hours  levothyroxine 125 MICROGram(s) Oral daily  risperiDONE   Tablet 1 milliGRAM(s) Oral <User Schedule>  risperiDONE   Tablet 1 milliGRAM(s) Oral <User Schedule>    MEDICATIONS  (PRN):  acetaminophen     Tablet .. 650 milliGRAM(s) Oral every 6 hours PRN Temp greater or equal to 38C (100.4F), Mild Pain (1 - 3)  haloperidol    Injectable 2 milliGRAM(s) IV Push every 6 hours PRN Agitation      Vital Signs Last 24 Hrs  T(C): 36.8 (31 Jan 2023 06:46), Max: 37.1 (30 Jan 2023 21:41)  T(F): 98.2 (31 Jan 2023 06:46), Max: 98.7 (30 Jan 2023 21:41)  HR: 89 (31 Jan 2023 06:46) (78 - 89)  BP: 135/85 (31 Jan 2023 06:46) (116/83 - 150/93)  BP(mean): --  RR: 17 (30 Jan 2023 21:41) (17 - 17)  SpO2: 100% (30 Jan 2023 21:41) (100% - 100%)    Parameters below as of 31 Jan 2023 06:46  Patient On (Oxygen Delivery Method): room air        PHYSICAL EXAM:  GENERAL: No apparent distress  EYES: sclera clear  CHEST/LUNG: Clear to auscultation bilaterally; No wheeze, rales, rhonchi  HEART: Regular rate and rhythm; No murmurs, rubs, or gallops  ABDOMEN: Soft, nontender, nondistended; Bowel sounds present  EXTREMITIES:  2+ peripheral pulses; No clubbing, cyanosis, or edema  PSYCH: AAOx3, withdrawn, flat affect  NEUROLOGY: No focal deficits  SKIN: No rashes or lesions      LABS:                        11.1   6.14  )-----------( 244      ( 30 Jan 2023 06:24 )             36.5     01-30    143  |  111<H>  |  27<H>  ----------------------------<  91  4.2   |  23  |  1.39<H>    Ca    9.5      30 Jan 2023 06:24  Phos  4.1     01-30  Mg     2.10     01-30      RADIOLOGY & ADDITIONAL TESTS:  Reviewed    ***   DATE OF SERVICE: 01-31-23 @ 07:32    Patient is a 52y old  Female who presents with a chief complaint of behavioral change; abnormal TFT (30 Jan 2023 07:49)      SUBJECTIVE / OVERNIGHT EVENTS:  No acute events overnight  Afebrile, hemodynamically stable  Denies any complaints this am. Feels a little tired but denies feeling confused, agitated, or anxious.     MEDICATIONS  (STANDING):  heparin   Injectable 5000 Unit(s) SubCutaneous every 8 hours  levothyroxine 125 MICROGram(s) Oral daily  risperiDONE   Tablet 1 milliGRAM(s) Oral <User Schedule>  risperiDONE   Tablet 1 milliGRAM(s) Oral <User Schedule>    MEDICATIONS  (PRN):  acetaminophen     Tablet .. 650 milliGRAM(s) Oral every 6 hours PRN Temp greater or equal to 38C (100.4F), Mild Pain (1 - 3)  haloperidol    Injectable 2 milliGRAM(s) IV Push every 6 hours PRN Agitation      Vital Signs Last 24 Hrs  T(C): 36.8 (31 Jan 2023 06:46), Max: 37.1 (30 Jan 2023 21:41)  T(F): 98.2 (31 Jan 2023 06:46), Max: 98.7 (30 Jan 2023 21:41)  HR: 89 (31 Jan 2023 06:46) (78 - 89)  BP: 135/85 (31 Jan 2023 06:46) (116/83 - 150/93)  BP(mean): --  RR: 17 (30 Jan 2023 21:41) (17 - 17)  SpO2: 100% (30 Jan 2023 21:41) (100% - 100%)    Parameters below as of 31 Jan 2023 06:46  Patient On (Oxygen Delivery Method): room air        PHYSICAL EXAM:  GENERAL: No apparent distress  EYES: sclera clear  CHEST/LUNG: Clear to auscultation bilaterally; No wheeze, rales, rhonchi  HEART: Regular rate and rhythm; No murmurs, rubs, or gallops  ABDOMEN: Soft, nontender, nondistended; Bowel sounds present  EXTREMITIES:  2+ peripheral pulses; No clubbing, cyanosis, or edema  PSYCH: AAOx3, withdrawn, flat affect  NEUROLOGY: No focal deficits  SKIN: No rashes or lesions      LABS:                        11.1   6.14  )-----------( 244      ( 30 Jan 2023 06:24 )             36.5     01-30    143  |  111<H>  |  27<H>  ----------------------------<  91  4.2   |  23  |  1.39<H>    Ca    9.5      30 Jan 2023 06:24  Phos  4.1     01-30  Mg     2.10     01-30      RADIOLOGY & ADDITIONAL TESTS:  Reviewed

## 2023-01-31 NOTE — PROGRESS NOTE ADULT - ASSESSMENT
52F with bipolar disorder, hypothyroidism, admitted for acute encephalopathy most likely in the setting of psychosis due to lithium nonadherence with course complicated by acute renal failure and hypothyroidism. Now on risperidone and pending placement to group home.

## 2023-01-31 NOTE — PROGRESS NOTE ADULT - PROBLEM SELECTOR PLAN 2
Reported to be on Synthroid 125 mcg q.d., likely also non-adherent with significant elevation in TSH. Initially presenting with some lethargy. Completed steroid taper (1/15) due to concern for adrenal insufficiency.  - Levothyroxine 125 mcg p.o. q.d.  - Will require TSH and fT4 measurement in 4-6 weeks  - Endocrinology recommendations appreciated

## 2023-01-31 NOTE — PROGRESS NOTE ADULT - PROBLEM SELECTOR PLAN 3
In the setting of her diabetes. A1c now within normal range.  - BMP weekly  - d/c metformin on discharge

## 2023-01-31 NOTE — PROGRESS NOTE ADULT - ATTENDING COMMENTS
52F previously domiciled with recently  mother PMH bipolar disorder, hypothyroidism BIBEMS for abnormal behavior reported by cousin. Admitted for acute encephalopathy in the setting of psychosis due to lithium nonadherence with course c/b acute renal failure and severe hypothyroidism (TSH 400s on admission).    C/w risperidone 1mg q 7am+ 1mg q 8PM per psych recs, monitor QTC. Did not require PRN Haldol overnight  c/w synthroid 125mcg per Endo recs. Repeat TSH and Free T4 in 4-6 weeks as outpatient  [id number]: 5.5, can stop metformin on DC  Labs q weekly unless clinical condition changes   Complex DC planning as patient unable to care for self and pt's mother was HCP but is now recently .   Requires placement but per psych does not meet criteria for inpatient psych

## 2023-01-31 NOTE — PROGRESS NOTE ADULT - PROBLEM SELECTOR PLAN 1
She has bipolar disorder previously on lithium but unclear adherence prior to admission as serum levels low.  - Risperidone 1 mg bid (7 am & 8 pm)  - Haldol 2 mg IV/IM/p.o. q6h prn for agitation/aggression (required x1 on 1/30 early am)  - EKG q48h to monitor QTc  - Psychiatry following, recs appreciated She has bipolar disorder previously on lithium but unclear adherence prior to admission as serum levels low.  - Risperidone 1 mg bid (7 am & 8 pm)  - Haldol 2 mg IV/IM/p.o. q6h prn for agitation/aggression (required x1 on 1/30 early am)  - f/u EKG today given increase in risperidone dosing, if QTc ok can trend EKG weekly  - Psychiatry following, recs appreciated

## 2023-02-01 PROCEDURE — 99231 SBSQ HOSP IP/OBS SF/LOW 25: CPT | Mod: GC

## 2023-02-01 RX ADMIN — HEPARIN SODIUM 5000 UNIT(S): 5000 INJECTION INTRAVENOUS; SUBCUTANEOUS at 13:52

## 2023-02-01 RX ADMIN — RISPERIDONE 1 MILLIGRAM(S): 4 TABLET ORAL at 20:25

## 2023-02-01 RX ADMIN — RISPERIDONE 1 MILLIGRAM(S): 4 TABLET ORAL at 06:05

## 2023-02-01 RX ADMIN — Medication 125 MICROGRAM(S): at 05:21

## 2023-02-01 RX ADMIN — HEPARIN SODIUM 5000 UNIT(S): 5000 INJECTION INTRAVENOUS; SUBCUTANEOUS at 05:21

## 2023-02-01 RX ADMIN — HEPARIN SODIUM 5000 UNIT(S): 5000 INJECTION INTRAVENOUS; SUBCUTANEOUS at 21:10

## 2023-02-01 NOTE — PROGRESS NOTE ADULT - PROBLEM SELECTOR PLAN 1
She has bipolar disorder previously on lithium but unclear adherence prior to admission as serum levels low.  - Risperidone 1 mg bid (7 am & 8 pm)  - Haldol 2 mg IV/IM/p.o. q6h prn for agitation/aggression (last required x1 on 1/30 early am)  - QTc 430s after increasing risperidone dose yesterday  - weekly EKG  - Psychiatry following, recs appreciated

## 2023-02-01 NOTE — PROGRESS NOTE ADULT - SUBJECTIVE AND OBJECTIVE BOX
[Negative] : Genitourinary DATE OF SERVICE: 02-01-23 @ 07:37    Patient is a 52y old  Female who presents with a chief complaint of behavioral change; abnormal TFT (31 Jan 2023 07:32)      SUBJECTIVE / OVERNIGHT EVENTS:  No acute events overnight  Afebrile, hemodynamically stable  ***    MEDICATIONS  (STANDING):  heparin   Injectable 5000 Unit(s) SubCutaneous every 8 hours  levothyroxine 125 MICROGram(s) Oral daily  risperiDONE   Tablet 1 milliGRAM(s) Oral <User Schedule>  risperiDONE   Tablet 1 milliGRAM(s) Oral <User Schedule>    MEDICATIONS  (PRN):  acetaminophen     Tablet .. 650 milliGRAM(s) Oral every 6 hours PRN Temp greater or equal to 38C (100.4F), Mild Pain (1 - 3)  haloperidol    Injectable 2 milliGRAM(s) IV Push every 6 hours PRN Agitation      Vital Signs Last 24 Hrs  T(C): 36.3 (01 Feb 2023 05:21), Max: 36.7 (31 Jan 2023 21:10)  T(F): 97.4 (01 Feb 2023 05:21), Max: 98 (31 Jan 2023 21:10)  HR: 105 (01 Feb 2023 05:21) (87 - 105)  BP: 139/84 (01 Feb 2023 05:21) (139/84 - 149/98)  BP(mean): --  RR: 18 (01 Feb 2023 05:21) (18 - 18)  SpO2: 100% (01 Feb 2023 05:21) (100% - 100%)    Parameters below as of 01 Feb 2023 05:21  Patient On (Oxygen Delivery Method): room air      PHYSICAL EXAM:  GENERAL: No apparent distress  EYES: sclera clear  CHEST/LUNG: Clear to auscultation bilaterally; No wheeze, rales, rhonchi  HEART: Regular rate and rhythm; No murmurs, rubs, or gallops  ABDOMEN: Soft, nontender, nondistended; Bowel sounds present  EXTREMITIES:  2+ peripheral pulses; No clubbing, cyanosis, or edema  PSYCH: AAOx3, withdrawn, flat affect  NEUROLOGY: No focal deficits  SKIN: No rashes or lesions    LABS:  Reviewed        RADIOLOGY & ADDITIONAL TESTS:  Reviewed       DATE OF SERVICE: 02-01-23 @ 07:37    Patient is a 52y old  Female who presents with a chief complaint of behavioral change; abnormal TFT (31 Jan 2023 07:32)      SUBJECTIVE / OVERNIGHT EVENTS:  No acute events overnight  Afebrile, hemodynamically stable  Reports intermittent cough overnight, mild headache which improved with Tylenol. No other complaints    MEDICATIONS  (STANDING):  heparin   Injectable 5000 Unit(s) SubCutaneous every 8 hours  levothyroxine 125 MICROGram(s) Oral daily  risperiDONE   Tablet 1 milliGRAM(s) Oral <User Schedule>  risperiDONE   Tablet 1 milliGRAM(s) Oral <User Schedule>    MEDICATIONS  (PRN):  acetaminophen     Tablet .. 650 milliGRAM(s) Oral every 6 hours PRN Temp greater or equal to 38C (100.4F), Mild Pain (1 - 3)  haloperidol    Injectable 2 milliGRAM(s) IV Push every 6 hours PRN Agitation      Vital Signs Last 24 Hrs  T(C): 36.3 (01 Feb 2023 05:21), Max: 36.7 (31 Jan 2023 21:10)  T(F): 97.4 (01 Feb 2023 05:21), Max: 98 (31 Jan 2023 21:10)  HR: 105 (01 Feb 2023 05:21) (87 - 105)  BP: 139/84 (01 Feb 2023 05:21) (139/84 - 149/98)  BP(mean): --  RR: 18 (01 Feb 2023 05:21) (18 - 18)  SpO2: 100% (01 Feb 2023 05:21) (100% - 100%)    Parameters below as of 01 Feb 2023 05:21  Patient On (Oxygen Delivery Method): room air      PHYSICAL EXAM:  GENERAL: No apparent distress  EYES: sclera clear  CHEST/LUNG: Clear to auscultation bilaterally; No wheeze, rales, rhonchi  HEART: Regular rate and rhythm; No murmurs, rubs, or gallops  ABDOMEN: Soft, nontender, nondistended; Bowel sounds present  EXTREMITIES:  2+ peripheral pulses; No clubbing, cyanosis, or edema  PSYCH: AAOx3, withdrawn, flat affect  NEUROLOGY: No focal deficits  SKIN: No rashes or lesions    LABS:  Reviewed        RADIOLOGY & ADDITIONAL TESTS:  Reviewed

## 2023-02-01 NOTE — PROGRESS NOTE ADULT - PROBLEM SELECTOR PLAN 2
Reported to be on Synthroid 125 mcg q.d., likely also non-adherent with significant elevation in TSH. Initially presenting with some lethargy. Completed steroid taper (1/15) due to concern for adrenal insufficiency.  - Levothyroxine 125 mcg  - Will require TSH and fT4 measurement in 4-6 weeks  - Endocrinology recommendations appreciated

## 2023-02-01 NOTE — PROGRESS NOTE ADULT - ATTENDING COMMENTS
52F previously domiciled with recently  mother PMH bipolar disorder, hypothyroidism BIBEMS for abnormal behavior reported by cousin. Admitted for acute encephalopathy in the setting of psychosis due to lithium nonadherence with course c/b acute renal failure and severe hypothyroidism (TSH 400s on admission).    Remains stable    C/w risperidone 1mg q 7am+ 1mg q 8PM per psych recs, monitor QTC weekly unless new changes.  c/w synthroid 125mcg per Endo recs. Repeat TSH and Free T4 in 4-6 weeks as outpatient  [id number]: 5.5, can stop metformin on DC  Labs q weekly unless clinical condition changes   Complex DC planning as patient unable to care for self and pt's mother was HCP but is now recently .   Requires placement but per psych does not meet criteria for inpatient psych.

## 2023-02-02 PROCEDURE — 99231 SBSQ HOSP IP/OBS SF/LOW 25: CPT

## 2023-02-02 PROCEDURE — 99231 SBSQ HOSP IP/OBS SF/LOW 25: CPT | Mod: GC

## 2023-02-02 RX ORDER — BENZTROPINE MESYLATE 1 MG
0.5 TABLET ORAL
Refills: 0 | Status: DISCONTINUED | OUTPATIENT
Start: 2023-02-02 | End: 2023-03-10

## 2023-02-02 RX ADMIN — RISPERIDONE 1 MILLIGRAM(S): 4 TABLET ORAL at 21:50

## 2023-02-02 RX ADMIN — HEPARIN SODIUM 5000 UNIT(S): 5000 INJECTION INTRAVENOUS; SUBCUTANEOUS at 13:30

## 2023-02-02 RX ADMIN — RISPERIDONE 1 MILLIGRAM(S): 4 TABLET ORAL at 06:12

## 2023-02-02 RX ADMIN — Medication 0.5 MILLIGRAM(S): at 17:50

## 2023-02-02 RX ADMIN — Medication 125 MICROGRAM(S): at 05:15

## 2023-02-02 RX ADMIN — HEPARIN SODIUM 5000 UNIT(S): 5000 INJECTION INTRAVENOUS; SUBCUTANEOUS at 21:50

## 2023-02-02 RX ADMIN — HEPARIN SODIUM 5000 UNIT(S): 5000 INJECTION INTRAVENOUS; SUBCUTANEOUS at 05:15

## 2023-02-02 NOTE — CHART NOTE - NSCHARTNOTEFT_GEN_A_CORE
Source: Patient [x ]    Family [ ]     other [x ] chart review    Patient seen for nutrition f/u. 52 year old female with a PMH of bipolar disorder, hypothyroidism, admitted for acute encephalopathy most likely in the setting of psychosis due to lithium nonadherence with course complicated by acute renal failure and hypothyroidism. Now on risperidone and pending placement to group home per chart.    Patient reports good appetite. Noted w/ intakes for the most part % w/ some 0-25% per RN flow sheet. No food preferences at this time. No GI distress or chewing/swallowing difficulties reported. No edema or pressure injuries noted per RN flow sheet.    Diet : Diet, Regular:   Consistent Carbohydrate {Evening Snack} (CSTCHOSN)  DASH/TLC {Sodium & Cholesterol Restricted} (DASH) (01-12-23 @ 15:20)    Current Weight: no new weight to assess  61 kg (1/18)  59.4 kg (1/13)    Pertinent Medications: MEDICATIONS  (STANDING):  heparin   Injectable 5000 Unit(s) SubCutaneous every 8 hours  levothyroxine 125 MICROGram(s) Oral daily  risperiDONE   Tablet 1 milliGRAM(s) Oral <User Schedule>  risperiDONE   Tablet 1 milliGRAM(s) Oral <User Schedule>    MEDICATIONS  (PRN):  acetaminophen     Tablet .. 650 milliGRAM(s) Oral every 6 hours PRN Temp greater or equal to 38C (100.4F), Mild Pain (1 - 3)  haloperidol    Injectable 2 milliGRAM(s) IV Push every 6 hours PRN Agitation    Pertinent Labs: no new labs to assess    Estimated Needs:   [x ] no change since previous assessment    Previous Nutrition Diagnosis: Inadequate oral intake    Nutrition Diagnosis is [x ] ongoing  [ ] resolved [ ] not applicable     Recommend  - continue diet as ordered  - Glucerna BID (440 kcal, 20 g pro)  - obtain current weight and continue to document PO intake to monitor trend     Monitoring and Evaluation:   [x ] PO intake [x ] Tolerance to diet prescription [x ] weights [x ] follow up per protocol

## 2023-02-02 NOTE — PROGRESS NOTE ADULT - PROVIDER SPECIALTY LIST ADULT
PT has been having abdominal pain since this morning and it progressed over time. He said he vomited 4 times today. He is c/o of LLQ and Headache. Internal Medicine

## 2023-02-02 NOTE — PROGRESS NOTE ADULT - SUBJECTIVE AND OBJECTIVE BOX
DATE OF SERVICE: 02-02-23 @ 07:36    Patient is a 52y old  Female who presents with a chief complaint of behavioral change; abnormal TFT (01 Feb 2023 07:36)      SUBJECTIVE / OVERNIGHT EVENTS:  No acute events overnight  Afebrile, hemodynamically stable  ***    MEDICATIONS  (STANDING):  heparin   Injectable 5000 Unit(s) SubCutaneous every 8 hours  levothyroxine 125 MICROGram(s) Oral daily  risperiDONE   Tablet 1 milliGRAM(s) Oral <User Schedule>  risperiDONE   Tablet 1 milliGRAM(s) Oral <User Schedule>    MEDICATIONS  (PRN):  acetaminophen     Tablet .. 650 milliGRAM(s) Oral every 6 hours PRN Temp greater or equal to 38C (100.4F), Mild Pain (1 - 3)  haloperidol    Injectable 2 milliGRAM(s) IV Push every 6 hours PRN Agitation      Vital Signs Last 24 Hrs  T(C): 36.3 (02 Feb 2023 05:34), Max: 36.9 (01 Feb 2023 21:55)  T(F): 97.3 (02 Feb 2023 05:34), Max: 98.5 (01 Feb 2023 21:55)  HR: 94 (02 Feb 2023 05:34) (88 - 107)  BP: 126/73 (02 Feb 2023 05:34) (126/73 - 138/86)  BP(mean): --  RR: 17 (02 Feb 2023 05:34) (17 - 18)  SpO2: 100% (02 Feb 2023 05:34) (99% - 100%)    Parameters below as of 02 Feb 2023 05:34  Patient On (Oxygen Delivery Method): room air      CAPILLARY BLOOD GLUCOSE        I&O's Summary    01 Feb 2023 07:01  -  02 Feb 2023 07:00  --------------------------------------------------------  IN: 1100 mL / OUT: 0 mL / NET: 1100 mL        PHYSICAL EXAM:  GENERAL: No apparent distress  EYES: sclera clear  CHEST/LUNG: Clear to auscultation bilaterally; No wheeze, rales, rhonchi  HEART: Regular rate and rhythm; No murmurs, rubs, or gallops  ABDOMEN: Soft, nontender, nondistended; Bowel sounds present  EXTREMITIES:  2+ peripheral pulses; No clubbing, cyanosis, or edema  PSYCH: AAOx3, withdrawn, flat affect  NEUROLOGY: No focal deficits  SKIN: No rashes or lesions      LABS:  Reviewed          RADIOLOGY & ADDITIONAL TESTS:  Reviewed     DATE OF SERVICE: 02-02-23 @ 07:36    Patient is a 52y old  Female who presents with a chief complaint of behavioral change; abnormal TFT (01 Feb 2023 07:36)      SUBJECTIVE / OVERNIGHT EVENTS:  No acute events overnight  Afebrile, hemodynamically stable  No new complaints    MEDICATIONS  (STANDING):  heparin   Injectable 5000 Unit(s) SubCutaneous every 8 hours  levothyroxine 125 MICROGram(s) Oral daily  risperiDONE   Tablet 1 milliGRAM(s) Oral <User Schedule>  risperiDONE   Tablet 1 milliGRAM(s) Oral <User Schedule>    MEDICATIONS  (PRN):  acetaminophen     Tablet .. 650 milliGRAM(s) Oral every 6 hours PRN Temp greater or equal to 38C (100.4F), Mild Pain (1 - 3)  haloperidol    Injectable 2 milliGRAM(s) IV Push every 6 hours PRN Agitation      Vital Signs Last 24 Hrs  T(C): 36.3 (02 Feb 2023 05:34), Max: 36.9 (01 Feb 2023 21:55)  T(F): 97.3 (02 Feb 2023 05:34), Max: 98.5 (01 Feb 2023 21:55)  HR: 94 (02 Feb 2023 05:34) (88 - 107)  BP: 126/73 (02 Feb 2023 05:34) (126/73 - 138/86)  BP(mean): --  RR: 17 (02 Feb 2023 05:34) (17 - 18)  SpO2: 100% (02 Feb 2023 05:34) (99% - 100%)    Parameters below as of 02 Feb 2023 05:34  Patient On (Oxygen Delivery Method): room air      CAPILLARY BLOOD GLUCOSE        I&O's Summary    01 Feb 2023 07:01  -  02 Feb 2023 07:00  --------------------------------------------------------  IN: 1100 mL / OUT: 0 mL / NET: 1100 mL        PHYSICAL EXAM:  GENERAL: No apparent distress  EYES: sclera clear  CHEST/LUNG: Clear to auscultation bilaterally; No wheeze, rales, rhonchi  HEART: Regular rate and rhythm; No murmurs, rubs, or gallops  ABDOMEN: Soft, nontender, nondistended; Bowel sounds present  EXTREMITIES:  2+ peripheral pulses; No clubbing, cyanosis, or edema  PSYCH: AAOx3, withdrawn, flat affect  NEUROLOGY: No focal deficits. Noted pill rolling tremor to R hand  SKIN: No rashes or lesions      LABS:  Reviewed          RADIOLOGY & ADDITIONAL TESTS:  Reviewed

## 2023-02-02 NOTE — PROGRESS NOTE ADULT - PROBLEM SELECTOR PLAN 1
She has bipolar disorder previously on lithium but unclear adherence prior to admission as serum levels low.  - Risperidone 1 mg bid (7 am & 8 pm)  - Haldol 2 mg IV/IM/p.o. q6h prn for agitation/aggression (last required x1 on 1/30 early am)  - QTc 430s after increasing risperidone dose  - weekly EKG  - Psychiatry following, recs appreciated She has bipolar disorder previously on lithium but unclear adherence prior to admission as serum levels low.  - Risperidone 1 mg bid (7 am & 8 pm)  - Haldol 2 mg IV/IM/p.o. q6h prn for agitation/aggression (last required x1 on 1/30 early am)  - QTc 430s after increasing risperidone dose  - weekly EKG  - start benztropine 0.5 bid, pt noted to have new parkinsonian tremor  - Psychiatry following, recs appreciated

## 2023-02-02 NOTE — PROGRESS NOTE ADULT - ATTENDING COMMENTS
52F previously domiciled with recently  mother PMH bipolar disorder, hypothyroidism BIBEMS for abnormal behavior reported by cousin. Admitted for acute encephalopathy in the setting of psychosis due to lithium nonadherence with course c/b acute renal failure and severe hypothyroidism (TSH 400s on admission).    Remains stable    C/w risperidone 1mg q 7am+ 1mg q 8PM, add Cogentin 0.5mg PO BID per psych recs  c/w synthroid 125mcg per Endo recs. Repeat TSH and Free T4 in 4-6 weeks   [id number]: 5.5, can stop metformin on DC  Labs q weekly unless clinical condition changes   Complex DC planning. Requires placement but per psych does not meet criteria for inpatient psych. 52F previously domiciled with recently  mother PMH bipolar disorder, hypothyroidism BIBEMS for abnormal behavior reported by cousin. Admitted for acute encephalopathy in the setting of psychosis due to lithium nonadherence with course c/b acute renal failure and severe hypothyroidism (TSH 400s on admission).    Remains stable    C/w risperidone 1mg q 7am+ 1mg q 8PM, add Cogentin 0.5mg PO BID per psych recs  c/w synthroid 125mcg per Endo recs. Repeat TSH and Free T4 in 4-6 weeks ~ 2/15  [id number]: 5.5, can stop metformin on DC  Labs q weekly unless clinical condition changes   Complex DC planning. Requires placement but per psych does not meet criteria for inpatient psych.

## 2023-02-03 DIAGNOSIS — J06.9 ACUTE UPPER RESPIRATORY INFECTION, UNSPECIFIED: ICD-10-CM

## 2023-02-03 LAB
ALBUMIN SERPL ELPH-MCNC: 3.7 G/DL — SIGNIFICANT CHANGE UP (ref 3.3–5)
ALP SERPL-CCNC: 98 U/L — SIGNIFICANT CHANGE UP (ref 40–120)
ALT FLD-CCNC: 16 U/L — SIGNIFICANT CHANGE UP (ref 4–33)
ANION GAP SERPL CALC-SCNC: 11 MMOL/L — SIGNIFICANT CHANGE UP (ref 7–14)
AST SERPL-CCNC: 20 U/L — SIGNIFICANT CHANGE UP (ref 4–32)
B PERT DNA SPEC QL NAA+PROBE: SIGNIFICANT CHANGE UP
B PERT+PARAPERT DNA PNL SPEC NAA+PROBE: SIGNIFICANT CHANGE UP
BASOPHILS # BLD AUTO: 0.02 K/UL — SIGNIFICANT CHANGE UP (ref 0–0.2)
BASOPHILS NFR BLD AUTO: 0.4 % — SIGNIFICANT CHANGE UP (ref 0–2)
BILIRUB SERPL-MCNC: <0.2 MG/DL — SIGNIFICANT CHANGE UP (ref 0.2–1.2)
BORDETELLA PARAPERTUSSIS (RAPRVP): SIGNIFICANT CHANGE UP
BUN SERPL-MCNC: 21 MG/DL — SIGNIFICANT CHANGE UP (ref 7–23)
C PNEUM DNA SPEC QL NAA+PROBE: SIGNIFICANT CHANGE UP
CALCIUM SERPL-MCNC: 9.2 MG/DL — SIGNIFICANT CHANGE UP (ref 8.4–10.5)
CHLORIDE SERPL-SCNC: 107 MMOL/L — SIGNIFICANT CHANGE UP (ref 98–107)
CO2 SERPL-SCNC: 22 MMOL/L — SIGNIFICANT CHANGE UP (ref 22–31)
CREAT SERPL-MCNC: 1.52 MG/DL — HIGH (ref 0.5–1.3)
EGFR: 41 ML/MIN/1.73M2 — LOW
EOSINOPHIL # BLD AUTO: 0.07 K/UL — SIGNIFICANT CHANGE UP (ref 0–0.5)
EOSINOPHIL NFR BLD AUTO: 1.4 % — SIGNIFICANT CHANGE UP (ref 0–6)
FLUAV SUBTYP SPEC NAA+PROBE: SIGNIFICANT CHANGE UP
FLUBV RNA SPEC QL NAA+PROBE: SIGNIFICANT CHANGE UP
GLUCOSE SERPL-MCNC: 83 MG/DL — SIGNIFICANT CHANGE UP (ref 70–99)
HADV DNA SPEC QL NAA+PROBE: SIGNIFICANT CHANGE UP
HCOV 229E RNA SPEC QL NAA+PROBE: SIGNIFICANT CHANGE UP
HCOV HKU1 RNA SPEC QL NAA+PROBE: SIGNIFICANT CHANGE UP
HCOV NL63 RNA SPEC QL NAA+PROBE: SIGNIFICANT CHANGE UP
HCOV OC43 RNA SPEC QL NAA+PROBE: SIGNIFICANT CHANGE UP
HCT VFR BLD CALC: 34 % — LOW (ref 34.5–45)
HGB BLD-MCNC: 10.5 G/DL — LOW (ref 11.5–15.5)
HMPV RNA SPEC QL NAA+PROBE: SIGNIFICANT CHANGE UP
HPIV1 RNA SPEC QL NAA+PROBE: SIGNIFICANT CHANGE UP
HPIV2 RNA SPEC QL NAA+PROBE: SIGNIFICANT CHANGE UP
HPIV3 RNA SPEC QL NAA+PROBE: SIGNIFICANT CHANGE UP
HPIV4 RNA SPEC QL NAA+PROBE: SIGNIFICANT CHANGE UP
IANC: 3.02 K/UL — SIGNIFICANT CHANGE UP (ref 1.8–7.4)
IMM GRANULOCYTES NFR BLD AUTO: 0.4 % — SIGNIFICANT CHANGE UP (ref 0–0.9)
LYMPHOCYTES # BLD AUTO: 0.88 K/UL — LOW (ref 1–3.3)
LYMPHOCYTES # BLD AUTO: 18.2 % — SIGNIFICANT CHANGE UP (ref 13–44)
M PNEUMO DNA SPEC QL NAA+PROBE: SIGNIFICANT CHANGE UP
MAGNESIUM SERPL-MCNC: 1.8 MG/DL — SIGNIFICANT CHANGE UP (ref 1.6–2.6)
MCHC RBC-ENTMCNC: 27.8 PG — SIGNIFICANT CHANGE UP (ref 27–34)
MCHC RBC-ENTMCNC: 30.9 GM/DL — LOW (ref 32–36)
MCV RBC AUTO: 89.9 FL — SIGNIFICANT CHANGE UP (ref 80–100)
MONOCYTES # BLD AUTO: 0.82 K/UL — SIGNIFICANT CHANGE UP (ref 0–0.9)
MONOCYTES NFR BLD AUTO: 17 % — HIGH (ref 2–14)
NEUTROPHILS # BLD AUTO: 3.02 K/UL — SIGNIFICANT CHANGE UP (ref 1.8–7.4)
NEUTROPHILS NFR BLD AUTO: 62.6 % — SIGNIFICANT CHANGE UP (ref 43–77)
NRBC # BLD: 0 /100 WBCS — SIGNIFICANT CHANGE UP (ref 0–0)
NRBC # FLD: 0 K/UL — SIGNIFICANT CHANGE UP (ref 0–0)
PHOSPHATE SERPL-MCNC: 3.6 MG/DL — SIGNIFICANT CHANGE UP (ref 2.5–4.5)
PLATELET # BLD AUTO: 180 K/UL — SIGNIFICANT CHANGE UP (ref 150–400)
POTASSIUM SERPL-MCNC: 4.2 MMOL/L — SIGNIFICANT CHANGE UP (ref 3.5–5.3)
POTASSIUM SERPL-SCNC: 4.2 MMOL/L — SIGNIFICANT CHANGE UP (ref 3.5–5.3)
PROT SERPL-MCNC: 6.7 G/DL — SIGNIFICANT CHANGE UP (ref 6–8.3)
RAPID RVP RESULT: DETECTED
RBC # BLD: 3.78 M/UL — LOW (ref 3.8–5.2)
RBC # FLD: 13.5 % — SIGNIFICANT CHANGE UP (ref 10.3–14.5)
RSV RNA SPEC QL NAA+PROBE: SIGNIFICANT CHANGE UP
RV+EV RNA SPEC QL NAA+PROBE: SIGNIFICANT CHANGE UP
SARS-COV-2 RNA SPEC QL NAA+PROBE: DETECTED
SODIUM SERPL-SCNC: 140 MMOL/L — SIGNIFICANT CHANGE UP (ref 135–145)
WBC # BLD: 4.83 K/UL — SIGNIFICANT CHANGE UP (ref 3.8–10.5)
WBC # FLD AUTO: 4.83 K/UL — SIGNIFICANT CHANGE UP (ref 3.8–10.5)

## 2023-02-03 PROCEDURE — 99232 SBSQ HOSP IP/OBS MODERATE 35: CPT | Mod: GC

## 2023-02-03 PROCEDURE — 99233 SBSQ HOSP IP/OBS HIGH 50: CPT

## 2023-02-03 RX ADMIN — HEPARIN SODIUM 5000 UNIT(S): 5000 INJECTION INTRAVENOUS; SUBCUTANEOUS at 13:09

## 2023-02-03 RX ADMIN — HEPARIN SODIUM 5000 UNIT(S): 5000 INJECTION INTRAVENOUS; SUBCUTANEOUS at 22:29

## 2023-02-03 RX ADMIN — HEPARIN SODIUM 5000 UNIT(S): 5000 INJECTION INTRAVENOUS; SUBCUTANEOUS at 06:15

## 2023-02-03 RX ADMIN — RISPERIDONE 1 MILLIGRAM(S): 4 TABLET ORAL at 06:16

## 2023-02-03 RX ADMIN — Medication 0.5 MILLIGRAM(S): at 06:16

## 2023-02-03 RX ADMIN — Medication 0.5 MILLIGRAM(S): at 17:42

## 2023-02-03 RX ADMIN — RISPERIDONE 1 MILLIGRAM(S): 4 TABLET ORAL at 20:35

## 2023-02-03 RX ADMIN — Medication 125 MICROGRAM(S): at 06:16

## 2023-02-03 NOTE — BH CONSULTATION LIAISON PROGRESS NOTE - NSBHTIMEACTIVITIESPERFORMED_PSY_A_CORE
chart review, coordination with admin, coordination with medicine team, coordination with cousin. capacity evaluation

## 2023-02-03 NOTE — PROGRESS NOTE ADULT - ATTENDING COMMENTS
52F previously domiciled with recently  mother PMH bipolar disorder, hypothyroidism BIBEMS for abnormal behavior reported by cousin. Admitted for acute encephalopathy in the setting of psychosis due to lithium nonadherence with course c/b acute renal failure and severe hypothyroidism (TSH 400s on admission).    Reports sore throat, runny nose, sneezing    RVP+ for COVID 2/3, not hypoxic. Place on airborne/contact precautions, supportive care. Does not need remdesevir or decadron presently.  C/w risperidone 1mg q 7am+ 1mg q 8PM, Cogentin 0.5mg PO BID per psych recs  c/w synthroid 125mcg per Endo recs. Repeat TSH and Free T4 in 4-6 weeks ~ 2/15  H2HS-P4D: 5.5, can stop metformin on DC  Labs q weekly unless clinical condition changes   Complex DC planning. Requires placement but per psych does not meet criteria for inpatient psych.   Patient's cousin, Armaan applying for guardianship as patient lacks insight into her disease process and does not have capacity to make medical decisions for herself.

## 2023-02-03 NOTE — PROGRESS NOTE ADULT - PROBLEM SELECTOR PLAN 5
Heparin 5000 U s.c. q.8.h.  Regular diet  Disposition: Pending placement Reportedly prescribed rosuvastatin 10 mg p.o. q.d.  - Will need outpatient lipid panel once thyroid function tests have stabilized prior to re-initiation

## 2023-02-03 NOTE — PROGRESS NOTE ADULT - SUBJECTIVE AND OBJECTIVE BOX
DATE OF SERVICE: 02-03-23 @ 07:28    Patient is a 52y old  Female who presents with a chief complaint of behavioral change; abnormal TFT (02 Feb 2023 07:36)      SUBJECTIVE / OVERNIGHT EVENTS:  No acute events overnight  Afebrile, hemodynamically stable  ***    MEDICATIONS  (STANDING):  benztropine 0.5 milliGRAM(s) Oral two times a day  heparin   Injectable 5000 Unit(s) SubCutaneous every 8 hours  levothyroxine 125 MICROGram(s) Oral daily  risperiDONE   Tablet 1 milliGRAM(s) Oral <User Schedule>  risperiDONE   Tablet 1 milliGRAM(s) Oral <User Schedule>    MEDICATIONS  (PRN):  acetaminophen     Tablet .. 650 milliGRAM(s) Oral every 6 hours PRN Temp greater or equal to 38C (100.4F), Mild Pain (1 - 3)  haloperidol    Injectable 2 milliGRAM(s) IV Push every 6 hours PRN Agitation      Vital Signs Last 24 Hrs  T(C): 36.3 (03 Feb 2023 06:23), Max: 37.2 (02 Feb 2023 21:17)  T(F): 97.4 (03 Feb 2023 06:23), Max: 98.9 (02 Feb 2023 21:17)  HR: 83 (03 Feb 2023 06:23) (83 - 97)  BP: 107/65 (03 Feb 2023 06:23) (107/65 - 128/84)  BP(mean): --  RR: 18 (03 Feb 2023 06:23) (18 - 18)  SpO2: 99% (03 Feb 2023 06:23) (99% - 99%)    Parameters below as of 03 Feb 2023 06:23  Patient On (Oxygen Delivery Method): room air      CAPILLARY BLOOD GLUCOSE        I&O's Summary      PHYSICAL EXAM:  GENERAL: No apparent distress  EYES: sclera clear  CHEST/LUNG: Clear to auscultation bilaterally; No wheeze, rales, rhonchi  HEART: Regular rate and rhythm; No murmurs, rubs, or gallops  ABDOMEN: Soft, nontender, nondistended; Bowel sounds present  EXTREMITIES:  2+ peripheral pulses; No clubbing, cyanosis, or edema  PSYCH: AAOx3, withdrawn, flat affect  NEUROLOGY: No focal deficits.  SKIN: No rashes or lesions      LABS:                        10.5   4.83  )-----------( 180      ( 03 Feb 2023 05:55 )             34.0       ***      RADIOLOGY & ADDITIONAL TESTS:  Reviewed       DATE OF SERVICE: 02-03-23 @ 07:28    Patient is a 52y old  Female who presents with a chief complaint of behavioral change; abnormal TFT (02 Feb 2023 07:36)      SUBJECTIVE / OVERNIGHT EVENTS:  No acute events overnight  Afebrile, hemodynamically stable  Reports mild cough & nasal congestion. States this has been going on the past 2-3 days. Denies sore throat, chest pain, shortness of breath, fever/chills, n/v/d.     MEDICATIONS  (STANDING):  benztropine 0.5 milliGRAM(s) Oral two times a day  heparin   Injectable 5000 Unit(s) SubCutaneous every 8 hours  levothyroxine 125 MICROGram(s) Oral daily  risperiDONE   Tablet 1 milliGRAM(s) Oral <User Schedule>  risperiDONE   Tablet 1 milliGRAM(s) Oral <User Schedule>    MEDICATIONS  (PRN):  acetaminophen     Tablet .. 650 milliGRAM(s) Oral every 6 hours PRN Temp greater or equal to 38C (100.4F), Mild Pain (1 - 3)  haloperidol    Injectable 2 milliGRAM(s) IV Push every 6 hours PRN Agitation      Vital Signs Last 24 Hrs  T(C): 36.3 (03 Feb 2023 06:23), Max: 37.2 (02 Feb 2023 21:17)  T(F): 97.4 (03 Feb 2023 06:23), Max: 98.9 (02 Feb 2023 21:17)  HR: 83 (03 Feb 2023 06:23) (83 - 97)  BP: 107/65 (03 Feb 2023 06:23) (107/65 - 128/84)  BP(mean): --  RR: 18 (03 Feb 2023 06:23) (18 - 18)  SpO2: 99% (03 Feb 2023 06:23) (99% - 99%)    Parameters below as of 03 Feb 2023 06:23  Patient On (Oxygen Delivery Method): room air      PHYSICAL EXAM:  GENERAL: No apparent distress  EYES: sclera clear  CHEST/LUNG: Clear to auscultation bilaterally; No wheeze, rales, rhonchi  HEART: Regular rate and rhythm; No murmurs, rubs, or gallops  ABDOMEN: Soft, nontender, nondistended; Bowel sounds present  EXTREMITIES:  2+ peripheral pulses; No clubbing, cyanosis, or edema  PSYCH: AAOx3, withdrawn, flat affect  NEUROLOGY: No focal deficits.  SKIN: No rashes or lesions      LABS:                        10.5   4.83  )-----------( 180      ( 03 Feb 2023 05:55 )             34.0       02-03    140  |  107  |  21  ----------------------------<  83  4.2   |  22  |  1.52<H>    Ca    9.2      03 Feb 2023 05:55  Phos  3.6     02-03  Mg     1.80     02-03    TPro  6.7  /  Alb  3.7  /  TBili  <0.2  /  DBili  x   /  AST  20  /  ALT  16  /  AlkPhos  98  02-03        RADIOLOGY & ADDITIONAL TESTS:  Reviewed

## 2023-02-03 NOTE — PROGRESS NOTE ADULT - PROBLEM SELECTOR PLAN 1
She has bipolar disorder previously on lithium but unclear adherence prior to admission as serum levels low.  - Risperidone 1 mg bid (7 am & 8 pm)  - Haldol 2 mg IV/IM/p.o. q6h prn for agitation/aggression (last required x1 on 1/30 early am)  - QTc 430s after increasing risperidone dose  - weekly EKG  - benztropine 0.5 bid  - Psychiatry following, recs appreciated

## 2023-02-03 NOTE — PROGRESS NOTE ADULT - PROBLEM SELECTOR PROBLEM 6
Addended by: BRANDON PASTRANA on: 5/21/2020 05:50 PM     Modules accepted: Orders     Need for prophylactic measure

## 2023-02-03 NOTE — PROGRESS NOTE ADULT - PROBLEM SELECTOR PLAN 4
Reportedly prescribed rosuvastatin 10 mg p.o. q.d.  - Will need outpatient lipid panel once thyroid function tests have stabilized prior to re-initiation In the setting of her diabetes. A1c now within normal range.  - BMP weekly  - remains at baseline SCr  - d/c metformin on discharge

## 2023-02-03 NOTE — BH CONSULTATION LIAISON PROGRESS NOTE - OTHER
denies variable to poor denies any ah or vh or delusions when asked  concrete, with improvement variable

## 2023-02-03 NOTE — PROGRESS NOTE ADULT - PROBLEM SELECTOR PLAN 3
In the setting of her diabetes. A1c now within normal range.  - BMP weekly  - d/c metformin on discharge - new symptoms of cough/nasal congestion that developed during admission. No fevers  - check RVP  - guaifenesin prn for cough  - symptom mgmt

## 2023-02-04 DIAGNOSIS — U07.1 COVID-19: ICD-10-CM

## 2023-02-04 PROCEDURE — 99233 SBSQ HOSP IP/OBS HIGH 50: CPT

## 2023-02-04 RX ADMIN — HEPARIN SODIUM 5000 UNIT(S): 5000 INJECTION INTRAVENOUS; SUBCUTANEOUS at 21:07

## 2023-02-04 RX ADMIN — Medication 125 MICROGRAM(S): at 06:30

## 2023-02-04 RX ADMIN — Medication 0.5 MILLIGRAM(S): at 17:01

## 2023-02-04 RX ADMIN — Medication 100 MILLIGRAM(S): at 21:07

## 2023-02-04 RX ADMIN — HEPARIN SODIUM 5000 UNIT(S): 5000 INJECTION INTRAVENOUS; SUBCUTANEOUS at 13:00

## 2023-02-04 RX ADMIN — Medication 0.5 MILLIGRAM(S): at 06:29

## 2023-02-04 RX ADMIN — RISPERIDONE 1 MILLIGRAM(S): 4 TABLET ORAL at 06:30

## 2023-02-04 RX ADMIN — HEPARIN SODIUM 5000 UNIT(S): 5000 INJECTION INTRAVENOUS; SUBCUTANEOUS at 06:30

## 2023-02-04 RX ADMIN — RISPERIDONE 1 MILLIGRAM(S): 4 TABLET ORAL at 21:07

## 2023-02-04 NOTE — PROGRESS NOTE ADULT - ATTENDING COMMENTS
Seen and examined by me this afternoon, doing well, about to have lunch at time of visit, no complaints  No SOB or cough, saturating well on RA, no runny nose  C/w airborne/contact isolation due to positive covid  Does not need remdesevir or decadron at this time  C/w psych meds  C/w synthroid for hypothyroidism per Endocrine recs  Stable CKD stage 3  Requires placement but per psych does not meet criteria for inpatient psych  Patient's cousin, Armaan applying for guardianship as patient lacks insight into her disease process and does not have capacity to make medical decisions for herself  Rest as above

## 2023-02-04 NOTE — PROGRESS NOTE ADULT - PROBLEM SELECTOR PLAN 4
In the setting of her diabetes. A1c now within normal range.  - BMP weekly  - remains at baseline SCr  - d/c metformin on discharge

## 2023-02-04 NOTE — PROGRESS NOTE ADULT - PROBLEM SELECTOR PLAN 5
Mild Cough, no hypoxia.   Decided against remdesivir i/s/o CKD and mild infection  - continue to monitor for now  - Robitussin for cough

## 2023-02-04 NOTE — PROGRESS NOTE ADULT - PROBLEM SELECTOR PLAN 3
- new symptoms of cough/nasal congestion that developed during admission. No fevers  - check RVP  - guaifenesin prn for cough  - symptom mgmt

## 2023-02-05 PROCEDURE — 99233 SBSQ HOSP IP/OBS HIGH 50: CPT | Mod: GC

## 2023-02-05 RX ADMIN — HEPARIN SODIUM 5000 UNIT(S): 5000 INJECTION INTRAVENOUS; SUBCUTANEOUS at 05:25

## 2023-02-05 RX ADMIN — Medication 0.5 MILLIGRAM(S): at 05:25

## 2023-02-05 RX ADMIN — Medication 125 MICROGRAM(S): at 05:25

## 2023-02-05 RX ADMIN — Medication 0.5 MILLIGRAM(S): at 18:52

## 2023-02-05 RX ADMIN — HEPARIN SODIUM 5000 UNIT(S): 5000 INJECTION INTRAVENOUS; SUBCUTANEOUS at 21:31

## 2023-02-05 RX ADMIN — RISPERIDONE 1 MILLIGRAM(S): 4 TABLET ORAL at 20:09

## 2023-02-05 RX ADMIN — Medication 100 MILLIGRAM(S): at 05:24

## 2023-02-05 RX ADMIN — RISPERIDONE 1 MILLIGRAM(S): 4 TABLET ORAL at 06:30

## 2023-02-05 RX ADMIN — HEPARIN SODIUM 5000 UNIT(S): 5000 INJECTION INTRAVENOUS; SUBCUTANEOUS at 14:23

## 2023-02-05 NOTE — PROGRESS NOTE ADULT - PROBLEM SELECTOR PLAN 2
Mild Cough, no hypoxia. No fevers. Tested positive for COVID on 2/3   Decided against remdesivir i/s/o CKD and mild infection  - continue to monitor for now  - Robitussin for cough  - isolation precautions

## 2023-02-05 NOTE — PROGRESS NOTE ADULT - SUBJECTIVE AND OBJECTIVE BOX
DATE OF SERVICE: 02-05-23 @ 07:20    Patient is a 53y old  Female who presents with a chief complaint of behavioral change; abnormal TFT (04 Feb 2023 07:32)      SUBJECTIVE / OVERNIGHT EVENTS:  No acute events overnight  Afebrile, hemodynamically stable SpO2 100% on room air  ***    MEDICATIONS  (STANDING):  benztropine 0.5 milliGRAM(s) Oral two times a day  heparin   Injectable 5000 Unit(s) SubCutaneous every 8 hours  levothyroxine 125 MICROGram(s) Oral daily  risperiDONE   Tablet 1 milliGRAM(s) Oral <User Schedule>  risperiDONE   Tablet 1 milliGRAM(s) Oral <User Schedule>    MEDICATIONS  (PRN):  acetaminophen     Tablet .. 650 milliGRAM(s) Oral every 6 hours PRN Temp greater or equal to 38C (100.4F), Mild Pain (1 - 3)  guaiFENesin Oral Liquid (Sugar-Free) 100 milliGRAM(s) Oral every 6 hours PRN Cough  haloperidol    Injectable 2 milliGRAM(s) IV Push every 6 hours PRN Agitation      Vital Signs Last 24 Hrs  T(C): 36.3 (05 Feb 2023 05:14), Max: 36.7 (04 Feb 2023 14:51)  T(F): 97.3 (05 Feb 2023 05:14), Max: 98 (04 Feb 2023 14:51)  HR: 82 (05 Feb 2023 05:14) (70 - 86)  BP: 123/86 (05 Feb 2023 05:14) (112/80 - 124/75)  BP(mean): --  RR: 17 (05 Feb 2023 05:14) (16 - 17)  SpO2: 100% (05 Feb 2023 05:14) (100% - 100%)    Parameters below as of 05 Feb 2023 05:14  Patient On (Oxygen Delivery Method): room air        I&O's Summary    04 Feb 2023 07:01  -  05 Feb 2023 07:00  --------------------------------------------------------  IN: 0 mL / OUT: 1950 mL / NET: -1950 mL        PHYSICAL EXAM:  GENERAL: No apparent distress  EYES: sclera clear  CHEST/LUNG: Clear to auscultation bilaterally; No wheeze, rales, rhonchi  HEART: Regular rate and rhythm; No murmurs, rubs, or gallops  ABDOMEN: Soft, nontender, nondistended; Bowel sounds present  EXTREMITIES:  2+ peripheral pulses; No clubbing, cyanosis, or edema  PSYCH: AAOx3, withdrawn, flat affect  NEUROLOGY: No focal deficits.  SKIN: No rashes or lesions      LABS:  Reviewed      RADIOLOGY & ADDITIONAL TESTS:  Reviewed   DATE OF SERVICE: 02-05-23 @ 07:20    Patient is a 53y old  Female who presents with a chief complaint of behavioral change; abnormal TFT (04 Feb 2023 07:32)      SUBJECTIVE / OVERNIGHT EVENTS:  No acute events overnight  Afebrile, hemodynamically stable SpO2 100% on room air  No new complaints. Reports feeling mildly anxious. States that the robitussin improves her cough. Denies shortness of breath, fever, chills, headache.    MEDICATIONS  (STANDING):  benztropine 0.5 milliGRAM(s) Oral two times a day  heparin   Injectable 5000 Unit(s) SubCutaneous every 8 hours  levothyroxine 125 MICROGram(s) Oral daily  risperiDONE   Tablet 1 milliGRAM(s) Oral <User Schedule>  risperiDONE   Tablet 1 milliGRAM(s) Oral <User Schedule>    MEDICATIONS  (PRN):  acetaminophen     Tablet .. 650 milliGRAM(s) Oral every 6 hours PRN Temp greater or equal to 38C (100.4F), Mild Pain (1 - 3)  guaiFENesin Oral Liquid (Sugar-Free) 100 milliGRAM(s) Oral every 6 hours PRN Cough  haloperidol    Injectable 2 milliGRAM(s) IV Push every 6 hours PRN Agitation      Vital Signs Last 24 Hrs  T(C): 36.3 (05 Feb 2023 05:14), Max: 36.7 (04 Feb 2023 14:51)  T(F): 97.3 (05 Feb 2023 05:14), Max: 98 (04 Feb 2023 14:51)  HR: 82 (05 Feb 2023 05:14) (70 - 86)  BP: 123/86 (05 Feb 2023 05:14) (112/80 - 124/75)  BP(mean): --  RR: 17 (05 Feb 2023 05:14) (16 - 17)  SpO2: 100% (05 Feb 2023 05:14) (100% - 100%)    Parameters below as of 05 Feb 2023 05:14  Patient On (Oxygen Delivery Method): room air        I&O's Summary    04 Feb 2023 07:01  -  05 Feb 2023 07:00  --------------------------------------------------------  IN: 0 mL / OUT: 1950 mL / NET: -1950 mL        PHYSICAL EXAM:  GENERAL: No apparent distress  EYES: sclera clear  CHEST/LUNG: Clear to auscultation bilaterally; No wheeze, rales, rhonchi  HEART: Regular rate and rhythm; No murmurs, rubs, or gallops  ABDOMEN: Soft, nontender, nondistended; Bowel sounds present  EXTREMITIES:  2+ peripheral pulses; No clubbing, cyanosis, or edema  PSYCH: AAOx3, withdrawn, flat affect  NEUROLOGY: No focal deficits. Slight pill rolling tremor to R hand, suppressible  SKIN: No rashes or lesions      LABS:  Reviewed      RADIOLOGY & ADDITIONAL TESTS:  Reviewed

## 2023-02-05 NOTE — PROGRESS NOTE ADULT - ATTENDING COMMENTS
Seen and examined by me this afternoon, doing well, cough, runny nose and SOB are improving, saturating well on RA  C/w airborne/contact isolation due to positive covid  Does not need remdesevir or decadron at this time  C/w psych meds  C/w synthroid for hypothyroidism per Endocrine recs  Stable CKD stage 3  Requires placement but per psych does not meet criteria for inpatient psych  Patient's cousin, Armaan applying for guardianship as patient lacks insight into her disease process and does not have capacity to make medical decisions for herself  SW follow up tomorrow regarding guardianship application  Rest as above

## 2023-02-06 PROCEDURE — 99231 SBSQ HOSP IP/OBS SF/LOW 25: CPT | Mod: GC

## 2023-02-06 PROCEDURE — 99231 SBSQ HOSP IP/OBS SF/LOW 25: CPT

## 2023-02-06 RX ADMIN — Medication 125 MICROGRAM(S): at 06:13

## 2023-02-06 RX ADMIN — Medication 0.5 MILLIGRAM(S): at 06:13

## 2023-02-06 RX ADMIN — HEPARIN SODIUM 5000 UNIT(S): 5000 INJECTION INTRAVENOUS; SUBCUTANEOUS at 13:22

## 2023-02-06 RX ADMIN — RISPERIDONE 1 MILLIGRAM(S): 4 TABLET ORAL at 06:58

## 2023-02-06 RX ADMIN — HEPARIN SODIUM 5000 UNIT(S): 5000 INJECTION INTRAVENOUS; SUBCUTANEOUS at 06:13

## 2023-02-06 RX ADMIN — Medication 0.5 MILLIGRAM(S): at 17:33

## 2023-02-06 RX ADMIN — HEPARIN SODIUM 5000 UNIT(S): 5000 INJECTION INTRAVENOUS; SUBCUTANEOUS at 21:07

## 2023-02-06 RX ADMIN — RISPERIDONE 1 MILLIGRAM(S): 4 TABLET ORAL at 20:07

## 2023-02-06 NOTE — PROGRESS NOTE ADULT - SUBJECTIVE AND OBJECTIVE BOX
DATE OF SERVICE: 02-06-23 @ 08:09    Patient is a 53y old  Female who presents with a chief complaint of behavioral change; abnormal TFT (05 Feb 2023 07:20)      SUBJECTIVE / OVERNIGHT EVENTS:  No acute events overnight  Afebrile, hemodynamically stable  ***    MEDICATIONS  (STANDING):  benztropine 0.5 milliGRAM(s) Oral two times a day  heparin   Injectable 5000 Unit(s) SubCutaneous every 8 hours  levothyroxine 125 MICROGram(s) Oral daily  risperiDONE   Tablet 1 milliGRAM(s) Oral <User Schedule>  risperiDONE   Tablet 1 milliGRAM(s) Oral <User Schedule>    MEDICATIONS  (PRN):  acetaminophen     Tablet .. 650 milliGRAM(s) Oral every 6 hours PRN Temp greater or equal to 38C (100.4F), Mild Pain (1 - 3)  guaiFENesin Oral Liquid (Sugar-Free) 100 milliGRAM(s) Oral every 6 hours PRN Cough  haloperidol    Injectable 2 milliGRAM(s) IV Push every 6 hours PRN Agitation      Vital Signs Last 24 Hrs  T(C): 36.7 (06 Feb 2023 06:06), Max: 36.7 (06 Feb 2023 06:06)  T(F): 98 (06 Feb 2023 06:06), Max: 98 (06 Feb 2023 06:06)  HR: 86 (06 Feb 2023 06:06) (80 - 86)  BP: 115/72 (06 Feb 2023 06:06) (115/72 - 127/74)  BP(mean): --  RR: 17 (06 Feb 2023 06:06) (17 - 17)  SpO2: 99% (06 Feb 2023 06:06) (99% - 100%)    Parameters below as of 06 Feb 2023 06:06  Patient On (Oxygen Delivery Method): room air      CAPILLARY BLOOD GLUCOSE        I&O's Summary    05 Feb 2023 07:01  -  06 Feb 2023 07:00  --------------------------------------------------------  IN: 240 mL / OUT: 600 mL / NET: -360 mL        PHYSICAL EXAM:  GENERAL: No apparent distress  EYES: sclera clear  CHEST/LUNG: Clear to auscultation bilaterally; No wheeze, rales, rhonchi  HEART: Regular rate and rhythm; No murmurs, rubs, or gallops  ABDOMEN: Soft, nontender, nondistended; Bowel sounds present  EXTREMITIES:  2+ peripheral pulses; No clubbing, cyanosis, or edema  PSYCH: AAOx3, withdrawn, flat affect  NEUROLOGY: No focal deficits. Slight pill rolling tremor to R hand, suppressible  SKIN: No rashes or lesions      LABS:  Reviewed        RADIOLOGY & ADDITIONAL TESTS:  Reviewed   DATE OF SERVICE: 02-06-23 @ 08:09    Patient is a 53y old  Female who presents with a chief complaint of behavioral change; abnormal TFT (05 Feb 2023 07:20)      SUBJECTIVE / OVERNIGHT EVENTS:  No acute events overnight  Afebrile, hemodynamically stable  Feels well this am. Reports that her cough is improved by the Robitussin. Denies fever, chills, shortness of breath    MEDICATIONS  (STANDING):  benztropine 0.5 milliGRAM(s) Oral two times a day  heparin   Injectable 5000 Unit(s) SubCutaneous every 8 hours  levothyroxine 125 MICROGram(s) Oral daily  risperiDONE   Tablet 1 milliGRAM(s) Oral <User Schedule>  risperiDONE   Tablet 1 milliGRAM(s) Oral <User Schedule>    MEDICATIONS  (PRN):  acetaminophen     Tablet .. 650 milliGRAM(s) Oral every 6 hours PRN Temp greater or equal to 38C (100.4F), Mild Pain (1 - 3)  guaiFENesin Oral Liquid (Sugar-Free) 100 milliGRAM(s) Oral every 6 hours PRN Cough  haloperidol    Injectable 2 milliGRAM(s) IV Push every 6 hours PRN Agitation      Vital Signs Last 24 Hrs  T(C): 36.7 (06 Feb 2023 06:06), Max: 36.7 (06 Feb 2023 06:06)  T(F): 98 (06 Feb 2023 06:06), Max: 98 (06 Feb 2023 06:06)  HR: 86 (06 Feb 2023 06:06) (80 - 86)  BP: 115/72 (06 Feb 2023 06:06) (115/72 - 127/74)  BP(mean): --  RR: 17 (06 Feb 2023 06:06) (17 - 17)  SpO2: 99% (06 Feb 2023 06:06) (99% - 100%)    Parameters below as of 06 Feb 2023 06:06  Patient On (Oxygen Delivery Method): room air          I&O's Summary    05 Feb 2023 07:01  -  06 Feb 2023 07:00  --------------------------------------------------------  IN: 240 mL / OUT: 600 mL / NET: -360 mL        PHYSICAL EXAM:  GENERAL: No apparent distress  EYES: sclera clear  CHEST/LUNG: Clear to auscultation bilaterally; No wheeze, rales, rhonchi  HEART: Regular rate and rhythm; No murmurs, rubs, or gallops  ABDOMEN: Soft, nontender, nondistended; Bowel sounds present  EXTREMITIES:  2+ peripheral pulses; No clubbing, cyanosis, or edema  PSYCH: AAOx3, withdrawn, flat affect  NEUROLOGY: No focal deficits. No tremor noted today  SKIN: No rashes or lesions      LABS:  Reviewed        RADIOLOGY & ADDITIONAL TESTS:  Reviewed

## 2023-02-06 NOTE — PROGRESS NOTE ADULT - ATTENDING COMMENTS
52F previously domiciled with recently  mother PMH bipolar disorder, hypothyroidism BIBEMS for abnormal behavior reported by cousin. Admitted for acute encephalopathy in the setting of psychosis due to lithium nonadherence with course c/b acute renal failure and severe hypothyroidism (TSH 400s on admission). Course c/b +COVID but saturating well on RA.    Sore throat and cough improved. C/w airborne/contact isolation. Does not need remdesevir or decadron at this time  C/w psych meds  C/w synthroid for hypothyroidism  Stable CKD stage 3    Patient's cousinArmaan applying for guardianship as patient lacks insight into her disease process and does not have capacity to make medical decisions for herself

## 2023-02-06 NOTE — BH CONSULTATION LIAISON PROGRESS NOTE - OTHER
denies variable to poor variable decreased tremor from previous exam denies any ah or vh or delusions when asked  concrete, with improvement

## 2023-02-07 PROCEDURE — 99231 SBSQ HOSP IP/OBS SF/LOW 25: CPT

## 2023-02-07 PROCEDURE — 99231 SBSQ HOSP IP/OBS SF/LOW 25: CPT | Mod: GC

## 2023-02-07 RX ADMIN — RISPERIDONE 1 MILLIGRAM(S): 4 TABLET ORAL at 20:46

## 2023-02-07 RX ADMIN — Medication 0.5 MILLIGRAM(S): at 17:33

## 2023-02-07 RX ADMIN — Medication 125 MICROGRAM(S): at 05:47

## 2023-02-07 RX ADMIN — HEPARIN SODIUM 5000 UNIT(S): 5000 INJECTION INTRAVENOUS; SUBCUTANEOUS at 22:00

## 2023-02-07 RX ADMIN — RISPERIDONE 1 MILLIGRAM(S): 4 TABLET ORAL at 07:03

## 2023-02-07 RX ADMIN — Medication 0.5 MILLIGRAM(S): at 05:47

## 2023-02-07 RX ADMIN — HEPARIN SODIUM 5000 UNIT(S): 5000 INJECTION INTRAVENOUS; SUBCUTANEOUS at 13:11

## 2023-02-07 RX ADMIN — HEPARIN SODIUM 5000 UNIT(S): 5000 INJECTION INTRAVENOUS; SUBCUTANEOUS at 05:47

## 2023-02-07 NOTE — PROGRESS NOTE ADULT - ATTENDING COMMENTS
52F previously domiciled with recently  mother PMH bipolar disorder, hypothyroidism BIBEMS for abnormal behavior reported by cousin. Admitted for acute encephalopathy in the setting of psychosis due to lithium nonadherence with course c/b acute renal failure and severe hypothyroidism (TSH 400s on admission). Course c/b +COVID but saturating well on RA.    C/w cogentin and risperidone per psych recs    Patient's cousin, Armaan applying for guardianship as patient lacks insight into her disease process and does not have capacity to make medical decisions for herself. Prolonged DC planning 52F previously domiciled with recently  mother PMH bipolar disorder, hypothyroidism BIBEMS for abnormal behavior reported by cousin. Admitted for acute encephalopathy in the setting of psychosis due to lithium nonadherence with course c/b acute renal failure and severe hypothyroidism (TSH 400s on admission). Course c/b +COVID but saturating well on RA.    C/w cogentin and risperidone per psych recs  c/w synthroid 125mcg per Endo recs. Repeat TSH and Free T4 in 4-6 weeks ~ 2/15    Patient's cousinArmaan applying for guardianship as patient lacks insight into her disease process and does not have capacity to make medical decisions for herself. Prolonged DC planning

## 2023-02-07 NOTE — PROGRESS NOTE ADULT - PROBLEM SELECTOR PLAN 2
Mild Cough, no hypoxia. No fevers. Tested positive for COVID on 2/3   Decided against remdesivir i/s/o CKD and mild infection  - continue to monitor for now  - Robitussin prn for cough  - isolation precautions

## 2023-02-07 NOTE — PROGRESS NOTE ADULT - PROBLEM SELECTOR PLAN 1
She has bipolar disorder previously on lithium but unclear adherence prior to admission as serum levels low.  - Risperidone 1 mg bid (7 am & 8 pm)  - Haldol 2 mg IV/IM/p.o. q6h prn for agitation/aggression (last required x1 on 1/30)  - QTc 430s after increasing risperidone dose  - weekly EKG  - benztropine 0.5 bid  - Psychiatry following, recs appreciated

## 2023-02-07 NOTE — PROGRESS NOTE ADULT - SUBJECTIVE AND OBJECTIVE BOX
DATE OF SERVICE: 02-07-23 @ 07:33    Patient is a 53y old  Female who presents with a chief complaint of behavioral change; abnormal TFT (06 Feb 2023 08:09)      SUBJECTIVE / OVERNIGHT EVENTS:  No acute events overnight  Afebrile, hemodynamically stable  ***    MEDICATIONS  (STANDING):  benztropine 0.5 milliGRAM(s) Oral two times a day  heparin   Injectable 5000 Unit(s) SubCutaneous every 8 hours  levothyroxine 125 MICROGram(s) Oral daily  risperiDONE   Tablet 1 milliGRAM(s) Oral <User Schedule>  risperiDONE   Tablet 1 milliGRAM(s) Oral <User Schedule>    MEDICATIONS  (PRN):  acetaminophen     Tablet .. 650 milliGRAM(s) Oral every 6 hours PRN Temp greater or equal to 38C (100.4F), Mild Pain (1 - 3)  guaiFENesin Oral Liquid (Sugar-Free) 100 milliGRAM(s) Oral every 6 hours PRN Cough  haloperidol    Injectable 2 milliGRAM(s) IV Push every 6 hours PRN Agitation      Vital Signs Last 24 Hrs  T(C): 36.6 (07 Feb 2023 05:30), Max: 36.6 (06 Feb 2023 21:11)  T(F): 97.9 (07 Feb 2023 05:30), Max: 97.9 (07 Feb 2023 05:30)  HR: 88 (07 Feb 2023 05:30) (82 - 88)  BP: 150/77 (07 Feb 2023 05:30) (120/85 - 150/77)  BP(mean): --  RR: 17 (07 Feb 2023 05:30) (17 - 17)  SpO2: 99% (07 Feb 2023 05:30) (99% - 99%)    Parameters below as of 07 Feb 2023 05:30  Patient On (Oxygen Delivery Method): room air        I&O's Summary    06 Feb 2023 07:01  -  07 Feb 2023 07:00  --------------------------------------------------------  IN: 0 mL / OUT: 400 mL / NET: -400 mL        PHYSICAL EXAM:  GENERAL: No apparent distress  EYES: sclera clear  CHEST/LUNG: Clear to auscultation bilaterally; No wheeze, rales, rhonchi  HEART: Regular rate and rhythm; No murmurs, rubs, or gallops  ABDOMEN: Soft, nontender, nondistended; Bowel sounds present  EXTREMITIES:  2+ peripheral pulses; No clubbing, cyanosis, or edema  PSYCH: AAOx3, withdrawn, flat affect  NEUROLOGY: No focal deficits. No tremor noted today  SKIN: No rashes or lesions      LABS:  Reviewed          RADIOLOGY & ADDITIONAL TESTS:  Reviewed   DATE OF SERVICE: 02-07-23 @ 07:33    Patient is a 53y old  Female who presents with a chief complaint of behavioral change; abnormal TFT (06 Feb 2023 08:09)      SUBJECTIVE / OVERNIGHT EVENTS:  No acute events overnight  Afebrile, hemodynamically stable  No complaints this am. Reports mild productive cough, no hemoptysis. No fever, chills, dyspnea, chest pain    MEDICATIONS  (STANDING):  benztropine 0.5 milliGRAM(s) Oral two times a day  heparin   Injectable 5000 Unit(s) SubCutaneous every 8 hours  levothyroxine 125 MICROGram(s) Oral daily  risperiDONE   Tablet 1 milliGRAM(s) Oral <User Schedule>  risperiDONE   Tablet 1 milliGRAM(s) Oral <User Schedule>    MEDICATIONS  (PRN):  acetaminophen     Tablet .. 650 milliGRAM(s) Oral every 6 hours PRN Temp greater or equal to 38C (100.4F), Mild Pain (1 - 3)  guaiFENesin Oral Liquid (Sugar-Free) 100 milliGRAM(s) Oral every 6 hours PRN Cough  haloperidol    Injectable 2 milliGRAM(s) IV Push every 6 hours PRN Agitation      Vital Signs Last 24 Hrs  T(C): 36.6 (07 Feb 2023 05:30), Max: 36.6 (06 Feb 2023 21:11)  T(F): 97.9 (07 Feb 2023 05:30), Max: 97.9 (07 Feb 2023 05:30)  HR: 88 (07 Feb 2023 05:30) (82 - 88)  BP: 150/77 (07 Feb 2023 05:30) (120/85 - 150/77)  BP(mean): --  RR: 17 (07 Feb 2023 05:30) (17 - 17)  SpO2: 99% (07 Feb 2023 05:30) (99% - 99%)    Parameters below as of 07 Feb 2023 05:30  Patient On (Oxygen Delivery Method): room air        I&O's Summary    06 Feb 2023 07:01  -  07 Feb 2023 07:00  --------------------------------------------------------  IN: 0 mL / OUT: 400 mL / NET: -400 mL        PHYSICAL EXAM:  GENERAL: No apparent distress  EYES: sclera clear  CHEST/LUNG: Clear to auscultation bilaterally; No wheeze, rales, rhonchi  HEART: Regular rate and rhythm; No murmurs, rubs, or gallops  ABDOMEN: Soft, nontender, nondistended; Bowel sounds present  EXTREMITIES:  2+ peripheral pulses; No clubbing, cyanosis, or edema  PSYCH: AAOx3, withdrawn, flat affect  NEUROLOGY: No focal deficits. No tremor noted today  SKIN: No rashes or lesions      LABS:  Reviewed          RADIOLOGY & ADDITIONAL TESTS:  Reviewed

## 2023-02-08 PROCEDURE — 93010 ELECTROCARDIOGRAM REPORT: CPT

## 2023-02-08 PROCEDURE — 99232 SBSQ HOSP IP/OBS MODERATE 35: CPT

## 2023-02-08 PROCEDURE — 99231 SBSQ HOSP IP/OBS SF/LOW 25: CPT | Mod: GC

## 2023-02-08 RX ORDER — FLUTICASONE PROPIONATE 50 MCG
1 SPRAY, SUSPENSION NASAL
Refills: 0 | Status: DISCONTINUED | OUTPATIENT
Start: 2023-02-08 | End: 2023-02-11

## 2023-02-08 RX ADMIN — RISPERIDONE 1 MILLIGRAM(S): 4 TABLET ORAL at 21:01

## 2023-02-08 RX ADMIN — RISPERIDONE 1 MILLIGRAM(S): 4 TABLET ORAL at 06:37

## 2023-02-08 RX ADMIN — HEPARIN SODIUM 5000 UNIT(S): 5000 INJECTION INTRAVENOUS; SUBCUTANEOUS at 05:42

## 2023-02-08 RX ADMIN — HEPARIN SODIUM 5000 UNIT(S): 5000 INJECTION INTRAVENOUS; SUBCUTANEOUS at 14:18

## 2023-02-08 RX ADMIN — HEPARIN SODIUM 5000 UNIT(S): 5000 INJECTION INTRAVENOUS; SUBCUTANEOUS at 21:04

## 2023-02-08 RX ADMIN — Medication 125 MICROGRAM(S): at 05:43

## 2023-02-08 RX ADMIN — Medication 1 SPRAY(S): at 17:16

## 2023-02-08 RX ADMIN — Medication 0.5 MILLIGRAM(S): at 17:15

## 2023-02-08 RX ADMIN — Medication 0.5 MILLIGRAM(S): at 05:42

## 2023-02-08 NOTE — PROGRESS NOTE ADULT - ATTENDING COMMENTS
52F previously domiciled with recently  mother PMH bipolar disorder, hypothyroidism BIBEMS for abnormal behavior reported by cousin. Admitted for acute encephalopathy in the setting of psychosis due to lithium nonadherence with course c/b acute renal failure and severe hypothyroidism (TSH 400s on admission). Course c/b +COVID but saturating well on RA.    C/w cogentin and risperidone per psych recs  c/w synthroid 125mcg per Endo recs. Repeat TSH and Free T4 in 4-6 weeks ~ 2/15  Continue airborne/contact isolation for COVID: positive since 2/3. Can DC on isolation on     Patient's cousinArmaan applying for guardianship. Prolonged DC planning for placement

## 2023-02-08 NOTE — BH CONSULTATION LIAISON PROGRESS NOTE - OTHER
variable oddly related focuses on having a cold concrete Isotretinoin Pregnancy And Lactation Text: This medication is Pregnancy Category X and is considered extremely dangerous during pregnancy. It is unknown if it is excreted in breast milk.

## 2023-02-08 NOTE — PROGRESS NOTE ADULT - SUBJECTIVE AND OBJECTIVE BOX
DATE OF SERVICE: 02-08-23 @ 07:37    Patient is a 53y old  Female who presents with a chief complaint of behavioral change; abnormal TFT (07 Feb 2023 07:32)      SUBJECTIVE / OVERNIGHT EVENTS:  No acute events overnight  Afebrile, hemodynamically stable  ***    MEDICATIONS  (STANDING):  benztropine 0.5 milliGRAM(s) Oral two times a day  heparin   Injectable 5000 Unit(s) SubCutaneous every 8 hours  levothyroxine 125 MICROGram(s) Oral daily  risperiDONE   Tablet 1 milliGRAM(s) Oral <User Schedule>  risperiDONE   Tablet 1 milliGRAM(s) Oral <User Schedule>    MEDICATIONS  (PRN):  acetaminophen     Tablet .. 650 milliGRAM(s) Oral every 6 hours PRN Temp greater or equal to 38C (100.4F), Mild Pain (1 - 3)  guaiFENesin Oral Liquid (Sugar-Free) 100 milliGRAM(s) Oral every 6 hours PRN Cough  haloperidol    Injectable 2 milliGRAM(s) IV Push every 6 hours PRN Agitation      Vital Signs Last 24 Hrs  T(C): 36.4 (08 Feb 2023 05:33), Max: 36.5 (07 Feb 2023 13:42)  T(F): 97.5 (08 Feb 2023 05:33), Max: 97.7 (07 Feb 2023 13:42)  HR: 82 (08 Feb 2023 05:33) (80 - 97)  BP: 113/71 (08 Feb 2023 05:33) (113/71 - 153/90)  BP(mean): --  RR: 18 (08 Feb 2023 05:33) (17 - 18)  SpO2: 100% (08 Feb 2023 05:33) (98% - 100%)    Parameters below as of 08 Feb 2023 05:33  Patient On (Oxygen Delivery Method): room air        I&O's Summary    07 Feb 2023 07:01  -  08 Feb 2023 07:00  --------------------------------------------------------  IN: 600 mL / OUT: 300 mL / NET: 300 mL        PHYSICAL EXAM:  GENERAL: No apparent distress  EYES: sclera clear  CHEST/LUNG: Clear to auscultation bilaterally; No wheeze, rales, rhonchi  HEART: Regular rate and rhythm; No murmurs, rubs, or gallops  ABDOMEN: Soft, nontender, nondistended; Bowel sounds present  EXTREMITIES:  2+ peripheral pulses; No clubbing, cyanosis, or edema  PSYCH: AAOx3, withdrawn, flat affect  NEUROLOGY: No focal deficits. No tremor noted today  SKIN: No rashes or lesions      LABS:  Reviewed        RADIOLOGY & ADDITIONAL TESTS:  Reviewed     DATE OF SERVICE: 02-08-23 @ 07:37    Patient is a 53y old  Female who presents with a chief complaint of behavioral change; abnormal TFT (07 Feb 2023 07:32)      SUBJECTIVE / OVERNIGHT EVENTS:  No acute events overnight  Afebrile, hemodynamically stable  No new complaints this am. No shortness of breath, cough. Mild nasal congestion.    MEDICATIONS  (STANDING):  benztropine 0.5 milliGRAM(s) Oral two times a day  heparin   Injectable 5000 Unit(s) SubCutaneous every 8 hours  levothyroxine 125 MICROGram(s) Oral daily  risperiDONE   Tablet 1 milliGRAM(s) Oral <User Schedule>  risperiDONE   Tablet 1 milliGRAM(s) Oral <User Schedule>    MEDICATIONS  (PRN):  acetaminophen     Tablet .. 650 milliGRAM(s) Oral every 6 hours PRN Temp greater or equal to 38C (100.4F), Mild Pain (1 - 3)  guaiFENesin Oral Liquid (Sugar-Free) 100 milliGRAM(s) Oral every 6 hours PRN Cough  haloperidol    Injectable 2 milliGRAM(s) IV Push every 6 hours PRN Agitation      Vital Signs Last 24 Hrs  T(C): 36.4 (08 Feb 2023 05:33), Max: 36.5 (07 Feb 2023 13:42)  T(F): 97.5 (08 Feb 2023 05:33), Max: 97.7 (07 Feb 2023 13:42)  HR: 82 (08 Feb 2023 05:33) (80 - 97)  BP: 113/71 (08 Feb 2023 05:33) (113/71 - 153/90)  BP(mean): --  RR: 18 (08 Feb 2023 05:33) (17 - 18)  SpO2: 100% (08 Feb 2023 05:33) (98% - 100%)    Parameters below as of 08 Feb 2023 05:33  Patient On (Oxygen Delivery Method): room air        I&O's Summary    07 Feb 2023 07:01  -  08 Feb 2023 07:00  --------------------------------------------------------  IN: 600 mL / OUT: 300 mL / NET: 300 mL        PHYSICAL EXAM:  GENERAL: No apparent distress  EYES: sclera clear  CHEST/LUNG: Clear to auscultation bilaterally; No wheeze, rales, rhonchi  HEART: Regular rate and rhythm; No murmurs, rubs, or gallops  ABDOMEN: Soft, nontender, nondistended; Bowel sounds present  EXTREMITIES:  2+ peripheral pulses; No clubbing, cyanosis, or edema  PSYCH: AAOx3, withdrawn, flat affect, not distracted or anxious appearing  NEUROLOGY: No focal deficits. No tremor noted today  SKIN: No rashes or lesions      LABS:  Reviewed        RADIOLOGY & ADDITIONAL TESTS:  Reviewed

## 2023-02-08 NOTE — PROGRESS NOTE ADULT - PROBLEM SELECTOR PLAN 3
Reported to be on Synthroid 125 mcg q.d., likely also non-adherent with significant elevation in TSH. Initially presenting with some lethargy. Completed steroid taper 1/15 due to concern for adrenal insufficiency.  - Levothyroxine 125 mcg  - Will require TSH and fT4 measurement 4-6 weeks from prior (prior test 1/15)  - Endocrinology recommendations appreciated

## 2023-02-08 NOTE — PROGRESS NOTE ADULT - PROBLEM SELECTOR PLAN 2
Mild Cough, no hypoxia. No fevers. Tested positive for COVID on 2/3   Decided against remdesivir i/s/o CKD and mild infection  - continue to monitor for now  - Robitussin prn for cough  - isolation precautions Mild Cough, no hypoxia. No fevers. Tested positive for COVID on 2/3   Decided against remdesivir i/s/o CKD and mild infection  - continue to monitor for now  - Robitussin prn for cough  - flonase  - isolation precautions

## 2023-02-09 PROCEDURE — 99231 SBSQ HOSP IP/OBS SF/LOW 25: CPT | Mod: GC

## 2023-02-09 RX ADMIN — RISPERIDONE 1 MILLIGRAM(S): 4 TABLET ORAL at 05:32

## 2023-02-09 RX ADMIN — Medication 1 SPRAY(S): at 05:34

## 2023-02-09 RX ADMIN — Medication 0.5 MILLIGRAM(S): at 17:22

## 2023-02-09 RX ADMIN — Medication 0.5 MILLIGRAM(S): at 05:32

## 2023-02-09 RX ADMIN — Medication 1 SPRAY(S): at 17:22

## 2023-02-09 RX ADMIN — HEPARIN SODIUM 5000 UNIT(S): 5000 INJECTION INTRAVENOUS; SUBCUTANEOUS at 05:32

## 2023-02-09 RX ADMIN — RISPERIDONE 1 MILLIGRAM(S): 4 TABLET ORAL at 21:10

## 2023-02-09 RX ADMIN — Medication 125 MICROGRAM(S): at 05:32

## 2023-02-09 NOTE — PROGRESS NOTE ADULT - PROBLEM SELECTOR PLAN 2
Mild Cough, no hypoxia. No fevers. Tested positive for COVID on 2/3   Decided against remdesivir i/s/o CKD and mild infection  - continue to monitor for now  - Robitussin prn for cough  - flonase  - isolation precautions

## 2023-02-09 NOTE — PROGRESS NOTE ADULT - PROBLEM SELECTOR PLAN 6
DVT ppx: SQH   Diet: regular  Disposition: Pending placement DVT ppx: pt ambulates frequently & low risk, can d/c chemoprophylaxis  Diet: regular  Disposition: Pending placement

## 2023-02-09 NOTE — PROGRESS NOTE ADULT - SUBJECTIVE AND OBJECTIVE BOX
DATE OF SERVICE: 02-09-23 @ 07:32    Patient is a 53y old  Female who presents with a chief complaint of behavioral change; abnormal TFT (08 Feb 2023 07:36)      SUBJECTIVE / OVERNIGHT EVENTS:  No acute events overnight  Afebrile, hemodynamically stable  ***    MEDICATIONS  (STANDING):  benztropine 0.5 milliGRAM(s) Oral two times a day  fluticasone propionate 50 MICROgram(s)/spray Nasal Spray 1 Spray(s) Both Nostrils two times a day  heparin   Injectable 5000 Unit(s) SubCutaneous every 8 hours  levothyroxine 125 MICROGram(s) Oral daily  risperiDONE   Tablet 1 milliGRAM(s) Oral <User Schedule>  risperiDONE   Tablet 1 milliGRAM(s) Oral <User Schedule>    MEDICATIONS  (PRN):  acetaminophen     Tablet .. 650 milliGRAM(s) Oral every 6 hours PRN Temp greater or equal to 38C (100.4F), Mild Pain (1 - 3)  guaiFENesin Oral Liquid (Sugar-Free) 100 milliGRAM(s) Oral every 6 hours PRN Cough  haloperidol    Injectable 2 milliGRAM(s) IV Push every 6 hours PRN Agitation      Vital Signs Last 24 Hrs  T(C): 36.7 (09 Feb 2023 05:21), Max: 36.7 (09 Feb 2023 05:21)  T(F): 98.1 (09 Feb 2023 05:21), Max: 98.1 (09 Feb 2023 05:21)  HR: 79 (09 Feb 2023 05:21) (75 - 83)  BP: 112/62 (09 Feb 2023 05:21) (112/62 - 136/95)  BP(mean): --  RR: 17 (09 Feb 2023 05:21) (17 - 17)  SpO2: 100% (09 Feb 2023 05:21) (100% - 100%)    Parameters below as of 09 Feb 2023 05:21  Patient On (Oxygen Delivery Method): room air        PHYSICAL EXAM:  GENERAL: No apparent distress  EYES: sclera clear  CHEST/LUNG: Clear to auscultation bilaterally; No wheeze, rales, rhonchi  HEART: Regular rate and rhythm; No murmurs, rubs, or gallops  ABDOMEN: Soft, nontender, nondistended; Bowel sounds present  EXTREMITIES:  2+ peripheral pulses; No clubbing, cyanosis, or edema  PSYCH: AAOx3, withdrawn, flat affect, not distracted or anxious appearing  NEUROLOGY: No focal deficits. No tremor noted today  SKIN: No rashes or lesions      LABS:  Reviewed      RADIOLOGY & ADDITIONAL TESTS:  Reviewed   DATE OF SERVICE: 02-09-23 @ 07:32    Patient is a 53y old  Female who presents with a chief complaint of behavioral change; abnormal TFT (08 Feb 2023 07:36)      SUBJECTIVE / OVERNIGHT EVENTS:  No acute events overnight  Afebrile, hemodynamically stable  Feels fine this am. No complaints    MEDICATIONS  (STANDING):  benztropine 0.5 milliGRAM(s) Oral two times a day  fluticasone propionate 50 MICROgram(s)/spray Nasal Spray 1 Spray(s) Both Nostrils two times a day  heparin   Injectable 5000 Unit(s) SubCutaneous every 8 hours  levothyroxine 125 MICROGram(s) Oral daily  risperiDONE   Tablet 1 milliGRAM(s) Oral <User Schedule>  risperiDONE   Tablet 1 milliGRAM(s) Oral <User Schedule>    MEDICATIONS  (PRN):  acetaminophen     Tablet .. 650 milliGRAM(s) Oral every 6 hours PRN Temp greater or equal to 38C (100.4F), Mild Pain (1 - 3)  guaiFENesin Oral Liquid (Sugar-Free) 100 milliGRAM(s) Oral every 6 hours PRN Cough  haloperidol    Injectable 2 milliGRAM(s) IV Push every 6 hours PRN Agitation      Vital Signs Last 24 Hrs  T(C): 36.7 (09 Feb 2023 05:21), Max: 36.7 (09 Feb 2023 05:21)  T(F): 98.1 (09 Feb 2023 05:21), Max: 98.1 (09 Feb 2023 05:21)  HR: 79 (09 Feb 2023 05:21) (75 - 83)  BP: 112/62 (09 Feb 2023 05:21) (112/62 - 136/95)  BP(mean): --  RR: 17 (09 Feb 2023 05:21) (17 - 17)  SpO2: 100% (09 Feb 2023 05:21) (100% - 100%)    Parameters below as of 09 Feb 2023 05:21  Patient On (Oxygen Delivery Method): room air        PHYSICAL EXAM:  GENERAL: No apparent distress. Sounds less congested today  EYES: sclera clear  CHEST/LUNG: Clear to auscultation bilaterally; No wheeze, rales, rhonchi  HEART: Regular rate and rhythm; No murmurs, rubs, or gallops  ABDOMEN: Soft, nontender, nondistended; Bowel sounds present  EXTREMITIES:  2+ peripheral pulses; No clubbing, cyanosis, or edema  PSYCH: AAOx3, withdrawn, flat affect, not distracted or anxious appearing  NEUROLOGY: No focal deficits. No tremor noted today  SKIN: No rashes or lesions      LABS:  Reviewed      RADIOLOGY & ADDITIONAL TESTS:  Reviewed

## 2023-02-10 LAB
ANION GAP SERPL CALC-SCNC: 9 MMOL/L — SIGNIFICANT CHANGE UP (ref 7–14)
BLD GP AB SCN SERPL QL: NEGATIVE — SIGNIFICANT CHANGE UP
BUN SERPL-MCNC: 23 MG/DL — SIGNIFICANT CHANGE UP (ref 7–23)
CALCIUM SERPL-MCNC: 9.3 MG/DL — SIGNIFICANT CHANGE UP (ref 8.4–10.5)
CHLORIDE SERPL-SCNC: 106 MMOL/L — SIGNIFICANT CHANGE UP (ref 98–107)
CO2 SERPL-SCNC: 23 MMOL/L — SIGNIFICANT CHANGE UP (ref 22–31)
CREAT SERPL-MCNC: 1.27 MG/DL — SIGNIFICANT CHANGE UP (ref 0.5–1.3)
EGFR: 51 ML/MIN/1.73M2 — LOW
GLUCOSE SERPL-MCNC: 82 MG/DL — SIGNIFICANT CHANGE UP (ref 70–99)
HCT VFR BLD CALC: 31.9 % — LOW (ref 34.5–45)
HGB BLD-MCNC: 9.7 G/DL — LOW (ref 11.5–15.5)
MAGNESIUM SERPL-MCNC: 1.9 MG/DL — SIGNIFICANT CHANGE UP (ref 1.6–2.6)
MCHC RBC-ENTMCNC: 27.3 PG — SIGNIFICANT CHANGE UP (ref 27–34)
MCHC RBC-ENTMCNC: 30.4 GM/DL — LOW (ref 32–36)
MCV RBC AUTO: 89.9 FL — SIGNIFICANT CHANGE UP (ref 80–100)
NRBC # BLD: 0 /100 WBCS — SIGNIFICANT CHANGE UP (ref 0–0)
NRBC # FLD: 0 K/UL — SIGNIFICANT CHANGE UP (ref 0–0)
PHOSPHATE SERPL-MCNC: 3.4 MG/DL — SIGNIFICANT CHANGE UP (ref 2.5–4.5)
PLATELET # BLD AUTO: 242 K/UL — SIGNIFICANT CHANGE UP (ref 150–400)
POTASSIUM SERPL-MCNC: 4.2 MMOL/L — SIGNIFICANT CHANGE UP (ref 3.5–5.3)
POTASSIUM SERPL-SCNC: 4.2 MMOL/L — SIGNIFICANT CHANGE UP (ref 3.5–5.3)
RBC # BLD: 3.55 M/UL — LOW (ref 3.8–5.2)
RBC # FLD: 13.2 % — SIGNIFICANT CHANGE UP (ref 10.3–14.5)
RH IG SCN BLD-IMP: POSITIVE — SIGNIFICANT CHANGE UP
SODIUM SERPL-SCNC: 138 MMOL/L — SIGNIFICANT CHANGE UP (ref 135–145)
WBC # BLD: 4.69 K/UL — SIGNIFICANT CHANGE UP (ref 3.8–10.5)
WBC # FLD AUTO: 4.69 K/UL — SIGNIFICANT CHANGE UP (ref 3.8–10.5)

## 2023-02-10 PROCEDURE — 99231 SBSQ HOSP IP/OBS SF/LOW 25: CPT | Mod: GC

## 2023-02-10 RX ADMIN — Medication 0.5 MILLIGRAM(S): at 17:35

## 2023-02-10 RX ADMIN — Medication 1 SPRAY(S): at 17:35

## 2023-02-10 RX ADMIN — Medication 0.5 MILLIGRAM(S): at 06:55

## 2023-02-10 RX ADMIN — RISPERIDONE 1 MILLIGRAM(S): 4 TABLET ORAL at 06:55

## 2023-02-10 RX ADMIN — Medication 1 SPRAY(S): at 06:56

## 2023-02-10 RX ADMIN — RISPERIDONE 1 MILLIGRAM(S): 4 TABLET ORAL at 20:53

## 2023-02-10 RX ADMIN — Medication 125 MICROGRAM(S): at 06:55

## 2023-02-10 NOTE — PROGRESS NOTE ADULT - SUBJECTIVE AND OBJECTIVE BOX
DATE OF SERVICE: 02-10-23 @ 07:49    Patient is a 53y old  Female who presents with a chief complaint of behavioral change; abnormal TFT (09 Feb 2023 07:32)      SUBJECTIVE / OVERNIGHT EVENTS:  No acute events overnight  Afebrile, hemodynamically stable  ***    MEDICATIONS  (STANDING):  benztropine 0.5 milliGRAM(s) Oral two times a day  fluticasone propionate 50 MICROgram(s)/spray Nasal Spray 1 Spray(s) Both Nostrils two times a day  levothyroxine 125 MICROGram(s) Oral daily  risperiDONE   Tablet 1 milliGRAM(s) Oral <User Schedule>  risperiDONE   Tablet 1 milliGRAM(s) Oral <User Schedule>    MEDICATIONS  (PRN):  acetaminophen     Tablet .. 650 milliGRAM(s) Oral every 6 hours PRN Temp greater or equal to 38C (100.4F), Mild Pain (1 - 3)  guaiFENesin Oral Liquid (Sugar-Free) 100 milliGRAM(s) Oral every 6 hours PRN Cough  haloperidol    Injectable 2 milliGRAM(s) IV Push every 6 hours PRN Agitation      Vital Signs Last 24 Hrs  T(C): 36.4 (10 Feb 2023 05:39), Max: 36.6 (09 Feb 2023 12:36)  T(F): 97.6 (10 Feb 2023 05:39), Max: 97.9 (09 Feb 2023 12:36)  HR: 80 (10 Feb 2023 05:39) (76 - 80)  BP: 122/87 (10 Feb 2023 05:39) (122/87 - 128/87)  BP(mean): --  RR: 17 (10 Feb 2023 05:39) (17 - 18)  SpO2: 100% (10 Feb 2023 05:39) (100% - 100%)    Parameters below as of 10 Feb 2023 05:39  Patient On (Oxygen Delivery Method): room air        PHYSICAL EXAM:  GENERAL: No apparent distress  EYES: sclera clear  CHEST/LUNG: Clear to auscultation bilaterally; No wheeze, rales, rhonchi  HEART: Regular rate and rhythm; No murmurs, rubs, or gallops  ABDOMEN: Soft, nontender, nondistended; Bowel sounds present  EXTREMITIES:  2+ peripheral pulses; No clubbing, cyanosis, or edema  PSYCH: AAOx3, withdrawn, flat affect, not distracted or anxious appearing  NEUROLOGY: No focal deficits. No tremor noted today  SKIN: No rashes or lesions    LABS:                        9.7    4.69  )-----------( 242      ( 10 Feb 2023 06:30 )             31.9     02-10    138  |  106  |  23  ----------------------------<  82  4.2   |  23  |  1.27    Ca    9.3      10 Feb 2023 06:30  Phos  3.4     02-10  Mg     1.90     02-10        RADIOLOGY & ADDITIONAL TESTS:  Reviewed   DATE OF SERVICE: 02-10-23 @ 07:49    Patient is a 53y old  Female who presents with a chief complaint of behavioral change; abnormal TFT (09 Feb 2023 07:32)      SUBJECTIVE / OVERNIGHT EVENTS:  No acute events overnight  Afebrile, hemodynamically stable  Feels well this am, no complaints    MEDICATIONS  (STANDING):  benztropine 0.5 milliGRAM(s) Oral two times a day  fluticasone propionate 50 MICROgram(s)/spray Nasal Spray 1 Spray(s) Both Nostrils two times a day  levothyroxine 125 MICROGram(s) Oral daily  risperiDONE   Tablet 1 milliGRAM(s) Oral <User Schedule>  risperiDONE   Tablet 1 milliGRAM(s) Oral <User Schedule>    MEDICATIONS  (PRN):  acetaminophen     Tablet .. 650 milliGRAM(s) Oral every 6 hours PRN Temp greater or equal to 38C (100.4F), Mild Pain (1 - 3)  guaiFENesin Oral Liquid (Sugar-Free) 100 milliGRAM(s) Oral every 6 hours PRN Cough  haloperidol    Injectable 2 milliGRAM(s) IV Push every 6 hours PRN Agitation      Vital Signs Last 24 Hrs  T(C): 36.4 (10 Feb 2023 05:39), Max: 36.6 (09 Feb 2023 12:36)  T(F): 97.6 (10 Feb 2023 05:39), Max: 97.9 (09 Feb 2023 12:36)  HR: 80 (10 Feb 2023 05:39) (76 - 80)  BP: 122/87 (10 Feb 2023 05:39) (122/87 - 128/87)  BP(mean): --  RR: 17 (10 Feb 2023 05:39) (17 - 18)  SpO2: 100% (10 Feb 2023 05:39) (100% - 100%)    Parameters below as of 10 Feb 2023 05:39  Patient On (Oxygen Delivery Method): room air        PHYSICAL EXAM:  GENERAL: No apparent distress  EYES: sclera clear  CHEST/LUNG: Clear to auscultation bilaterally; No wheeze, rales, rhonchi  HEART: Regular rate and rhythm; No murmurs, rubs, or gallops  ABDOMEN: Soft, nontender, nondistended; Bowel sounds present  EXTREMITIES:  2+ peripheral pulses; No clubbing, cyanosis, or edema  PSYCH: AAOx3, parsimonious speech but less withdrawn than before, more sociable. Not distracted or anxious appearing  NEUROLOGY: No focal deficits. No tremor noted today  SKIN: No rashes or lesions    LABS:                        9.7    4.69  )-----------( 242      ( 10 Feb 2023 06:30 )             31.9     02-10    138  |  106  |  23  ----------------------------<  82  4.2   |  23  |  1.27    Ca    9.3      10 Feb 2023 06:30  Phos  3.4     02-10  Mg     1.90     02-10        RADIOLOGY & ADDITIONAL TESTS:  Reviewed

## 2023-02-10 NOTE — PROGRESS NOTE ADULT - PROBLEM SELECTOR PLAN 2
Mild Cough, no hypoxia. No fevers. Tested positive for COVID on 2/3   Decided against remdesivir i/s/o CKD and mild infection  - continue to monitor for now  - Robitussin prn for cough  - flonase  - isolation precautions until 2/14

## 2023-02-10 NOTE — PROGRESS NOTE ADULT - PROBLEM SELECTOR PLAN 6
DVT ppx: pt ambulates frequently & low risk, no need chemoppx  Diet: regular  Disposition: Pending placement

## 2023-02-10 NOTE — PROGRESS NOTE ADULT - ATTENDING COMMENTS
52F previously domiciled with recently  mother PMH bipolar disorder, hypothyroidism BIBEMS for abnormal behavior reported by cousin. Admitted for acute encephalopathy in the setting of psychosis due to lithium nonadherence with course c/b acute renal failure and severe hypothyroidism (TSH 400s on admission). Course c/b +COVID but saturating well on RA.    C/w cogentin and risperidone per psych recs  c/w synthroid 125mcg per Endo recs. Repeat TSH and Free T4 in 4-6 weeks ~ 2/15  Continue airborne/contact isolation for COVID: positive since 2/3. Can DC on isolation on   Cr at 1.2, appears to have resolved GRACY on CKD. Slow downtrend of Hgb noted but no overt GI losses. Baseline Hgb 11    Patient's cousin, Armaan applying for guardianship. Prolonged DC planning for placement

## 2023-02-11 PROCEDURE — 99231 SBSQ HOSP IP/OBS SF/LOW 25: CPT

## 2023-02-11 RX ORDER — FLUTICASONE PROPIONATE 50 MCG
1 SPRAY, SUSPENSION NASAL
Refills: 0 | Status: DISCONTINUED | OUTPATIENT
Start: 2023-02-11 | End: 2023-03-10

## 2023-02-11 RX ADMIN — Medication 0.5 MILLIGRAM(S): at 05:13

## 2023-02-11 RX ADMIN — Medication 1 SPRAY(S): at 05:12

## 2023-02-11 RX ADMIN — RISPERIDONE 1 MILLIGRAM(S): 4 TABLET ORAL at 05:13

## 2023-02-11 RX ADMIN — Medication 0.5 MILLIGRAM(S): at 17:08

## 2023-02-11 RX ADMIN — Medication 125 MICROGRAM(S): at 05:13

## 2023-02-11 RX ADMIN — RISPERIDONE 1 MILLIGRAM(S): 4 TABLET ORAL at 20:32

## 2023-02-11 NOTE — PROGRESS NOTE ADULT - SUBJECTIVE AND OBJECTIVE BOX
DATE OF SERVICE: 02-11-23 @ 07:01    Patient is a 53y old  Female who presents with a chief complaint of behavioral change; abnormal TFT (10 Feb 2023 07:49)      SUBJECTIVE / OVERNIGHT EVENTS:  No acute events overnight  Afebrile, hemodynamically stable  ***    MEDICATIONS  (STANDING):  benztropine 0.5 milliGRAM(s) Oral two times a day  fluticasone propionate 50 MICROgram(s)/spray Nasal Spray 1 Spray(s) Both Nostrils two times a day  levothyroxine 125 MICROGram(s) Oral daily  risperiDONE   Tablet 1 milliGRAM(s) Oral <User Schedule>  risperiDONE   Tablet 1 milliGRAM(s) Oral <User Schedule>    MEDICATIONS  (PRN):  acetaminophen     Tablet .. 650 milliGRAM(s) Oral every 6 hours PRN Temp greater or equal to 38C (100.4F), Mild Pain (1 - 3)  guaiFENesin Oral Liquid (Sugar-Free) 100 milliGRAM(s) Oral every 6 hours PRN Cough  haloperidol    Injectable 2 milliGRAM(s) IV Push every 6 hours PRN Agitation      Vital Signs Last 24 Hrs  T(C): 36.8 (10 Feb 2023 21:20), Max: 36.8 (10 Feb 2023 21:20)  T(F): 98.3 (10 Feb 2023 21:20), Max: 98.3 (10 Feb 2023 21:20)  HR: 79 (10 Feb 2023 21:20) (77 - 79)  BP: 124/83 (10 Feb 2023 21:20) (115/80 - 124/83)  BP(mean): --  RR: 18 (10 Feb 2023 21:20) (18 - 18)  SpO2: 100% (10 Feb 2023 21:20) (100% - 100%)    Parameters below as of 10 Feb 2023 21:20  Patient On (Oxygen Delivery Method): room air        PHYSICAL EXAM:  GENERAL: No apparent distress  EYES: sclera clear  CHEST/LUNG: Clear to auscultation bilaterally; No wheeze, rales, rhonchi  HEART: Regular rate and rhythm; No murmurs, rubs, or gallops  ABDOMEN: Soft, nontender, nondistended; Bowel sounds present  EXTREMITIES:  2+ peripheral pulses; No clubbing, cyanosis, or edema  PSYCH: AAOx3, parsimonious speech but less withdrawn than before, more sociable. Not distracted or anxious appearing  NEUROLOGY: No focal deficits. No tremor noted today  SKIN: No rashes or lesions      LABS:                        9.7    4.69  )-----------( 242      ( 10 Feb 2023 06:30 )             31.9     02-10    138  |  106  |  23  ----------------------------<  82  4.2   |  23  |  1.27    Ca    9.3      10 Feb 2023 06:30  Phos  3.4     02-10  Mg     1.90     02-10          RADIOLOGY & ADDITIONAL TESTS:  Reviewed   DATE OF SERVICE: 02-11-23 @ 07:01    Patient is a 53y old  Female who presents with a chief complaint of behavioral change; abnormal TFT (10 Feb 2023 07:49)      SUBJECTIVE / OVERNIGHT EVENTS:  No acute events overnight  Afebrile, hemodynamically stable  Feels well this am, no complaints    MEDICATIONS  (STANDING):  benztropine 0.5 milliGRAM(s) Oral two times a day  fluticasone propionate 50 MICROgram(s)/spray Nasal Spray 1 Spray(s) Both Nostrils two times a day  levothyroxine 125 MICROGram(s) Oral daily  risperiDONE   Tablet 1 milliGRAM(s) Oral <User Schedule>  risperiDONE   Tablet 1 milliGRAM(s) Oral <User Schedule>    MEDICATIONS  (PRN):  acetaminophen     Tablet .. 650 milliGRAM(s) Oral every 6 hours PRN Temp greater or equal to 38C (100.4F), Mild Pain (1 - 3)  guaiFENesin Oral Liquid (Sugar-Free) 100 milliGRAM(s) Oral every 6 hours PRN Cough  haloperidol    Injectable 2 milliGRAM(s) IV Push every 6 hours PRN Agitation      Vital Signs Last 24 Hrs  T(C): 36.8 (10 Feb 2023 21:20), Max: 36.8 (10 Feb 2023 21:20)  T(F): 98.3 (10 Feb 2023 21:20), Max: 98.3 (10 Feb 2023 21:20)  HR: 79 (10 Feb 2023 21:20) (77 - 79)  BP: 124/83 (10 Feb 2023 21:20) (115/80 - 124/83)  BP(mean): --  RR: 18 (10 Feb 2023 21:20) (18 - 18)  SpO2: 100% (10 Feb 2023 21:20) (100% - 100%)    Parameters below as of 10 Feb 2023 21:20  Patient On (Oxygen Delivery Method): room air        PHYSICAL EXAM:  GENERAL: No apparent distress  EYES: sclera clear  CHEST/LUNG: Clear to auscultation bilaterally; No wheeze, rales, rhonchi  HEART: Regular rate and rhythm; No murmurs, rubs, or gallops  ABDOMEN: Soft, nontender, nondistended; Bowel sounds present  EXTREMITIES:  2+ peripheral pulses; No clubbing, cyanosis, or edema  PSYCH: AAOx3, less withdrawn than before. Not distracted or anxious appearing  NEUROLOGY: No focal deficits. No tremor noted today  SKIN: No rashes or lesions      LABS:  Reviewed    RADIOLOGY & ADDITIONAL TESTS:  Reviewed

## 2023-02-11 NOTE — PROGRESS NOTE ADULT - ATTENDING COMMENTS
52F previously domiciled with recently  mother PMH bipolar disorder, hypothyroidism BIBEMS for abnormal behavior reported by cousin. Admitted for acute encephalopathy in the setting of psychosis due to lithium nonadherence with course c/b acute renal failure and severe hypothyroidism (TSH 400s on admission). Course c/b +COVID but saturating well on RA.    C/w cogentin and risperidone per psych recs  c/w synthroid 125mcg per Endo recs. Repeat TSH and Free T4 in 4-6 weeks ~ 2/15  Continue airborne/contact isolation for COVID: positive since 2/3. Can DC on isolation on   Cr at 1.2, appears to have resolved GRACY on CKD. Slow downtrend of Hgb noted but no overt GI losses. Baseline Hgb 11    Patient's cousin, Armaan applying for guardianship. Prolonged DC planning for placement    Discussed with Dr. Haider

## 2023-02-12 PROCEDURE — 99231 SBSQ HOSP IP/OBS SF/LOW 25: CPT

## 2023-02-12 RX ADMIN — RISPERIDONE 1 MILLIGRAM(S): 4 TABLET ORAL at 05:31

## 2023-02-12 RX ADMIN — RISPERIDONE 1 MILLIGRAM(S): 4 TABLET ORAL at 21:28

## 2023-02-12 RX ADMIN — Medication 0.5 MILLIGRAM(S): at 17:20

## 2023-02-12 RX ADMIN — Medication 0.5 MILLIGRAM(S): at 05:31

## 2023-02-12 RX ADMIN — Medication 125 MICROGRAM(S): at 05:31

## 2023-02-12 NOTE — PROGRESS NOTE ADULT - ASSESSMENT
52F with bipolar disorder, hypothyroidism, admitted for acute encephalopathy most likely in the setting of psychosis due to lithium nonadherence with course complicated by acute renal failure and hypothyroidism, now resolved. Now on risperidone and pending placement to group home. Course c/b mildly symptomatic COVID, monitored off treatment.

## 2023-02-12 NOTE — PROGRESS NOTE ADULT - SUBJECTIVE AND OBJECTIVE BOX
Patient is a 53y old  Female who presents with a chief complaint of behavioral change; abnormal TFT (11 Feb 2023 07:01)      SUBJECTIVE / OVERNIGHT EVENTS:          MEDICATIONS  (STANDING):  benztropine 0.5 milliGRAM(s) Oral two times a day  levothyroxine 125 MICROGram(s) Oral daily  risperiDONE   Tablet 1 milliGRAM(s) Oral <User Schedule>  risperiDONE   Tablet 1 milliGRAM(s) Oral <User Schedule>    MEDICATIONS  (PRN):  acetaminophen     Tablet .. 650 milliGRAM(s) Oral every 6 hours PRN Temp greater or equal to 38C (100.4F), Mild Pain (1 - 3)  fluticasone propionate 50 MICROgram(s)/spray Nasal Spray 1 Spray(s) Both Nostrils two times a day PRN Congestion  guaiFENesin Oral Liquid (Sugar-Free) 100 milliGRAM(s) Oral every 6 hours PRN Cough  haloperidol    Injectable 2 milliGRAM(s) IV Push every 6 hours PRN Agitation      Vital Signs Last 24 Hrs  T(C): 36.1 (12 Feb 2023 05:55), Max: 36.6 (11 Feb 2023 22:25)  T(F): 97 (12 Feb 2023 05:55), Max: 97.9 (11 Feb 2023 22:25)  HR: 80 (12 Feb 2023 05:55) (75 - 84)  BP: 141/78 (12 Feb 2023 05:55) (119/77 - 141/78)  BP(mean): --  RR: 17 (12 Feb 2023 05:55) (17 - 17)  SpO2: 99% (12 Feb 2023 05:55) (99% - 99%)    Parameters below as of 12 Feb 2023 05:55  Patient On (Oxygen Delivery Method): room air          PHYSICAL EXAM  GENERAL: NAD, well-developed  HEAD:  Atraumatic, Normocephalic  EYES: EOMI, PERRLA, conjunctiva and sclera clear  NECK: Supple, No JVD  CHEST/LUNG: Clear to auscultation bilaterally; No wheeze  HEART: Regular rate and rhythm; No murmurs, rubs, or gallops  ABDOMEN: Soft, Nontender, Nondistended; Bowel sounds present  EXTREMITIES:  2+ Peripheral Pulses, No clubbing, cyanosis, or edema  PSYCH: AAOx3  SKIN: No rashes or lesions    CAPILLARY BLOOD GLUCOSE        I&O's Summary      LABS:                    RADIOLOGY & ADDITIONAL TESTS:     MICROBIOLOGY:    ANTIMICROBIALS:    CONSULTS: Patient is a 53y old  Female who presents with a chief complaint of behavioral change; abnormal TFT (11 Feb 2023 07:01)      SUBJECTIVE / OVERNIGHT EVENTS:    Patient seen and examined at the bedside this am. Per nursing no acute events overnight.       MEDICATIONS  (STANDING):  benztropine 0.5 milliGRAM(s) Oral two times a day  levothyroxine 125 MICROGram(s) Oral daily  risperiDONE   Tablet 1 milliGRAM(s) Oral <User Schedule>  risperiDONE   Tablet 1 milliGRAM(s) Oral <User Schedule>    MEDICATIONS  (PRN):  acetaminophen     Tablet .. 650 milliGRAM(s) Oral every 6 hours PRN Temp greater or equal to 38C (100.4F), Mild Pain (1 - 3)  fluticasone propionate 50 MICROgram(s)/spray Nasal Spray 1 Spray(s) Both Nostrils two times a day PRN Congestion  guaiFENesin Oral Liquid (Sugar-Free) 100 milliGRAM(s) Oral every 6 hours PRN Cough  haloperidol    Injectable 2 milliGRAM(s) IV Push every 6 hours PRN Agitation      Vital Signs Last 24 Hrs  T(C): 36.1 (12 Feb 2023 05:55), Max: 36.6 (11 Feb 2023 22:25)  T(F): 97 (12 Feb 2023 05:55), Max: 97.9 (11 Feb 2023 22:25)  HR: 80 (12 Feb 2023 05:55) (75 - 84)  BP: 141/78 (12 Feb 2023 05:55) (119/77 - 141/78)  BP(mean): --  RR: 17 (12 Feb 2023 05:55) (17 - 17)  SpO2: 99% (12 Feb 2023 05:55) (99% - 99%)    Parameters below as of 12 Feb 2023 05:55  Patient On (Oxygen Delivery Method): room air          PHYSICAL EXAM  GENERAL: NAD, well-developed  HEAD:  Atraumatic, Normocephalic  EYES: EOMI, PERRLA, conjunctiva and sclera clear  NECK: Supple, No JVD  CHEST/LUNG: Clear to auscultation bilaterally; No wheeze  HEART: Regular rate and rhythm; No murmurs, rubs, or gallops  ABDOMEN: Soft, Nontender, Nondistended; Bowel sounds present  EXTREMITIES:  2+ Peripheral Pulses, No clubbing, cyanosis, or edema  PSYCH: AAOx3  SKIN: No rashes or lesions    CAPILLARY BLOOD GLUCOSE        I&O's Summary      LABS:                    RADIOLOGY & ADDITIONAL TESTS:     MICROBIOLOGY:    ANTIMICROBIALS:    CONSULTS: Patient is a 53y old  Female who presents with a chief complaint of behavioral change; abnormal TFT (11 Feb 2023 07:01)      SUBJECTIVE / OVERNIGHT EVENTS:    Patient seen and examined at the bedside this am. Per nursing no acute events overnight.       MEDICATIONS  (STANDING):  benztropine 0.5 milliGRAM(s) Oral two times a day  levothyroxine 125 MICROGram(s) Oral daily  risperiDONE   Tablet 1 milliGRAM(s) Oral <User Schedule>  risperiDONE   Tablet 1 milliGRAM(s) Oral <User Schedule>    MEDICATIONS  (PRN):  acetaminophen     Tablet .. 650 milliGRAM(s) Oral every 6 hours PRN Temp greater or equal to 38C (100.4F), Mild Pain (1 - 3)  fluticasone propionate 50 MICROgram(s)/spray Nasal Spray 1 Spray(s) Both Nostrils two times a day PRN Congestion  guaiFENesin Oral Liquid (Sugar-Free) 100 milliGRAM(s) Oral every 6 hours PRN Cough  haloperidol    Injectable 2 milliGRAM(s) IV Push every 6 hours PRN Agitation      Vital Signs Last 24 Hrs  T(C): 36.1 (12 Feb 2023 05:55), Max: 36.6 (11 Feb 2023 22:25)  T(F): 97 (12 Feb 2023 05:55), Max: 97.9 (11 Feb 2023 22:25)  HR: 80 (12 Feb 2023 05:55) (75 - 84)  BP: 141/78 (12 Feb 2023 05:55) (119/77 - 141/78)  BP(mean): --  RR: 17 (12 Feb 2023 05:55) (17 - 17)  SpO2: 99% (12 Feb 2023 05:55) (99% - 99%)    Parameters below as of 12 Feb 2023 05:55  Patient On (Oxygen Delivery Method): room air          PHYSICAL EXAM  GENERAL: No apparent distress  EYES: sclera clear  CHEST/LUNG: Clear to auscultation bilaterally; No wheeze, rales, rhonchi  HEART: Regular rate and rhythm; No murmurs, rubs, or gallops  ABDOMEN: Soft, nontender, nondistended; Bowel sounds present  EXTREMITIES:  2+ peripheral pulses; No clubbing, cyanosis, or edema  PSYCH: AAOx3, less withdrawn than before. Not distracted or anxious appearing  NEUROLOGY: No focal deficits. No tremor noted today  SKIN: No rashes or lesions    CAPILLARY BLOOD GLUCOSE        I&O's Summary      LABS:                    RADIOLOGY & ADDITIONAL TESTS:     MICROBIOLOGY:    ANTIMICROBIALS:    CONSULTS:

## 2023-02-12 NOTE — PROGRESS NOTE ADULT - PROBLEM SELECTOR PROBLEM 4
Chronic kidney disease A-T Advancement Flap Text: The defect edges were debeveled with a #15 scalpel blade.  Given the location of the defect, shape of the defect and the proximity to free margins an A-T advancement flap was deemed most appropriate.  Using a sterile surgical marker, an appropriate advancement flap was drawn incorporating the defect and placing the expected incisions within the relaxed skin tension lines where possible.    The area thus outlined was incised deep to adipose tissue with a #15 scalpel blade.  The skin margins were undermined to an appropriate distance in all directions utilizing iris scissors.

## 2023-02-12 NOTE — PROGRESS NOTE ADULT - ATTENDING COMMENTS
52F previously domiciled with recently  mother PMH bipolar disorder, hypothyroidism BIBEMS for abnormal behavior reported by cousin. Admitted for acute encephalopathy in the setting of psychosis due to lithium nonadherence with course c/b acute renal failure and severe hypothyroidism (TSH 400s on admission). Course c/b +COVID but saturating well on RA.    #Bipolar disorder w/ psychosis - C/w cogentin and risperidone per psych recs  #Hypothyroidism - c/w synthroid 125mcg per Endo recs. Repeat TSH and Free T4 in 4-6 weeks ~ 2/15  #COVID-19 - Continue airborne/contact isolation for COVID: positive since 2/3. Can DC on isolation on     Dispo: Patient's cousin, Armaan applying for guardianship. Prolonged DC planning for placement    Discussed with Dr. Little

## 2023-02-13 LAB
ANION GAP SERPL CALC-SCNC: 12 MMOL/L — SIGNIFICANT CHANGE UP (ref 7–14)
BLD GP AB SCN SERPL QL: NEGATIVE — SIGNIFICANT CHANGE UP
BUN SERPL-MCNC: 22 MG/DL — SIGNIFICANT CHANGE UP (ref 7–23)
CALCIUM SERPL-MCNC: 9 MG/DL — SIGNIFICANT CHANGE UP (ref 8.4–10.5)
CHLORIDE SERPL-SCNC: 107 MMOL/L — SIGNIFICANT CHANGE UP (ref 98–107)
CO2 SERPL-SCNC: 22 MMOL/L — SIGNIFICANT CHANGE UP (ref 22–31)
CREAT SERPL-MCNC: 1.25 MG/DL — SIGNIFICANT CHANGE UP (ref 0.5–1.3)
EGFR: 52 ML/MIN/1.73M2 — LOW
GLUCOSE SERPL-MCNC: 84 MG/DL — SIGNIFICANT CHANGE UP (ref 70–99)
HCT VFR BLD CALC: 30 % — LOW (ref 34.5–45)
HGB BLD-MCNC: 9.4 G/DL — LOW (ref 11.5–15.5)
MAGNESIUM SERPL-MCNC: 1.9 MG/DL — SIGNIFICANT CHANGE UP (ref 1.6–2.6)
MCHC RBC-ENTMCNC: 27.3 PG — SIGNIFICANT CHANGE UP (ref 27–34)
MCHC RBC-ENTMCNC: 31.3 GM/DL — LOW (ref 32–36)
MCV RBC AUTO: 87.2 FL — SIGNIFICANT CHANGE UP (ref 80–100)
NRBC # BLD: 0 /100 WBCS — SIGNIFICANT CHANGE UP (ref 0–0)
NRBC # FLD: 0 K/UL — SIGNIFICANT CHANGE UP (ref 0–0)
PHOSPHATE SERPL-MCNC: 3.8 MG/DL — SIGNIFICANT CHANGE UP (ref 2.5–4.5)
PLATELET # BLD AUTO: 241 K/UL — SIGNIFICANT CHANGE UP (ref 150–400)
POTASSIUM SERPL-MCNC: 4 MMOL/L — SIGNIFICANT CHANGE UP (ref 3.5–5.3)
POTASSIUM SERPL-SCNC: 4 MMOL/L — SIGNIFICANT CHANGE UP (ref 3.5–5.3)
RBC # BLD: 3.44 M/UL — LOW (ref 3.8–5.2)
RBC # FLD: 13.1 % — SIGNIFICANT CHANGE UP (ref 10.3–14.5)
RH IG SCN BLD-IMP: POSITIVE — SIGNIFICANT CHANGE UP
SODIUM SERPL-SCNC: 141 MMOL/L — SIGNIFICANT CHANGE UP (ref 135–145)
T4 FREE SERPL-MCNC: 1.6 NG/DL — SIGNIFICANT CHANGE UP (ref 0.9–1.8)
TSH SERPL-MCNC: 6.49 UIU/ML — HIGH (ref 0.27–4.2)
WBC # BLD: 5.42 K/UL — SIGNIFICANT CHANGE UP (ref 3.8–10.5)
WBC # FLD AUTO: 5.42 K/UL — SIGNIFICANT CHANGE UP (ref 3.8–10.5)

## 2023-02-13 PROCEDURE — 99232 SBSQ HOSP IP/OBS MODERATE 35: CPT | Mod: GC

## 2023-02-13 RX ADMIN — RISPERIDONE 1 MILLIGRAM(S): 4 TABLET ORAL at 05:11

## 2023-02-13 RX ADMIN — Medication 0.5 MILLIGRAM(S): at 05:11

## 2023-02-13 RX ADMIN — RISPERIDONE 1 MILLIGRAM(S): 4 TABLET ORAL at 21:02

## 2023-02-13 RX ADMIN — Medication 0.5 MILLIGRAM(S): at 18:26

## 2023-02-13 RX ADMIN — Medication 125 MICROGRAM(S): at 05:11

## 2023-02-13 NOTE — CHART NOTE - NSCHARTNOTEFT_GEN_A_CORE
Source: Patient [x]    Family [ ]     other [x ] Chart review    Current Diet : Diet, Regular:   Consistent Carbohydrate {Evening Snack} (CSTCHOSN)  DASH/TLC {Sodium & Cholesterol Restricted} (DASH) (01-12-23 @ 15:20) [Active]    PO intake:  % [x ]   Height (cm): 167.6 (13 Jan 2023 05:55)  Weight (kg): 61 (1/18), 59.4 (1/13), no updated weight to assess  BMI (kg/m2): 21.7 (18 Jan 2023 22:00)    Nutrition Note:   52F with bipolar disorder, hypothyroidism, admitted for acute encephalopathy most likely in the setting of psychosis due to lithium nonadherence with course complicated by acute renal failure and hypothyroidism, now resolved. Now on risperidone and pending placement to group home. Course c/b mildly symptomatic COVID, monitored off treatment, per chart.  Patient reports good appetite. As per tray observation during visit, patient is consuming 100% of lunch. As per RN flow sheet, patient is mostly consuming % of meals and has intake of 25-50% occasionally. Food preferences obtained. Patient denies any difficulty chewing/swallowing, any nausea, vomiting, diarrhea, constipation during visit. Reports last bowel movement 2/11/2023. Currently not on bowel regimen. As per flow sheet, no edema, no pressure injury noted at this time.     __________________ Pertinent Medications__________________   MEDICATIONS  (STANDING):  benztropine 0.5 milliGRAM(s) Oral two times a day  levothyroxine 125 MICROGram(s) Oral daily  risperiDONE   Tablet 1 milliGRAM(s) Oral <User Schedule>  risperiDONE   Tablet 1 milliGRAM(s) Oral <User Schedule>    MEDICATIONS  (PRN):  acetaminophen     Tablet .. 650 milliGRAM(s) Oral every 6 hours PRN Temp greater or equal to 38C (100.4F), Mild Pain (1 - 3)  fluticasone propionate 50 MICROgram(s)/spray Nasal Spray 1 Spray(s) Both Nostrils two times a day PRN Congestion  guaiFENesin Oral Liquid (Sugar-Free) 100 milliGRAM(s) Oral every 6 hours PRN Cough  haloperidol    Injectable 2 milliGRAM(s) IV Push every 6 hours PRN Agitation      __________________ Pertinent Labs__________________   02-13 Na141 mmol/L Glu 84 mg/dL K+ 4.0 mmol/L Cr  1.25 mg/dL BUN 22 mg/dL 02-13 Phos 3.8 mg/dL 01-20 Chol 292 mg/dL<H> LDL --    HDL 70 mg/dL Trig 272 mg/dL<H>      Estimated Needs:   [x ] no change since previous assessment    Previous Nutrition Diagnosis:   [x ]Inadequate Oral Intake  Nutrition Diagnosis is [ x] ongoing     Interventions:   Recommend  [x] Continue with current diet.   [x] Consider adding Glucerna 8oz 1x/day (220kcal, 10gm protein).   [x ] Consider bowel regimen PRN.   [x] Obtain weekly weight, to monitor weight trend.   [x] Encourage PO intake and honor food preferences as able.      Monitoring and Evaluation:   [x ] PO intake [x ] Tolerance to diet prescription [x ] weights [x ] follow up per protocol  [x ] other: bowel movement, skin integrity, labs.

## 2023-02-13 NOTE — PROGRESS NOTE ADULT - PROBLEM SELECTOR PLAN 2
Mild Cough, no hypoxia. No fevers. Tested positive for COVID on 2/3   Decided against remdesivir i/s/o CKD and mild infection  - continue to monitor for now  - Robitussin prn for cough  - flonase  - isolation precautions until 2/14 Mild Cough, no hypoxemia. No fevers. Tested positive for COVID on 2/3   Decided against remdesivir i/s/o CKD and mild infection  - continue to monitor for now  - Robitussin prn for cough  - flonase  - isolation precautions until 2/14

## 2023-02-13 NOTE — PROGRESS NOTE ADULT - SUBJECTIVE AND OBJECTIVE BOX
PROGRESS NOTE:   Authored by: Bal Liang M.D.   Internal Medicine PGY-1  Please Contact Via Teams    Patient is a 53y old  Female who presents with a chief complaint of behavioral change; abnormal TFT (12 Feb 2023 07:31)      SUBJECTIVE / OVERNIGHT EVENTS:  No acute events overnight.     Brief Daily Plan:    ADDITIONAL REVIEW OF SYSTEMS:  Patient denies fevers, chills, chest pain, shortness of breath, nausea, abdominal pain, diarrhea, constipation, dysuria, leg swelling, headache, light headedness.    MEDICATIONS  (STANDING):  benztropine 0.5 milliGRAM(s) Oral two times a day  levothyroxine 125 MICROGram(s) Oral daily  risperiDONE   Tablet 1 milliGRAM(s) Oral <User Schedule>  risperiDONE   Tablet 1 milliGRAM(s) Oral <User Schedule>    MEDICATIONS  (PRN):  acetaminophen     Tablet .. 650 milliGRAM(s) Oral every 6 hours PRN Temp greater or equal to 38C (100.4F), Mild Pain (1 - 3)  fluticasone propionate 50 MICROgram(s)/spray Nasal Spray 1 Spray(s) Both Nostrils two times a day PRN Congestion  guaiFENesin Oral Liquid (Sugar-Free) 100 milliGRAM(s) Oral every 6 hours PRN Cough  haloperidol    Injectable 2 milliGRAM(s) IV Push every 6 hours PRN Agitation      CAPILLARY BLOOD GLUCOSE        I&O's Summary      PHYSICAL EXAM:  Vital Signs Last 24 Hrs  T(C): 36.5 (13 Feb 2023 05:20), Max: 36.7 (12 Feb 2023 22:44)  T(F): 97.7 (13 Feb 2023 05:20), Max: 98 (12 Feb 2023 22:44)  HR: 74 (13 Feb 2023 05:20) (72 - 82)  BP: 124/64 (13 Feb 2023 05:20) (108/62 - 124/64)  BP(mean): --  RR: 16 (13 Feb 2023 05:20) (16 - 16)  SpO2: 100% (13 Feb 2023 05:20) (98% - 100%)    Parameters below as of 13 Feb 2023 05:20  Patient On (Oxygen Delivery Method): room air        CONSTITUTIONAL: NAD, well-developed  RESPIRATORY: Normal respiratory effort; lungs are clear to auscultation bilaterally  CARDIOVASCULAR: Regular rate and rhythm, normal S1 and S2, no murmur/rub/gallop; No lower extremity edema; Peripheral pulses are 2+ bilaterally  ABDOMEN: Nontender to palpation, normoactive bowel sounds, no rebound/guarding; No hepatosplenomegaly  MUSCLOSKELETAL: no clubbing or cyanosis of digits; no joint swelling or tenderness to palpation  PSYCH: A+O to person, place, and time; affect appropriate    LABS:                      Tele Reviewed:    RADIOLOGY & ADDITIONAL TESTS:  Results Reviewed:   Imaging Personally Reviewed:  Electrocardiogram Personally Reviewed: PROGRESS NOTE:   Authored by: Bal Liang M.D.   Internal Medicine PGY-1  Please Contact Via Teams    Patient is a 53y old  Female who presents with a chief complaint of behavioral change; abnormal TFT (12 Feb 2023 07:31)      SUBJECTIVE / OVERNIGHT EVENTS:  No acute events overnight. Patient this morning feeling well. She states that she is in the hospital for "health reasons" such as fatigue. She did not mention her mother. Says her cousin Armaan is "just a cousin"    Brief Daily Plan:  - Continue COVID isolation  - F/u with ocusin    MEDICATIONS  (STANDING):  benztropine 0.5 milliGRAM(s) Oral two times a day  levothyroxine 125 MICROGram(s) Oral daily  risperiDONE   Tablet 1 milliGRAM(s) Oral <User Schedule>  risperiDONE   Tablet 1 milliGRAM(s) Oral <User Schedule>    MEDICATIONS  (PRN):  acetaminophen     Tablet .. 650 milliGRAM(s) Oral every 6 hours PRN Temp greater or equal to 38C (100.4F), Mild Pain (1 - 3)  fluticasone propionate 50 MICROgram(s)/spray Nasal Spray 1 Spray(s) Both Nostrils two times a day PRN Congestion  guaiFENesin Oral Liquid (Sugar-Free) 100 milliGRAM(s) Oral every 6 hours PRN Cough  haloperidol    Injectable 2 milliGRAM(s) IV Push every 6 hours PRN Agitation      CAPILLARY BLOOD GLUCOSE        I&O's Summary      PHYSICAL EXAM:  Vital Signs Last 24 Hrs  T(C): 36.5 (13 Feb 2023 05:20), Max: 36.7 (12 Feb 2023 22:44)  T(F): 97.7 (13 Feb 2023 05:20), Max: 98 (12 Feb 2023 22:44)  HR: 74 (13 Feb 2023 05:20) (72 - 82)  BP: 124/64 (13 Feb 2023 05:20) (108/62 - 124/64)  BP(mean): --  RR: 16 (13 Feb 2023 05:20) (16 - 16)  SpO2: 100% (13 Feb 2023 05:20) (98% - 100%)    Parameters below as of 13 Feb 2023 05:20  Patient On (Oxygen Delivery Method): room air        CONSTITUTIONAL: NAD, well-developed  RESPIRATORY: Normal respiratory effort; lungs are clear to auscultation bilaterally  CARDIOVASCULAR: Regular rate and rhythm, normal S1 and S2, no murmurs  ABDOMEN: Nontender to palpation, normoactive bowel sounds, no rebound/guarding  PSYCH: No insight into her condition or why she originally came here.    LABS:                         9.4    5.42  )-----------( 241      ( 13 Feb 2023 06:45 )             30.0     02-13    141  |  107  |  22  ----------------------------<  84  4.0   |  22  |  1.25    Ca    9.0      13 Feb 2023 06:45  Phos  3.8     02-13  Mg     1.90     02-13

## 2023-02-13 NOTE — PROGRESS NOTE ADULT - PROBLEM SELECTOR PLAN 3
Reported to be on Synthroid 125 mcg q.d., likely also non-adherent with significant elevation in TSH. Initially presenting with some lethargy. Completed steroid taper 1/15 due to concern for adrenal insufficiency.  - Levothyroxine 125 mcg  - Will require TSH and fT4 measurement 4-6 weeks from prior (prior test 1/15)  - Endocrinology recommendations appreciated Reported to be on Synthroid 125 mcg q.d., likely also non-adherent, with TSH of 410. Initially presenting with some lethargy. Completed steroid taper 1/15 due to concern for adrenal insufficiency.  - Levothyroxine 125 mcg  - Repeat TSH 6.49 and fT4 1.6 on 2/13, about 1 month from after restarting synthroid.

## 2023-02-13 NOTE — PROGRESS NOTE ADULT - ATTENDING COMMENTS
52F previously domiciled with recently  mother PMH bipolar disorder, hypothyroidism BIBEMS for abnormal behavior reported by cousin. Admitted for acute encephalopathy in the setting of psychosis due to lithium nonadherence with course c/b acute renal failure and severe hypothyroidism (TSH 400s on admission). Course c/b +COVID but saturating well on RA.    Standing up and eating lunch. Reports feeling well. Last BM was 3 days ago and requesting miralax    #Bipolar disorder w/ psychosis - C/w cogentin and risperidone per psych recs  #Hypothyroidism - c/w synthroid 125mcg per Endo recs. Repeat TSH and Free T4 -->6.49 and 1.6 on . C/w current dose and repeat in 4-6 weeks  #COVID-19 - Continue airborne/contact isolation for COVID: positive since 2/3. Can DC on isolation on     Dispo: Patient's cousin, Armaan applying for guardianship. Prolonged DC planning for placement    Discussed with Dr. Liang.

## 2023-02-13 NOTE — PROGRESS NOTE ADULT - PROBLEM SELECTOR PLAN 1
She has bipolar disorder previously on lithium but unclear adherence prior to admission as serum levels low.  - Risperidone 1 mg bid (7 am & 8 pm)  - Haldol 2 mg IV/IM/p.o. q6h prn for agitation/aggression (last required x1 on 1/30)  - QTc 430s after increasing risperidone dose  - weekly EKG  - benztropine 0.5 bid  - Psychiatry following, recs appreciated She has bipolar disorder previously on lithium but unclear adherence prior to admission as serum levels low.  - Risperidone 1 mg bid (7 am & 8 pm). Benztropine 0.5 bid for EPS  - Haldol 2 mg IV/IM/p.o. q6h prn for agitation/aggression (last required x1 on 1/30)  - QTc 430s after increasing risperidone dose  - weekly EKG  - Psychiatry following, recs appreciated

## 2023-02-13 NOTE — PROGRESS NOTE ADULT - PROBLEM SELECTOR PLAN 6
DVT ppx: pt ambulates frequently & low risk, no need chemoppx  Diet: regular  Disposition: Pending placement DVT ppx: pt ambulates frequently. IMPROVE score 0. No chemical DVT proph  Diet: regular  Disposition: Pending placement. Cousin Armaan trying to apply for guardianship

## 2023-02-14 PROCEDURE — 99232 SBSQ HOSP IP/OBS MODERATE 35: CPT | Mod: GC

## 2023-02-14 PROCEDURE — 93010 ELECTROCARDIOGRAM REPORT: CPT

## 2023-02-14 RX ADMIN — Medication 0.5 MILLIGRAM(S): at 05:59

## 2023-02-14 RX ADMIN — Medication 125 MICROGRAM(S): at 05:59

## 2023-02-14 RX ADMIN — RISPERIDONE 1 MILLIGRAM(S): 4 TABLET ORAL at 06:01

## 2023-02-14 RX ADMIN — Medication 0.5 MILLIGRAM(S): at 18:37

## 2023-02-14 RX ADMIN — RISPERIDONE 1 MILLIGRAM(S): 4 TABLET ORAL at 21:05

## 2023-02-14 NOTE — PROGRESS NOTE ADULT - PROBLEM SELECTOR PLAN 1
She has bipolar disorder previously on lithium but unclear adherence prior to admission as serum levels low.  - Risperidone 1 mg bid (7 am & 8 pm). Benztropine 0.5 bid for EPS  - Haldol 2 mg IV/IM/p.o. q6h prn for agitation/aggression (last required x1 on 1/30)  - QTc 430s after increasing risperidone dose  - weekly EKG  - Psychiatry following, recs appreciated

## 2023-02-14 NOTE — PROGRESS NOTE ADULT - SUBJECTIVE AND OBJECTIVE BOX
PROGRESS NOTE:   Authored by: Bal Liang M.D.   Internal Medicine PGY-1  Please Contact Via Teams    Patient is a 53y old  Female who presents with a chief complaint of behavioral change; abnormal TFT (13 Feb 2023 06:58)      SUBJECTIVE / OVERNIGHT EVENTS:  No acute events overnight. Airborne precautions D/C.     Brief Daily Plan:    ADDITIONAL REVIEW OF SYSTEMS:  Patient denies fevers, chills, chest pain, shortness of breath, nausea, abdominal pain, diarrhea, constipation, dysuria, leg swelling, headache, light headedness.    MEDICATIONS  (STANDING):  benztropine 0.5 milliGRAM(s) Oral two times a day  levothyroxine 125 MICROGram(s) Oral daily  risperiDONE   Tablet 1 milliGRAM(s) Oral <User Schedule>  risperiDONE   Tablet 1 milliGRAM(s) Oral <User Schedule>    MEDICATIONS  (PRN):  acetaminophen     Tablet .. 650 milliGRAM(s) Oral every 6 hours PRN Temp greater or equal to 38C (100.4F), Mild Pain (1 - 3)  fluticasone propionate 50 MICROgram(s)/spray Nasal Spray 1 Spray(s) Both Nostrils two times a day PRN Congestion  guaiFENesin Oral Liquid (Sugar-Free) 100 milliGRAM(s) Oral every 6 hours PRN Cough  haloperidol    Injectable 2 milliGRAM(s) IV Push every 6 hours PRN Agitation      CAPILLARY BLOOD GLUCOSE        I&O's Summary      PHYSICAL EXAM:  Vital Signs Last 24 Hrs  T(C): 36.8 (14 Feb 2023 05:14), Max: 37.2 (13 Feb 2023 22:46)  T(F): 98.2 (14 Feb 2023 05:14), Max: 98.9 (13 Feb 2023 22:46)  HR: 89 (14 Feb 2023 05:14) (81 - 89)  BP: 108/64 (14 Feb 2023 05:14) (108/64 - 137/77)  BP(mean): --  RR: 16 (14 Feb 2023 05:14) (16 - 16)  SpO2: 100% (14 Feb 2023 05:14) (99% - 100%)    Parameters below as of 14 Feb 2023 05:14  Patient On (Oxygen Delivery Method): room air        CONSTITUTIONAL: NAD, well-developed  RESPIRATORY: Normal respiratory effort; lungs are clear to auscultation bilaterally  CARDIOVASCULAR: Regular rate and rhythm, normal S1 and S2, no murmur/rub/gallop; No lower extremity edema; Peripheral pulses are 2+ bilaterally  ABDOMEN: Nontender to palpation, normoactive bowel sounds, no rebound/guarding; No hepatosplenomegaly  MUSCLOSKELETAL: no clubbing or cyanosis of digits; no joint swelling or tenderness to palpation  PSYCH: A+O to person, place, and time; affect appropriate    LABS:                        9.4    5.42  )-----------( 241      ( 13 Feb 2023 06:45 )             30.0     02-13    141  |  107  |  22  ----------------------------<  84  4.0   |  22  |  1.25    Ca    9.0      13 Feb 2023 06:45  Phos  3.8     02-13  Mg     1.90     02-13                  Tele Reviewed:    RADIOLOGY & ADDITIONAL TESTS:  Results Reviewed:   Imaging Personally Reviewed:  Electrocardiogram Personally Reviewed: PROGRESS NOTE:   Authored by: Bal Liang M.D.   Internal Medicine PGY-1  Please Contact Via Teams    Patient is a 53y old  Female who presents with a chief complaint of behavioral change; abnormal TFT (13 Feb 2023 06:58)      SUBJECTIVE / OVERNIGHT EVENTS:  No acute events overnight. Airborne precautions D/C. Patient feeling well this morning, just complaining of tiredness.    Brief Daily Plan:  - Transfer to   - F/U with cousin Armaan.    MEDICATIONS  (STANDING):  benztropine 0.5 milliGRAM(s) Oral two times a day  levothyroxine 125 MICROGram(s) Oral daily  risperiDONE   Tablet 1 milliGRAM(s) Oral <User Schedule>  risperiDONE   Tablet 1 milliGRAM(s) Oral <User Schedule>    MEDICATIONS  (PRN):  acetaminophen     Tablet .. 650 milliGRAM(s) Oral every 6 hours PRN Temp greater or equal to 38C (100.4F), Mild Pain (1 - 3)  fluticasone propionate 50 MICROgram(s)/spray Nasal Spray 1 Spray(s) Both Nostrils two times a day PRN Congestion  guaiFENesin Oral Liquid (Sugar-Free) 100 milliGRAM(s) Oral every 6 hours PRN Cough  haloperidol    Injectable 2 milliGRAM(s) IV Push every 6 hours PRN Agitation      CAPILLARY BLOOD GLUCOSE        I&O's Summary      PHYSICAL EXAM:  Vital Signs Last 24 Hrs  T(C): 36.8 (14 Feb 2023 05:14), Max: 37.2 (13 Feb 2023 22:46)  T(F): 98.2 (14 Feb 2023 05:14), Max: 98.9 (13 Feb 2023 22:46)  HR: 89 (14 Feb 2023 05:14) (81 - 89)  BP: 108/64 (14 Feb 2023 05:14) (108/64 - 137/77)  BP(mean): --  RR: 16 (14 Feb 2023 05:14) (16 - 16)  SpO2: 100% (14 Feb 2023 05:14) (99% - 100%)    Parameters below as of 14 Feb 2023 05:14  Patient On (Oxygen Delivery Method): room air      CONSTITUTIONAL: NAD, well-developed  RESPIRATORY: Normal respiratory effort; lungs are clear to auscultation bilaterally  CARDIOVASCULAR: Regular rate and rhythm, normal S1 and S2, no murmurs  ABDOMEN: Nontender to palpation, normoactive bowel sounds, no rebound/guarding  PSYCH: No insight into her condition or why she originally came here.    LABS:                        9.4    5.42  )-----------( 241      ( 13 Feb 2023 06:45 )             30.0     02-13    141  |  107  |  22  ----------------------------<  84  4.0   |  22  |  1.25    Ca    9.0      13 Feb 2023 06:45  Phos  3.8     02-13  Mg     1.90     02-13

## 2023-02-14 NOTE — PROGRESS NOTE ADULT - ATTENDING COMMENTS
52F previously domiciled with recently  mother PMH bipolar disorder, hypothyroidism BIBEMS for abnormal behavior reported by cousin. Admitted for acute encephalopathy in the setting of psychosis due to lithium nonadherence with course c/b acute renal failure and severe hypothyroidism (TSH 400s on admission). Course c/b +COVID but saturating well on RA.    Walking around in the room. Feeling well.     #Bipolar disorder w/ psychosis - C/w cogentin and risperidone per psych recs  #Hypothyroidism - c/w synthroid 125mcg per Endo recs. Repeat TSH and Free T4 -->6.49 and 1.6 on . C/w current dose and repeat in 4-6 weeks  #COVID-19 - Continue airborne/contact isolation for COVID: positive since 2/3. Can DC on isolation on     Dispo: Patient's cousinArmaan applying for guardianship. Prolonged DC planning for placement    Discussed with Dr. Liang.

## 2023-02-14 NOTE — PROGRESS NOTE ADULT - PROBLEM SELECTOR PLAN 5
Reportedly prescribed rosuvastatin 10 mg p.o. q.d.  - Will need outpatient lipid panel once thyroid function tests have stabilized prior to re-initiation RESOLVED  Mild Cough, no hypoxemia. No fevers. Tested positive for COVID on 2/3   Decided against remdesivir i/s/o CKD and mild infection  - continue to monitor for now  - Robitussin prn for cough  - flonase  - isolation precautions D/C

## 2023-02-14 NOTE — PROGRESS NOTE ADULT - PROBLEM SELECTOR PLAN 3
Reported to be on Synthroid 125 mcg q.d., likely also non-adherent, with TSH of 410. Initially presenting with some lethargy. Completed steroid taper 1/15 due to concern for adrenal insufficiency.  - Levothyroxine 125 mcg  - Repeat TSH 6.49 and fT4 1.6 on 2/13, about 1 month from after restarting synthroid. In the setting of her diabetes. A1c now within normal range.  - BMP weekly  - remains at baseline SCr  - d/c metformin on discharge

## 2023-02-14 NOTE — PROGRESS NOTE ADULT - PROBLEM SELECTOR PLAN 6
DVT ppx: pt ambulates frequently. IMPROVE score 0. No chemical DVT proph  Diet: regular  Disposition: Pending placement. Cousin Armaan trying to apply for guardianship

## 2023-02-14 NOTE — PROGRESS NOTE ADULT - PROBLEM SELECTOR PLAN 2
Mild Cough, no hypoxemia. No fevers. Tested positive for COVID on 2/3   Decided against remdesivir i/s/o CKD and mild infection  - continue to monitor for now  - Robitussin prn for cough  - flonase  - isolation precautions until 2/14 Reported to be on Synthroid 125 mcg q.d., likely also non-adherent, with TSH of 410. Initially presenting with some lethargy. Completed steroid taper 1/15 due to concern for adrenal insufficiency.  - Levothyroxine 125 mcg  - Repeat TSH 6.49 and fT4 1.6 on 2/13, about 1 month from after restarting synthroid.

## 2023-02-15 PROCEDURE — 99231 SBSQ HOSP IP/OBS SF/LOW 25: CPT | Mod: GC

## 2023-02-15 RX ADMIN — RISPERIDONE 1 MILLIGRAM(S): 4 TABLET ORAL at 05:26

## 2023-02-15 RX ADMIN — RISPERIDONE 1 MILLIGRAM(S): 4 TABLET ORAL at 20:36

## 2023-02-15 RX ADMIN — Medication 0.5 MILLIGRAM(S): at 17:28

## 2023-02-15 RX ADMIN — Medication 125 MICROGRAM(S): at 05:26

## 2023-02-15 RX ADMIN — Medication 0.5 MILLIGRAM(S): at 05:25

## 2023-02-15 NOTE — PROGRESS NOTE ADULT - PROBLEM SELECTOR PLAN 1
She has bipolar disorder previously on lithium but unclear adherence prior to admission as serum levels low.  - Risperidone 1 mg bid (7 am & 8 pm). Benztropine 0.5 bid for EPS  - Haldol 2 mg IV/IM/p.o. q6h prn for agitation/aggression (last required x1 on 1/30)  - QTc 430s after increasing risperidone dose  - weekly EKG  - Psychiatry following, recs appreciated She has bipolar disorder previously on lithium but unclear adherence prior to admission as serum levels low.  - Risperidone 1 mg bid (7 am & 8 pm). Benztropine 0.5 bid for EPS  - Haldol 2 mg IV/IM/p.o. q6h prn for agitation/aggression (last required x1 on 1/30)  - QTc 430s after increasing risperidone dose. 2/14 436  - weekly EKG  - Psychiatry following, recs appreciated

## 2023-02-15 NOTE — PROGRESS NOTE ADULT - PROBLEM SELECTOR PLAN 5
RESOLVED  Mild Cough, no hypoxemia. No fevers. Tested positive for COVID on 2/3   Decided against remdesivir i/s/o CKD and mild infection  - continue to monitor for now  - Robitussin prn for cough  - flonase  - isolation precautions D/C

## 2023-02-15 NOTE — PROGRESS NOTE ADULT - PROBLEM SELECTOR PLAN 2
Reported to be on Synthroid 125 mcg q.d., likely also non-adherent, with TSH of 410. Initially presenting with some lethargy. Completed steroid taper 1/15 due to concern for adrenal insufficiency.  - Levothyroxine 125 mcg  - Repeat TSH 6.49 and fT4 1.6 on 2/13, about 1 month from after restarting synthroid.

## 2023-02-15 NOTE — PROGRESS NOTE ADULT - ATTENDING COMMENTS
52F previously domiciled with recently  mother PMH bipolar disorder, hypothyroidism BIBEMS for abnormal behavior reported by cousin. Admitted for acute encephalopathy in the setting of psychosis due to lithium nonadherence with course c/b acute renal failure and severe hypothyroidism (TSH 400s on admission). Course c/b +COVID but saturating well on RA.    Walking around in the room. Feeling well.     #Bipolar disorder w/ psychosis - C/w cogentin and risperidone per psych recs  #Hypothyroidism - c/w synthroid 125mcg per Endo recs. Repeat TSH and Free T4 -->6.49 and 1.6 on . C/w current dose and repeat in 4-6 weeks  #COVID-19 - Continue airborne/contact isolation for COVID: positive since 2/3. DC on isolation on     Dispo: Patient's cousinArmaan applying for guardianship. Prolonged DC planning for placement    Discussed with Dr. Liang.

## 2023-02-15 NOTE — PROGRESS NOTE ADULT - SUBJECTIVE AND OBJECTIVE BOX
PROGRESS NOTE:   Authored by: Bal Liang M.D.   Internal Medicine PGY-1  Please Contact Via Teams    Patient is a 53y old  Female who presents with a chief complaint of behavioral change; abnormal TFT (14 Feb 2023 07:05)      SUBJECTIVE / OVERNIGHT EVENTS:  No acute events overnight. Spoke to roney Crook yesterday evening. He said the hospital has applied for guardianship and he has filled out the appropriate paperwork.     Brief Daily Plan:  - Weekly EKG today    MEDICATIONS  (STANDING):  benztropine 0.5 milliGRAM(s) Oral two times a day  levothyroxine 125 MICROGram(s) Oral daily  risperiDONE   Tablet 1 milliGRAM(s) Oral <User Schedule>  risperiDONE   Tablet 1 milliGRAM(s) Oral <User Schedule>    MEDICATIONS  (PRN):  acetaminophen     Tablet .. 650 milliGRAM(s) Oral every 6 hours PRN Temp greater or equal to 38C (100.4F), Mild Pain (1 - 3)  fluticasone propionate 50 MICROgram(s)/spray Nasal Spray 1 Spray(s) Both Nostrils two times a day PRN Congestion  guaiFENesin Oral Liquid (Sugar-Free) 100 milliGRAM(s) Oral every 6 hours PRN Cough  haloperidol    Injectable 2 milliGRAM(s) IV Push every 6 hours PRN Agitation      CAPILLARY BLOOD GLUCOSE        I&O's Summary      PHYSICAL EXAM:  Vital Signs Last 24 Hrs  T(C): 36.2 (15 Feb 2023 05:01), Max: 36.5 (14 Feb 2023 13:10)  T(F): 97.1 (15 Feb 2023 05:01), Max: 97.7 (14 Feb 2023 13:10)  HR: 88 (15 Feb 2023 05:01) (84 - 89)  BP: 143/66 (15 Feb 2023 05:01) (124/84 - 143/66)  BP(mean): --  RR: 17 (15 Feb 2023 05:01) (17 - 17)  SpO2: 100% (15 Feb 2023 05:01) (97% - 100%)    Parameters below as of 15 Feb 2023 05:01  Patient On (Oxygen Delivery Method): room air        CONSTITUTIONAL: NAD, well-developed  RESPIRATORY: Normal respiratory effort; lungs are clear to auscultation bilaterally  CARDIOVASCULAR: Regular rate and rhythm, normal S1 and S2, no murmur/rub/gallop; No lower extremity edema; Peripheral pulses are 2+ bilaterally  ABDOMEN: Nontender to palpation, normoactive bowel sounds, no rebound/guarding; No hepatosplenomegaly  MUSCLOSKELETAL: no clubbing or cyanosis of digits; no joint swelling or tenderness to palpation  PSYCH: A+O to person, place, and time; affect appropriate    LABS:                      Tele Reviewed:    RADIOLOGY & ADDITIONAL TESTS:  Results Reviewed:   Imaging Personally Reviewed:  Electrocardiogram Personally Reviewed: PROGRESS NOTE:   Authored by: Bal Liang M.D.   Internal Medicine PGY-1  Please Contact Via Teams    Patient is a 53y old  Female who presents with a chief complaint of behavioral change; abnormal TFT (14 Feb 2023 07:05)      SUBJECTIVE / OVERNIGHT EVENTS:  No acute events overnight. Spoke to roney Crook yesterday evening. He said the hospital has applied for guardianship and he has filled out the appropriate paperwork.     This am patient is comfortable. Walking around and fixing things in his room.    Brief Daily Plan:  - Weekly EKG     MEDICATIONS  (STANDING):  benztropine 0.5 milliGRAM(s) Oral two times a day  levothyroxine 125 MICROGram(s) Oral daily  risperiDONE   Tablet 1 milliGRAM(s) Oral <User Schedule>  risperiDONE   Tablet 1 milliGRAM(s) Oral <User Schedule>    MEDICATIONS  (PRN):  acetaminophen     Tablet .. 650 milliGRAM(s) Oral every 6 hours PRN Temp greater or equal to 38C (100.4F), Mild Pain (1 - 3)  fluticasone propionate 50 MICROgram(s)/spray Nasal Spray 1 Spray(s) Both Nostrils two times a day PRN Congestion  guaiFENesin Oral Liquid (Sugar-Free) 100 milliGRAM(s) Oral every 6 hours PRN Cough  haloperidol    Injectable 2 milliGRAM(s) IV Push every 6 hours PRN Agitation      CAPILLARY BLOOD GLUCOSE        I&O's Summary      PHYSICAL EXAM:  Vital Signs Last 24 Hrs  T(C): 36.2 (15 Feb 2023 05:01), Max: 36.5 (14 Feb 2023 13:10)  T(F): 97.1 (15 Feb 2023 05:01), Max: 97.7 (14 Feb 2023 13:10)  HR: 88 (15 Feb 2023 05:01) (84 - 89)  BP: 143/66 (15 Feb 2023 05:01) (124/84 - 143/66)  BP(mean): --  RR: 17 (15 Feb 2023 05:01) (17 - 17)  SpO2: 100% (15 Feb 2023 05:01) (97% - 100%)    Parameters below as of 15 Feb 2023 05:01  Patient On (Oxygen Delivery Method): room air      CONSTITUTIONAL: NAD, well-developed  RESPIRATORY: Normal respiratory effort; lungs are clear to auscultation bilaterally  CARDIOVASCULAR: Regular rate and rhythm, normal S1 and S2, no murmurs  ABDOMEN: Nontender to palpation, normoactive bowel sounds, no rebound/guarding  PSYCH: No insight into her condition or why she originally came here.    LABS:              EKG: NSR, possible LVH,

## 2023-02-15 NOTE — PROGRESS NOTE ADULT - PROBLEM SELECTOR PLAN 6
DVT ppx: pt ambulates frequently. IMPROVE score 0. No chemical DVT proph  Diet: regular  Disposition: Pending placement. Cousin Armaan trying to apply for guardianship DVT ppx: pt ambulates frequently. IMPROVE score 0. No chemical DVT proph  Diet: regular  Disposition: Pending placement. Hospital has applied for guardianship.

## 2023-02-16 DIAGNOSIS — R60.9 EDEMA, UNSPECIFIED: ICD-10-CM

## 2023-02-16 PROCEDURE — 99232 SBSQ HOSP IP/OBS MODERATE 35: CPT | Mod: GC

## 2023-02-16 RX ORDER — LIDOCAINE 4 G/100G
1 CREAM TOPICAL DAILY
Refills: 0 | Status: DISCONTINUED | OUTPATIENT
Start: 2023-02-16 | End: 2023-03-10

## 2023-02-16 RX ORDER — LIDOCAINE 4 G/100G
1 CREAM TOPICAL DAILY
Refills: 0 | Status: DISCONTINUED | OUTPATIENT
Start: 2023-02-16 | End: 2023-02-16

## 2023-02-16 RX ADMIN — Medication 0.5 MILLIGRAM(S): at 17:24

## 2023-02-16 RX ADMIN — RISPERIDONE 1 MILLIGRAM(S): 4 TABLET ORAL at 20:31

## 2023-02-16 RX ADMIN — LIDOCAINE 1 PATCH: 4 CREAM TOPICAL at 13:44

## 2023-02-16 RX ADMIN — LIDOCAINE 1 PATCH: 4 CREAM TOPICAL at 20:27

## 2023-02-16 RX ADMIN — RISPERIDONE 1 MILLIGRAM(S): 4 TABLET ORAL at 06:01

## 2023-02-16 RX ADMIN — Medication 0.5 MILLIGRAM(S): at 06:02

## 2023-02-16 RX ADMIN — Medication 125 MICROGRAM(S): at 06:02

## 2023-02-16 NOTE — PROGRESS NOTE ADULT - PROBLEM SELECTOR PLAN 5
RESOLVED  Mild Cough, no hypoxemia. No fevers. Tested positive for COVID on 2/3   Decided against remdesivir i/s/o CKD and mild infection  - continue to monitor for now  - Robitussin prn for cough  - flonase  - isolation precautions D/C Reportedly prescribed rosuvastatin 10 mg p.o. q.d.  - Will need outpatient lipid panel once thyroid function tests have stabilized prior to re-initiation

## 2023-02-16 NOTE — PROGRESS NOTE ADULT - PROBLEM SELECTOR PLAN 7
DVT ppx: pt ambulates frequently. IMPROVE score 0. No chemical DVT proph  Diet: regular  Disposition: Pending placement. Hospital has applied for guardianship.

## 2023-02-16 NOTE — PROGRESS NOTE ADULT - PROBLEM SELECTOR PLAN 6
DVT ppx: pt ambulates frequently. IMPROVE score 0. No chemical DVT proph  Diet: regular  Disposition: Pending placement. Hospital has applied for guardianship. RESOLVED  Mild Cough, no hypoxemia. No fevers. Tested positive for COVID on 2/3   Decided against remdesivir i/s/o CKD and mild infection  - continue to monitor for now  - Robitussin prn for cough  - flonase  - isolation precautions D/C

## 2023-02-16 NOTE — PROGRESS NOTE ADULT - ATTENDING COMMENTS
52F previously domiciled with recently  mother PMH bipolar disorder, hypothyroidism BIBEMS for abnormal behavior reported by cousin. Admitted for acute encephalopathy in the setting of psychosis due to lithium nonadherence with course c/b acute renal failure and severe hypothyroidism (TSH 400s on admission). Course c/b +COVID but saturating well on RA.    Walking around in the room. Feeling well. Initially reports L ankle pain then later states no pain. Trace pedal edema up to the distal 1/4 LE b/l. No tenderness. Reports she has edema intermittently previously.     #Bipolar disorder w/ psychosis - C/w cogentin and risperidone per psych recs  #Hypothyroidism - c/w synthroid 125mcg per Endo recs. Repeat TSH and Free T4 -->6.49 and 1.6 on . C/w current dose and repeat in 4-6 weeks  #COVID-19 - Continue airborne/contact isolation for COVID: positive since 2/3. DC on isolation on   #Pedal edema/LE edema: no JVD noted. Vitals stable. Ambulatory. Encourage elevation of LE while sitting. Continue to monitor.     Dispo: Pending guardianship. Prolonged DC planning for placement    Discussed with Dr. Liang.

## 2023-02-16 NOTE — PROGRESS NOTE ADULT - PROBLEM SELECTOR PLAN 2
Reported to be on Synthroid 125 mcg q.d., likely also non-adherent, with TSH of 410. Initially presenting with some lethargy. Completed steroid taper 1/15 due to concern for adrenal insufficiency.  - Levothyroxine 125 mcg  - Repeat TSH 6.49 and fT4 1.6 on 2/13, about 1 month from after restarting synthroid. Reported to be on Synthroid 125 mcg q.d., likely also non-adherent, with TSH of 410. Initially presenting with some lethargy. Completed steroid taper 1/15 due to concern for adrenal insufficiency.  - Levothyroxine 125 mcg  - Repeat TSH 6.49 and fT4 1.6 on 2/13, about 1 month from after restarting synthroid. Can follow up outpatient, or if still here would recheck in 4 weeks.

## 2023-02-16 NOTE — PROGRESS NOTE ADULT - PROBLEM SELECTOR PLAN 4
Reportedly prescribed rosuvastatin 10 mg p.o. q.d.  - Will need outpatient lipid panel once thyroid function tests have stabilized prior to re-initiation - Will order echo

## 2023-02-16 NOTE — PROGRESS NOTE ADULT - PROBLEM SELECTOR PLAN 1
She has bipolar disorder previously on lithium but unclear adherence prior to admission as serum levels low.  - Risperidone 1 mg bid (7 am & 8 pm). Benztropine 0.5 bid for EPS  - Haldol 2 mg IV/IM/p.o. q6h prn for agitation/aggression (last required x1 on 1/30)  - QTc 430s after increasing risperidone dose. 2/14 436  - weekly EKG  - Psychiatry following, recs appreciated She has bipolar disorder previously on lithium but unclear adherence prior to admission as serum levels low.  - Risperidone 1 mg bid (7 am & 8 pm). Benztropine 0.5 bid for EPS  - Haldol 2 mg IV/IM/p.o. q6h prn for agitation/aggression (last required x1 on )  - QTc 430s after increasing risperidone dose.  - weekly EK/14 QTc 436  - Psychiatry following, recs appreciated

## 2023-02-16 NOTE — PROGRESS NOTE ADULT - PROBLEM SELECTOR PLAN 3
In the setting of her diabetes. A1c now within normal range.  - BMP weekly  - remains at baseline SCr  - d/c metformin on discharge In the setting of her diabetes. A1c now within normal range.  - BMP weekly  - remains at baseline SCr. CKD 3A  - d/c metformin on discharge

## 2023-02-16 NOTE — PROGRESS NOTE ADULT - SUBJECTIVE AND OBJECTIVE BOX
PROGRESS NOTE:   Authored by: Bal Liang M.D.   Internal Medicine PGY-1  Please Contact Via Teams    Patient is a 53y old  Female who presents with a chief complaint of behavioral change; abnormal TFT (15 Feb 2023 06:59)      SUBJECTIVE / OVERNIGHT EVENTS:  No acute events overnight.     Brief Daily Plan:    MEDICATIONS  (STANDING):  benztropine 0.5 milliGRAM(s) Oral two times a day  levothyroxine 125 MICROGram(s) Oral daily  risperiDONE   Tablet 1 milliGRAM(s) Oral <User Schedule>  risperiDONE   Tablet 1 milliGRAM(s) Oral <User Schedule>    MEDICATIONS  (PRN):  acetaminophen     Tablet .. 650 milliGRAM(s) Oral every 6 hours PRN Temp greater or equal to 38C (100.4F), Mild Pain (1 - 3)  fluticasone propionate 50 MICROgram(s)/spray Nasal Spray 1 Spray(s) Both Nostrils two times a day PRN Congestion  guaiFENesin Oral Liquid (Sugar-Free) 100 milliGRAM(s) Oral every 6 hours PRN Cough  haloperidol    Injectable 2 milliGRAM(s) IV Push every 6 hours PRN Agitation      CAPILLARY BLOOD GLUCOSE        I&O's Summary      PHYSICAL EXAM:  Vital Signs Last 24 Hrs  T(C): 36.4 (16 Feb 2023 05:16), Max: 36.6 (15 Feb 2023 21:25)  T(F): 97.6 (16 Feb 2023 05:16), Max: 97.9 (15 Feb 2023 21:25)  HR: 77 (16 Feb 2023 05:16) (77 - 88)  BP: 122/88 (16 Feb 2023 05:16) (122/88 - 137/82)  BP(mean): --  RR: 18 (16 Feb 2023 05:16) (18 - 18)  SpO2: 99% (16 Feb 2023 05:16) (99% - 100%)    Parameters below as of 16 Feb 2023 05:16  Patient On (Oxygen Delivery Method): room air        CONSTITUTIONAL: NAD, well-developed  RESPIRATORY: Normal respiratory effort; lungs are clear to auscultation bilaterally  CARDIOVASCULAR: Regular rate and rhythm, normal S1 and S2, no murmur/rub/gallop; No lower extremity edema; Peripheral pulses are 2+ bilaterally  ABDOMEN: Nontender to palpation, normoactive bowel sounds, no rebound/guarding; No hepatosplenomegaly  MUSCLOSKELETAL: no clubbing or cyanosis of digits; no joint swelling or tenderness to palpation  PSYCH: A+O to person, place, and time; affect appropriate    LABS:                      Tele Reviewed:    RADIOLOGY & ADDITIONAL TESTS:  Results Reviewed:   Imaging Personally Reviewed:  Electrocardiogram Personally Reviewed: PROGRESS NOTE:   Authored by: Bal Liang M.D.   Internal Medicine PGY-1  Please Contact Via Teams    Patient is a 53y old  Female who presents with a chief complaint of behavioral change; abnormal TFT (15 Feb 2023 06:59)      SUBJECTIVE / OVERNIGHT EVENTS:  No acute events overnight. Patient feeling well this am with no complaints.    MEDICATIONS  (STANDING):  benztropine 0.5 milliGRAM(s) Oral two times a day  levothyroxine 125 MICROGram(s) Oral daily  risperiDONE   Tablet 1 milliGRAM(s) Oral <User Schedule>  risperiDONE   Tablet 1 milliGRAM(s) Oral <User Schedule>    MEDICATIONS  (PRN):  acetaminophen     Tablet .. 650 milliGRAM(s) Oral every 6 hours PRN Temp greater or equal to 38C (100.4F), Mild Pain (1 - 3)  fluticasone propionate 50 MICROgram(s)/spray Nasal Spray 1 Spray(s) Both Nostrils two times a day PRN Congestion  guaiFENesin Oral Liquid (Sugar-Free) 100 milliGRAM(s) Oral every 6 hours PRN Cough  haloperidol    Injectable 2 milliGRAM(s) IV Push every 6 hours PRN Agitation      CAPILLARY BLOOD GLUCOSE        I&O's Summary      PHYSICAL EXAM:  Vital Signs Last 24 Hrs  T(C): 36.4 (16 Feb 2023 05:16), Max: 36.6 (15 Feb 2023 21:25)  T(F): 97.6 (16 Feb 2023 05:16), Max: 97.9 (15 Feb 2023 21:25)  HR: 77 (16 Feb 2023 05:16) (77 - 88)  BP: 122/88 (16 Feb 2023 05:16) (122/88 - 137/82)  BP(mean): --  RR: 18 (16 Feb 2023 05:16) (18 - 18)  SpO2: 99% (16 Feb 2023 05:16) (99% - 100%)    Parameters below as of 16 Feb 2023 05:16  Patient On (Oxygen Delivery Method): room air      CONSTITUTIONAL: NAD, well-developed  RESPIRATORY: Normal respiratory effort; lungs are clear to auscultation bilaterally  CARDIOVASCULAR: Regular rate and rhythm, normal S1 and S2, no murmurs. 1+ pitting edema  ABDOMEN: Nontender to palpation, normoactive bowel sounds, no rebound/guarding  PSYCH: No insight into her condition or why she originally came here.    LABS:

## 2023-02-17 DIAGNOSIS — R60.0 LOCALIZED EDEMA: ICD-10-CM

## 2023-02-17 PROCEDURE — 99232 SBSQ HOSP IP/OBS MODERATE 35: CPT | Mod: GC

## 2023-02-17 PROCEDURE — 73600 X-RAY EXAM OF ANKLE: CPT | Mod: 26,50

## 2023-02-17 RX ADMIN — Medication 125 MICROGRAM(S): at 05:05

## 2023-02-17 RX ADMIN — LIDOCAINE 1 PATCH: 4 CREAM TOPICAL at 22:32

## 2023-02-17 RX ADMIN — LIDOCAINE 1 PATCH: 4 CREAM TOPICAL at 18:33

## 2023-02-17 RX ADMIN — RISPERIDONE 1 MILLIGRAM(S): 4 TABLET ORAL at 05:05

## 2023-02-17 RX ADMIN — LIDOCAINE 1 PATCH: 4 CREAM TOPICAL at 01:29

## 2023-02-17 RX ADMIN — Medication 0.5 MILLIGRAM(S): at 05:05

## 2023-02-17 RX ADMIN — RISPERIDONE 1 MILLIGRAM(S): 4 TABLET ORAL at 20:43

## 2023-02-17 RX ADMIN — Medication 0.5 MILLIGRAM(S): at 17:16

## 2023-02-17 RX ADMIN — LIDOCAINE 1 PATCH: 4 CREAM TOPICAL at 11:07

## 2023-02-17 NOTE — PROGRESS NOTE ADULT - PROBLEM SELECTOR PLAN 5
RESOLVED  Mild Cough, no hypoxemia. No fevers. Tested positive for COVID on 2/3   Decided against remdesivir i/s/o CKD and mild infection  - continue to monitor for now  - Robitussin prn for cough  - flonase  - isolation precautions D/C - L>R. Will obtain b/l ankle x rays. Patient sometimes reports pain and sometimes not. Also reports twisted ankle.

## 2023-02-17 NOTE — PROGRESS NOTE ADULT - PROBLEM SELECTOR PLAN 2
Reported to be on Synthroid 125 mcg q.d., likely also non-adherent, with TSH of 410. Initially presenting with some lethargy. Completed steroid taper 1/15 due to concern for adrenal insufficiency.  - Levothyroxine 125 mcg  - Repeat TSH 6.49 and fT4 1.6 on 2/13, about 1 month from after restarting synthroid. Can follow up outpatient, or if still here would recheck in 4 weeks.

## 2023-02-17 NOTE — PROGRESS NOTE ADULT - SUBJECTIVE AND OBJECTIVE BOX
PROGRESS NOTE:   Authored by: Bal Liang M.D.   Internal Medicine PGY-1  Please Contact Via Teams    Patient is a 53y old  Female who presents with a chief complaint of behavioral change; abnormal TFT (16 Feb 2023 06:01)      SUBJECTIVE / OVERNIGHT EVENTS:  No acute events overnight.     Brief Daily Plan:    MEDICATIONS  (STANDING):  benztropine 0.5 milliGRAM(s) Oral two times a day  levothyroxine 125 MICROGram(s) Oral daily  lidocaine   4% Patch 1 Patch Transdermal daily  risperiDONE   Tablet 1 milliGRAM(s) Oral <User Schedule>  risperiDONE   Tablet 1 milliGRAM(s) Oral <User Schedule>    MEDICATIONS  (PRN):  acetaminophen     Tablet .. 650 milliGRAM(s) Oral every 6 hours PRN Temp greater or equal to 38C (100.4F), Mild Pain (1 - 3)  fluticasone propionate 50 MICROgram(s)/spray Nasal Spray 1 Spray(s) Both Nostrils two times a day PRN Congestion  guaiFENesin Oral Liquid (Sugar-Free) 100 milliGRAM(s) Oral every 6 hours PRN Cough  haloperidol    Injectable 2 milliGRAM(s) IV Push every 6 hours PRN Agitation      CAPILLARY BLOOD GLUCOSE        I&O's Summary      PHYSICAL EXAM:  Vital Signs Last 24 Hrs  T(C): 36.4 (17 Feb 2023 05:13), Max: 36.7 (16 Feb 2023 21:55)  T(F): 97.5 (17 Feb 2023 05:13), Max: 98.1 (16 Feb 2023 21:55)  HR: 83 (17 Feb 2023 05:13) (76 - 83)  BP: 134/77 (17 Feb 2023 05:13) (107/71 - 137/86)  BP(mean): --  RR: 16 (17 Feb 2023 05:13) (16 - 18)  SpO2: 100% (17 Feb 2023 05:13) (100% - 100%)    Parameters below as of 17 Feb 2023 05:13  Patient On (Oxygen Delivery Method): room air        CONSTITUTIONAL: NAD, well-developed  RESPIRATORY: Normal respiratory effort; lungs are clear to auscultation bilaterally  CARDIOVASCULAR: Regular rate and rhythm, normal S1 and S2, no murmur/rub/gallop; No lower extremity edema; Peripheral pulses are 2+ bilaterally  ABDOMEN: Nontender to palpation, normoactive bowel sounds, no rebound/guarding; No hepatosplenomegaly  MUSCLOSKELETAL: no clubbing or cyanosis of digits; no joint swelling or tenderness to palpation  PSYCH: A+O to person, place, and time; affect appropriate    LABS:                      Tele Reviewed:    RADIOLOGY & ADDITIONAL TESTS:  Results Reviewed:   Imaging Personally Reviewed:  Electrocardiogram Personally Reviewed: PROGRESS NOTE:   Authored by: Bal Liang M.D.   Internal Medicine PGY-1  Please Contact Via Teams    Patient is a 53y old  Female who presents with a chief complaint of behavioral change; abnormal TFT (16 Feb 2023 06:01)      SUBJECTIVE / OVERNIGHT EVENTS:  No acute events overnight. Patient talking to herself, responding to internal stimuli this am. This is the first time writer has seen patient like this but noted by others to be like this.    Brief Daily Plan:  - XR ankles    MEDICATIONS  (STANDING):  benztropine 0.5 milliGRAM(s) Oral two times a day  levothyroxine 125 MICROGram(s) Oral daily  lidocaine   4% Patch 1 Patch Transdermal daily  risperiDONE   Tablet 1 milliGRAM(s) Oral <User Schedule>  risperiDONE   Tablet 1 milliGRAM(s) Oral <User Schedule>    MEDICATIONS  (PRN):  acetaminophen     Tablet .. 650 milliGRAM(s) Oral every 6 hours PRN Temp greater or equal to 38C (100.4F), Mild Pain (1 - 3)  fluticasone propionate 50 MICROgram(s)/spray Nasal Spray 1 Spray(s) Both Nostrils two times a day PRN Congestion  guaiFENesin Oral Liquid (Sugar-Free) 100 milliGRAM(s) Oral every 6 hours PRN Cough  haloperidol    Injectable 2 milliGRAM(s) IV Push every 6 hours PRN Agitation      CAPILLARY BLOOD GLUCOSE        I&O's Summary      PHYSICAL EXAM:  Vital Signs Last 24 Hrs  T(C): 36.4 (17 Feb 2023 05:13), Max: 36.7 (16 Feb 2023 21:55)  T(F): 97.5 (17 Feb 2023 05:13), Max: 98.1 (16 Feb 2023 21:55)  HR: 83 (17 Feb 2023 05:13) (76 - 83)  BP: 134/77 (17 Feb 2023 05:13) (107/71 - 137/86)  BP(mean): --  RR: 16 (17 Feb 2023 05:13) (16 - 18)  SpO2: 100% (17 Feb 2023 05:13) (100% - 100%)    Parameters below as of 17 Feb 2023 05:13  Patient On (Oxygen Delivery Method): room air        CONSTITUTIONAL: NAD, well-developed  RESPIRATORY: Normal respiratory effort; lungs are clear to auscultation bilaterally  CARDIOVASCULAR: Regular rate and rhythm, normal S1 and S2, no murmurs. 1+ pitting edema, L>R  ABDOMEN: Nontender to palpation, normoactive bowel sounds, no rebound/guarding  PSYCH: No insight into her condition or why she originally came here. Responding to internal stimuli.    LABS:

## 2023-02-17 NOTE — PROGRESS NOTE ADULT - ATTENDING COMMENTS
52F previously domiciled with recently  mother PMH bipolar disorder, hypothyroidism BIBEMS for abnormal behavior reported by cousin. Admitted for acute encephalopathy in the setting of psychosis due to lithium nonadherence with course c/b acute renal failure and severe hypothyroidism (TSH 400s on admission). Course c/b +COVID but saturating well on RA.    Walking around in the room. Feeling well. Reports lidocaine patch helps. Physical exam noted for LLE edema >RLE    #Bipolar disorder w/ psychosis - C/w cogentin and risperidone per psych recs  #Hypothyroidism - c/w synthroid 125mcg per Endo recs. Repeat TSH and Free T4 -->6.49 and 1.6 on . C/w current dose and repeat in 4-6 weeks  #COVID-19 - Continue airborne/contact isolation for COVID: positive since 2/3. DC on isolation on   #Pedal edema/LE edema: no JVD noted. Vitals stable. Ambulatory. Encourage elevation of LE while sitting. Will check ddimer. Continue to monitor.     Dispo: Pending guardianship. Prolonged DC planning for placement    Discussed with Dr. Liang.

## 2023-02-17 NOTE — PROGRESS NOTE ADULT - PROBLEM SELECTOR PLAN 1
She has bipolar disorder previously on lithium but unclear adherence prior to admission as serum levels low.  - Risperidone 1 mg bid (7 am & 8 pm). Benztropine 0.5 bid for EPS  - Haldol 2 mg IV/IM/p.o. q6h prn for agitation/aggression (last required x1 on )  - QTc 430s after increasing risperidone dose.  - weekly EK/14 QTc 436  - Psychiatry following, recs appreciated

## 2023-02-18 LAB — D DIMER BLD IA.RAPID-MCNC: 195 NG/ML DDU — SIGNIFICANT CHANGE UP

## 2023-02-18 PROCEDURE — 99232 SBSQ HOSP IP/OBS MODERATE 35: CPT

## 2023-02-18 RX ADMIN — Medication 125 MICROGRAM(S): at 05:01

## 2023-02-18 RX ADMIN — LIDOCAINE 1 PATCH: 4 CREAM TOPICAL at 12:33

## 2023-02-18 RX ADMIN — LIDOCAINE 1 PATCH: 4 CREAM TOPICAL at 23:49

## 2023-02-18 RX ADMIN — RISPERIDONE 1 MILLIGRAM(S): 4 TABLET ORAL at 20:41

## 2023-02-18 RX ADMIN — Medication 0.5 MILLIGRAM(S): at 05:01

## 2023-02-18 RX ADMIN — Medication 0.5 MILLIGRAM(S): at 18:37

## 2023-02-18 RX ADMIN — RISPERIDONE 1 MILLIGRAM(S): 4 TABLET ORAL at 05:01

## 2023-02-18 RX ADMIN — LIDOCAINE 1 PATCH: 4 CREAM TOPICAL at 20:14

## 2023-02-18 NOTE — PROGRESS NOTE ADULT - PROBLEM SELECTOR PLAN 5
- L>R. Will obtain b/l ankle x rays. Patient sometimes reports pain and sometimes not. Also reports twisted ankle. - L>R. B/l ankle x rays with no obvious findings. Patient sometimes reports pain and sometimes not. Also reports twisted ankle.  - Wells for DVT 1. D Dimer wnl. Low concern for DVT

## 2023-02-18 NOTE — PROGRESS NOTE ADULT - ATTENDING COMMENTS
52F previously domiciled with recently  mother PMH bipolar disorder, hypothyroidism BIBEMS for abnormal behavior reported by cousin. Admitted for acute encephalopathy in the setting of psychosis due to lithium nonadherence with course c/b acute renal failure and severe hypothyroidism (TSH 400s on admission). Course c/b +COVID but saturating well on RA.      #Bipolar disorder w/ psychosis - C/w cogentin and risperidone per psych recs  #Hypothyroidism - c/w synthroid 125mcg per Endo recs. Repeat TSH and Free T4 -->6.49 and 1.6 on . C/w current dose and repeat in 4-6 weeks  #COVID-19 - Continue airborne/contact isolation for COVID: positive since 2/3. DC on isolation on   #Pedal edema/LE edema: no JVD noted. Vitals stable. Ambulatory. Encourage elevation of LE while sitting. Dimer wnl. Continue to monitor.     Dispo: Pending guardianship. Prolonged DC planning for placement

## 2023-02-18 NOTE — PROGRESS NOTE ADULT - SUBJECTIVE AND OBJECTIVE BOX
PROGRESS NOTE:   Authored by: Bal Liang M.D.   Internal Medicine PGY-1  Please Contact Via Teams    Patient is a 53y old  Female who presents with a chief complaint of behavioral change; abnormal TFT (17 Feb 2023 07:02)      SUBJECTIVE / OVERNIGHT EVENTS:  No acute events overnight.     Brief Daily Plan:    MEDICATIONS  (STANDING):  benztropine 0.5 milliGRAM(s) Oral two times a day  levothyroxine 125 MICROGram(s) Oral daily  lidocaine   4% Patch 1 Patch Transdermal daily  risperiDONE   Tablet 1 milliGRAM(s) Oral <User Schedule>  risperiDONE   Tablet 1 milliGRAM(s) Oral <User Schedule>    MEDICATIONS  (PRN):  acetaminophen     Tablet .. 650 milliGRAM(s) Oral every 6 hours PRN Temp greater or equal to 38C (100.4F), Mild Pain (1 - 3)  fluticasone propionate 50 MICROgram(s)/spray Nasal Spray 1 Spray(s) Both Nostrils two times a day PRN Congestion  guaiFENesin Oral Liquid (Sugar-Free) 100 milliGRAM(s) Oral every 6 hours PRN Cough  haloperidol    Injectable 2 milliGRAM(s) IV Push every 6 hours PRN Agitation      CAPILLARY BLOOD GLUCOSE        I&O's Summary      PHYSICAL EXAM:  Vital Signs Last 24 Hrs  T(C): 36.7 (18 Feb 2023 05:01), Max: 36.7 (18 Feb 2023 05:01)  T(F): 98 (18 Feb 2023 05:01), Max: 98 (18 Feb 2023 05:01)  HR: 65 (18 Feb 2023 05:01) (65 - 78)  BP: 119/85 (18 Feb 2023 05:01) (119/85 - 141/89)  BP(mean): --  RR: 16 (18 Feb 2023 05:01) (16 - 17)  SpO2: 100% (18 Feb 2023 05:01) (100% - 100%)    Parameters below as of 18 Feb 2023 05:01  Patient On (Oxygen Delivery Method): room air        CONSTITUTIONAL: NAD, well-developed  RESPIRATORY: Normal respiratory effort; lungs are clear to auscultation bilaterally  CARDIOVASCULAR: Regular rate and rhythm, normal S1 and S2, no murmur/rub/gallop; No lower extremity edema; Peripheral pulses are 2+ bilaterally  ABDOMEN: Nontender to palpation, normoactive bowel sounds, no rebound/guarding; No hepatosplenomegaly  MUSCLOSKELETAL: no clubbing or cyanosis of digits; no joint swelling or tenderness to palpation  PSYCH: A+O to person, place, and time; affect appropriate    LABS:                      Tele Reviewed:    RADIOLOGY & ADDITIONAL TESTS:  Results Reviewed:   Imaging Personally Reviewed:  Electrocardiogram Personally Reviewed: PROGRESS NOTE:   Authored by: Bal Liang M.D.   Internal Medicine PGY-1  Please Contact Via Teams    Patient is a 53y old  Female who presents with a chief complaint of behavioral change; abnormal TFT (17 Feb 2023 07:02)      SUBJECTIVE / OVERNIGHT EVENTS:  No acute events overnight. Patient not responding to stimuli this am. No complaints. No ankle pain. D Dimer wnl    MEDICATIONS  (STANDING):  benztropine 0.5 milliGRAM(s) Oral two times a day  levothyroxine 125 MICROGram(s) Oral daily  lidocaine   4% Patch 1 Patch Transdermal daily  risperiDONE   Tablet 1 milliGRAM(s) Oral <User Schedule>  risperiDONE   Tablet 1 milliGRAM(s) Oral <User Schedule>    MEDICATIONS  (PRN):  acetaminophen     Tablet .. 650 milliGRAM(s) Oral every 6 hours PRN Temp greater or equal to 38C (100.4F), Mild Pain (1 - 3)  fluticasone propionate 50 MICROgram(s)/spray Nasal Spray 1 Spray(s) Both Nostrils two times a day PRN Congestion  guaiFENesin Oral Liquid (Sugar-Free) 100 milliGRAM(s) Oral every 6 hours PRN Cough  haloperidol    Injectable 2 milliGRAM(s) IV Push every 6 hours PRN Agitation      CAPILLARY BLOOD GLUCOSE        I&O's Summary      PHYSICAL EXAM:  Vital Signs Last 24 Hrs  T(C): 36.7 (18 Feb 2023 05:01), Max: 36.7 (18 Feb 2023 05:01)  T(F): 98 (18 Feb 2023 05:01), Max: 98 (18 Feb 2023 05:01)  HR: 65 (18 Feb 2023 05:01) (65 - 78)  BP: 119/85 (18 Feb 2023 05:01) (119/85 - 141/89)  BP(mean): --  RR: 16 (18 Feb 2023 05:01) (16 - 17)  SpO2: 100% (18 Feb 2023 05:01) (100% - 100%)    Parameters below as of 18 Feb 2023 05:01  Patient On (Oxygen Delivery Method): room air      CONSTITUTIONAL: NAD, well-developed  RESPIRATORY: Normal respiratory effort; lungs are clear to auscultation bilaterally  CARDIOVASCULAR: Regular rate and rhythm, normal S1 and S2, no murmurs. 1+ pitting edema  ABDOMEN: Nontender to palpation, normoactive bowel sounds, no rebound/guarding  PSYCH: No insight into her condition or why she originally came here.      LABS:      D Dimer 195, wnl

## 2023-02-18 NOTE — PROGRESS NOTE ADULT - PROBLEM SELECTOR PLAN 3
In the setting of her diabetes. A1c now within normal range.  - BMP weekly  - remains at baseline SCr. CKD 3A  - d/c metformin on discharge

## 2023-02-19 PROCEDURE — 99232 SBSQ HOSP IP/OBS MODERATE 35: CPT

## 2023-02-19 RX ORDER — SENNA PLUS 8.6 MG/1
2 TABLET ORAL AT BEDTIME
Refills: 0 | Status: DISCONTINUED | OUTPATIENT
Start: 2023-02-19 | End: 2023-03-10

## 2023-02-19 RX ADMIN — LIDOCAINE 1 PATCH: 4 CREAM TOPICAL at 23:30

## 2023-02-19 RX ADMIN — LIDOCAINE 1 PATCH: 4 CREAM TOPICAL at 19:47

## 2023-02-19 RX ADMIN — Medication 0.5 MILLIGRAM(S): at 17:48

## 2023-02-19 RX ADMIN — RISPERIDONE 1 MILLIGRAM(S): 4 TABLET ORAL at 07:06

## 2023-02-19 RX ADMIN — Medication 125 MICROGRAM(S): at 07:06

## 2023-02-19 RX ADMIN — LIDOCAINE 1 PATCH: 4 CREAM TOPICAL at 11:41

## 2023-02-19 RX ADMIN — Medication 0.5 MILLIGRAM(S): at 07:06

## 2023-02-19 RX ADMIN — SENNA PLUS 2 TABLET(S): 8.6 TABLET ORAL at 20:42

## 2023-02-19 RX ADMIN — RISPERIDONE 1 MILLIGRAM(S): 4 TABLET ORAL at 20:42

## 2023-02-19 NOTE — PROGRESS NOTE ADULT - PROBLEM SELECTOR PLAN 5
- L>R. B/l ankle x rays with no obvious findings. Patient sometimes reports pain and sometimes not. Also reports twisted ankle.  - Wells for DVT 1. D Dimer wnl. Low concern for DVT

## 2023-02-19 NOTE — PROGRESS NOTE ADULT - ATTENDING COMMENTS
52F previously domiciled with recently  mother PMH bipolar disorder, hypothyroidism BIBEMS for abnormal behavior reported by cousin. Admitted for acute encephalopathy in the setting of psychosis due to lithium nonadherence with course c/b acute renal failure and severe hypothyroidism (TSH 400s on admission). Course c/b +COVID but saturating well on RA.      #Bipolar disorder w/ psychosis - C/w cogentin and risperidone per psych recs  #Hypothyroidism - c/w synthroid 125mcg per Endo recs. Repeat TSH and Free T4 -->6.49 and 1.6 on . C/w current dose and repeat in 4-6 weeks  #COVID-19 - Continue airborne/contact isolation for COVID: positive since 2/3. DC on isolation on   #Pedal edema/LE edema: no JVD noted. Vitals stable. Ambulatory. Encourage elevation of LE while sitting. Dimer wnl. Continue to monitor.     Dispo: Pending guardianship. Prolonged DC planning for placement .

## 2023-02-19 NOTE — PROGRESS NOTE ADULT - SUBJECTIVE AND OBJECTIVE BOX
Teresa Muñoz MD  PGY 3 Department of Internal Medicine      Patient is a 53y old  Female who presents with a chief complaint of behavioral change; abnormal TFT (18 Feb 2023 07:01)      SUBJECTIVE / OVERNIGHT EVENTS: Pt seen and examined. No acute overnight events.   Denies fevers, chills, CP/palpitations, SOB/cough, abdominal pain, N/V, constipation, or diarrhea.        MEDICATIONS  (STANDING):  benztropine 0.5 milliGRAM(s) Oral two times a day  levothyroxine 125 MICROGram(s) Oral daily  lidocaine   4% Patch 1 Patch Transdermal daily  risperiDONE   Tablet 1 milliGRAM(s) Oral <User Schedule>  risperiDONE   Tablet 1 milliGRAM(s) Oral <User Schedule>    MEDICATIONS  (PRN):  acetaminophen     Tablet .. 650 milliGRAM(s) Oral every 6 hours PRN Temp greater or equal to 38C (100.4F), Mild Pain (1 - 3)  fluticasone propionate 50 MICROgram(s)/spray Nasal Spray 1 Spray(s) Both Nostrils two times a day PRN Congestion  guaiFENesin Oral Liquid (Sugar-Free) 100 milliGRAM(s) Oral every 6 hours PRN Cough  haloperidol    Injectable 2 milliGRAM(s) IV Push every 6 hours PRN Agitation      I&O's Summary      Vital Signs Last 24 Hrs  T(C): 36.5 (19 Feb 2023 05:16), Max: 36.6 (18 Feb 2023 21:26)  T(F): 97.7 (19 Feb 2023 05:16), Max: 97.8 (18 Feb 2023 21:26)  HR: 71 (19 Feb 2023 05:16) (71 - 79)  BP: 110/60 (19 Feb 2023 05:16) (110/60 - 137/80)  BP(mean): --  RR: 16 (19 Feb 2023 05:16) (16 - 17)  SpO2: 100% (19 Feb 2023 05:16) (100% - 100%)    Parameters below as of 19 Feb 2023 05:16  Patient On (Oxygen Delivery Method): room air        CAPILLARY BLOOD GLUCOSE          PHYSICAL EXAM:  GENERAL: NAD,   HEAD:  Atraumatic, Normocephalic  EYES: EOMI, PERRL, conjunctiva and sclera clear  NECK: No JVD  CHEST/LUNG: Clear to auscultation bilaterally; No wheeze  HEART: Regular rate and rhythm; No murmurs, rubs, or gallops  ABDOMEN: Soft, Nontender, Nondistended; Bowel sounds present  EXTREMITIES:  2+ Peripheral Pulses, No clubbing, cyanosis, or edema  PSYCH: AAOx3  NEUROLOGY: non-focal  SKIN: No rashes or lesions       LABS:                       Teresa Muñoz MD  PGY 3 Department of Internal Medicine      Patient is a 53y old  Female who presents with a chief complaint of behavioral change; abnormal TFT (18 Feb 2023 07:01)      SUBJECTIVE / OVERNIGHT EVENTS: Pt seen and examined. No acute overnight events. Pt is feeling well. States last bowel movement was last week, denies nausea or vomiting.      MEDICATIONS  (STANDING):  benztropine 0.5 milliGRAM(s) Oral two times a day  levothyroxine 125 MICROGram(s) Oral daily  lidocaine   4% Patch 1 Patch Transdermal daily  risperiDONE   Tablet 1 milliGRAM(s) Oral <User Schedule>  risperiDONE   Tablet 1 milliGRAM(s) Oral <User Schedule>    MEDICATIONS  (PRN):  acetaminophen     Tablet .. 650 milliGRAM(s) Oral every 6 hours PRN Temp greater or equal to 38C (100.4F), Mild Pain (1 - 3)  fluticasone propionate 50 MICROgram(s)/spray Nasal Spray 1 Spray(s) Both Nostrils two times a day PRN Congestion  guaiFENesin Oral Liquid (Sugar-Free) 100 milliGRAM(s) Oral every 6 hours PRN Cough  haloperidol    Injectable 2 milliGRAM(s) IV Push every 6 hours PRN Agitation      I&O's Summary      Vital Signs Last 24 Hrs  T(C): 36.5 (19 Feb 2023 05:16), Max: 36.6 (18 Feb 2023 21:26)  T(F): 97.7 (19 Feb 2023 05:16), Max: 97.8 (18 Feb 2023 21:26)  HR: 71 (19 Feb 2023 05:16) (71 - 79)  BP: 110/60 (19 Feb 2023 05:16) (110/60 - 137/80)  BP(mean): --  RR: 16 (19 Feb 2023 05:16) (16 - 17)  SpO2: 100% (19 Feb 2023 05:16) (100% - 100%)    Parameters below as of 19 Feb 2023 05:16  Patient On (Oxygen Delivery Method): room air        CAPILLARY BLOOD GLUCOSE          PHYSICAL EXAM:  GENERAL: NAD,   HEAD:  Atraumatic, Normocephalic  EYES: EOMI, PERRL, conjunctiva and sclera clear  NECK: No JVD  CHEST/LUNG: Clear to auscultation bilaterally; No wheeze  HEART: Regular rate and rhythm; No murmurs, rubs, or gallops  ABDOMEN: Soft, Nontender, Nondistended; Bowel sounds present  EXTREMITIES:  2+ Peripheral Pulses, No clubbing, cyanosis, or edema  PSYCH: AAOx3  NEUROLOGY: non-focal  SKIN: No rashes or lesions       LABS:

## 2023-02-20 DIAGNOSIS — K59.00 CONSTIPATION, UNSPECIFIED: ICD-10-CM

## 2023-02-20 LAB
ALBUMIN SERPL ELPH-MCNC: 3.7 G/DL — SIGNIFICANT CHANGE UP (ref 3.3–5)
ALP SERPL-CCNC: 101 U/L — SIGNIFICANT CHANGE UP (ref 40–120)
ALT FLD-CCNC: 10 U/L — SIGNIFICANT CHANGE UP (ref 4–33)
ANION GAP SERPL CALC-SCNC: 11 MMOL/L — SIGNIFICANT CHANGE UP (ref 7–14)
AST SERPL-CCNC: 13 U/L — SIGNIFICANT CHANGE UP (ref 4–32)
BASOPHILS # BLD AUTO: 0.03 K/UL — SIGNIFICANT CHANGE UP (ref 0–0.2)
BASOPHILS NFR BLD AUTO: 0.5 % — SIGNIFICANT CHANGE UP (ref 0–2)
BILIRUB SERPL-MCNC: <0.2 MG/DL — SIGNIFICANT CHANGE UP (ref 0.2–1.2)
BUN SERPL-MCNC: 30 MG/DL — HIGH (ref 7–23)
CALCIUM SERPL-MCNC: 9.1 MG/DL — SIGNIFICANT CHANGE UP (ref 8.4–10.5)
CHLORIDE SERPL-SCNC: 106 MMOL/L — SIGNIFICANT CHANGE UP (ref 98–107)
CO2 SERPL-SCNC: 21 MMOL/L — LOW (ref 22–31)
CREAT SERPL-MCNC: 1.35 MG/DL — HIGH (ref 0.5–1.3)
EGFR: 47 ML/MIN/1.73M2 — LOW
EOSINOPHIL # BLD AUTO: 0.15 K/UL — SIGNIFICANT CHANGE UP (ref 0–0.5)
EOSINOPHIL NFR BLD AUTO: 2.7 % — SIGNIFICANT CHANGE UP (ref 0–6)
GLUCOSE SERPL-MCNC: 97 MG/DL — SIGNIFICANT CHANGE UP (ref 70–99)
HCT VFR BLD CALC: 30.5 % — LOW (ref 34.5–45)
HGB BLD-MCNC: 9.4 G/DL — LOW (ref 11.5–15.5)
IANC: 3.4 K/UL — SIGNIFICANT CHANGE UP (ref 1.8–7.4)
IMM GRANULOCYTES NFR BLD AUTO: 0.4 % — SIGNIFICANT CHANGE UP (ref 0–0.9)
LYMPHOCYTES # BLD AUTO: 1.33 K/UL — SIGNIFICANT CHANGE UP (ref 1–3.3)
LYMPHOCYTES # BLD AUTO: 23.9 % — SIGNIFICANT CHANGE UP (ref 13–44)
MAGNESIUM SERPL-MCNC: 1.9 MG/DL — SIGNIFICANT CHANGE UP (ref 1.6–2.6)
MCHC RBC-ENTMCNC: 27.2 PG — SIGNIFICANT CHANGE UP (ref 27–34)
MCHC RBC-ENTMCNC: 30.8 GM/DL — LOW (ref 32–36)
MCV RBC AUTO: 88.4 FL — SIGNIFICANT CHANGE UP (ref 80–100)
MONOCYTES # BLD AUTO: 0.63 K/UL — SIGNIFICANT CHANGE UP (ref 0–0.9)
MONOCYTES NFR BLD AUTO: 11.3 % — SIGNIFICANT CHANGE UP (ref 2–14)
NEUTROPHILS # BLD AUTO: 3.4 K/UL — SIGNIFICANT CHANGE UP (ref 1.8–7.4)
NEUTROPHILS NFR BLD AUTO: 61.2 % — SIGNIFICANT CHANGE UP (ref 43–77)
NRBC # BLD: 0 /100 WBCS — SIGNIFICANT CHANGE UP (ref 0–0)
NRBC # FLD: 0 K/UL — SIGNIFICANT CHANGE UP (ref 0–0)
PHOSPHATE SERPL-MCNC: 4 MG/DL — SIGNIFICANT CHANGE UP (ref 2.5–4.5)
PLATELET # BLD AUTO: 220 K/UL — SIGNIFICANT CHANGE UP (ref 150–400)
POTASSIUM SERPL-MCNC: 4 MMOL/L — SIGNIFICANT CHANGE UP (ref 3.5–5.3)
POTASSIUM SERPL-SCNC: 4 MMOL/L — SIGNIFICANT CHANGE UP (ref 3.5–5.3)
PROT SERPL-MCNC: 6.3 G/DL — SIGNIFICANT CHANGE UP (ref 6–8.3)
RBC # BLD: 3.45 M/UL — LOW (ref 3.8–5.2)
RBC # FLD: 13 % — SIGNIFICANT CHANGE UP (ref 10.3–14.5)
SODIUM SERPL-SCNC: 138 MMOL/L — SIGNIFICANT CHANGE UP (ref 135–145)
WBC # BLD: 5.56 K/UL — SIGNIFICANT CHANGE UP (ref 3.8–10.5)
WBC # FLD AUTO: 5.56 K/UL — SIGNIFICANT CHANGE UP (ref 3.8–10.5)

## 2023-02-20 PROCEDURE — 99232 SBSQ HOSP IP/OBS MODERATE 35: CPT

## 2023-02-20 RX ORDER — POLYETHYLENE GLYCOL 3350 17 G/17G
17 POWDER, FOR SOLUTION ORAL DAILY
Refills: 0 | Status: DISCONTINUED | OUTPATIENT
Start: 2023-02-20 | End: 2023-03-10

## 2023-02-20 RX ADMIN — RISPERIDONE 1 MILLIGRAM(S): 4 TABLET ORAL at 06:55

## 2023-02-20 RX ADMIN — RISPERIDONE 1 MILLIGRAM(S): 4 TABLET ORAL at 20:55

## 2023-02-20 RX ADMIN — Medication 0.5 MILLIGRAM(S): at 17:05

## 2023-02-20 RX ADMIN — POLYETHYLENE GLYCOL 3350 17 GRAM(S): 17 POWDER, FOR SOLUTION ORAL at 09:14

## 2023-02-20 RX ADMIN — SENNA PLUS 2 TABLET(S): 8.6 TABLET ORAL at 20:55

## 2023-02-20 RX ADMIN — LIDOCAINE 1 PATCH: 4 CREAM TOPICAL at 20:00

## 2023-02-20 RX ADMIN — LIDOCAINE 1 PATCH: 4 CREAM TOPICAL at 11:10

## 2023-02-20 RX ADMIN — Medication 0.5 MILLIGRAM(S): at 06:56

## 2023-02-20 RX ADMIN — Medication 125 MICROGRAM(S): at 06:55

## 2023-02-20 NOTE — PROGRESS NOTE ADULT - SUBJECTIVE AND OBJECTIVE BOX
PROGRESS NOTE:   Authored by: Bal Liang M.D.   Internal Medicine PGY-1  Please Contact Via Teams    Patient is a 53y old  Female who presents with a chief complaint of behavioral change; abnormal TFT (19 Feb 2023 06:17)      SUBJECTIVE / OVERNIGHT EVENTS:  No acute events overnight.     Brief Daily Plan:    MEDICATIONS  (STANDING):  benztropine 0.5 milliGRAM(s) Oral two times a day  levothyroxine 125 MICROGram(s) Oral daily  lidocaine   4% Patch 1 Patch Transdermal daily  risperiDONE   Tablet 1 milliGRAM(s) Oral <User Schedule>  risperiDONE   Tablet 1 milliGRAM(s) Oral <User Schedule>  senna 2 Tablet(s) Oral at bedtime    MEDICATIONS  (PRN):  acetaminophen     Tablet .. 650 milliGRAM(s) Oral every 6 hours PRN Temp greater or equal to 38C (100.4F), Mild Pain (1 - 3)  fluticasone propionate 50 MICROgram(s)/spray Nasal Spray 1 Spray(s) Both Nostrils two times a day PRN Congestion  guaiFENesin Oral Liquid (Sugar-Free) 100 milliGRAM(s) Oral every 6 hours PRN Cough  haloperidol    Injectable 2 milliGRAM(s) IV Push every 6 hours PRN Agitation      CAPILLARY BLOOD GLUCOSE        I&O's Summary      PHYSICAL EXAM:  Vital Signs Last 24 Hrs  T(C): 36.9 (19 Feb 2023 22:07), Max: 36.9 (19 Feb 2023 22:07)  T(F): 98.4 (19 Feb 2023 22:07), Max: 98.4 (19 Feb 2023 22:07)  HR: 94 (19 Feb 2023 22:07) (92 - 94)  BP: 113/64 (19 Feb 2023 22:07) (113/64 - 113/64)  BP(mean): --  RR: 18 (19 Feb 2023 22:07) (18 - 18)  SpO2: 100% (19 Feb 2023 22:07) (100% - 100%)    Parameters below as of 19 Feb 2023 22:07  Patient On (Oxygen Delivery Method): room air        CONSTITUTIONAL: NAD, well-developed  RESPIRATORY: Normal respiratory effort; lungs are clear to auscultation bilaterally  CARDIOVASCULAR: Regular rate and rhythm, normal S1 and S2, no murmur/rub/gallop; No lower extremity edema; Peripheral pulses are 2+ bilaterally  ABDOMEN: Nontender to palpation, normoactive bowel sounds, no rebound/guarding; No hepatosplenomegaly  MUSCLOSKELETAL: no clubbing or cyanosis of digits; no joint swelling or tenderness to palpation  PSYCH: A+O to person, place, and time; affect appropriate    LABS:                      Tele Reviewed:    RADIOLOGY & ADDITIONAL TESTS:  Results Reviewed:   Imaging Personally Reviewed:  Electrocardiogram Personally Reviewed: PROGRESS NOTE:   Authored by: Bal Liang M.D.   Internal Medicine PGY-1  Please Contact Via Teams    Patient is a 53y old  Female who presents with a chief complaint of behavioral change; abnormal TFT (19 Feb 2023 06:17)      SUBJECTIVE / OVERNIGHT EVENTS:  No acute events overnight. Patient feeling well this morning, no complaints. Has not yet had a BM    Brief Daily Plan:  - Add miralax  - Weekly labs    MEDICATIONS  (STANDING):  benztropine 0.5 milliGRAM(s) Oral two times a day  levothyroxine 125 MICROGram(s) Oral daily  lidocaine   4% Patch 1 Patch Transdermal daily  risperiDONE   Tablet 1 milliGRAM(s) Oral <User Schedule>  risperiDONE   Tablet 1 milliGRAM(s) Oral <User Schedule>  senna 2 Tablet(s) Oral at bedtime    MEDICATIONS  (PRN):  acetaminophen     Tablet .. 650 milliGRAM(s) Oral every 6 hours PRN Temp greater or equal to 38C (100.4F), Mild Pain (1 - 3)  fluticasone propionate 50 MICROgram(s)/spray Nasal Spray 1 Spray(s) Both Nostrils two times a day PRN Congestion  guaiFENesin Oral Liquid (Sugar-Free) 100 milliGRAM(s) Oral every 6 hours PRN Cough  haloperidol    Injectable 2 milliGRAM(s) IV Push every 6 hours PRN Agitation      CAPILLARY BLOOD GLUCOSE        I&O's Summary      PHYSICAL EXAM:  Vital Signs Last 24 Hrs  T(C): 36.9 (19 Feb 2023 22:07), Max: 36.9 (19 Feb 2023 22:07)  T(F): 98.4 (19 Feb 2023 22:07), Max: 98.4 (19 Feb 2023 22:07)  HR: 94 (19 Feb 2023 22:07) (92 - 94)  BP: 113/64 (19 Feb 2023 22:07) (113/64 - 113/64)  BP(mean): --  RR: 18 (19 Feb 2023 22:07) (18 - 18)  SpO2: 100% (19 Feb 2023 22:07) (100% - 100%)    Parameters below as of 19 Feb 2023 22:07  Patient On (Oxygen Delivery Method): room air      CONSTITUTIONAL: NAD, well-developed  RESPIRATORY: Normal respiratory effort; lungs are clear to auscultation bilaterally  CARDIOVASCULAR: Regular rate and rhythm, normal S1 and S2, no murmurs. 1+ pitting edema L ankle  ABDOMEN: Nontender to palpation, normoactive bowel sounds, no rebound/guarding  PSYCH: No insight into her condition or why she originally came here.        LABS:                         9.4    5.56  )-----------( 220      ( 20 Feb 2023 05:45 )             30.5     02-20    138  |  106  |  30<H>  ----------------------------<  97  4.0   |  21<L>  |  1.35<H>    Ca    9.1      20 Feb 2023 05:45  Phos  4.0     02-20  Mg     1.90     02-20    TPro  6.3  /  Alb  3.7  /  TBili  <0.2  /  DBili  x   /  AST  13  /  ALT  10  /  AlkPhos  101  02-20

## 2023-02-20 NOTE — PROGRESS NOTE ADULT - PROBLEM SELECTOR PLAN 5
- L>R. B/l ankle x rays with no obvious findings. Patient sometimes reports pain and sometimes not. Also reports twisted ankle.  - Wells for DVT 1. D Dimer wnl. Low concern for DVT Reportedly prescribed rosuvastatin 10 mg p.o. q.d.  - Will need outpatient lipid panel once thyroid function tests have stabilized prior to re-initiation

## 2023-02-20 NOTE — PROGRESS NOTE ADULT - PROBLEM SELECTOR PLAN 6
RESOLVED  Mild Cough, no hypoxemia. No fevers. Tested positive for COVID on 2/3   Decided against remdesivir i/s/o CKD and mild infection  - continue to monitor for now  - Robitussin prn for cough  - flonase  - isolation precautions D/C - L>R. B/l ankle x rays with no obvious findings. Patient sometimes reports pain and sometimes not. Also reports twisted ankle.  - Wells for DVT 1. D Dimer wnl. Low concern for DVT

## 2023-02-20 NOTE — PROGRESS NOTE ADULT - PROBLEM SELECTOR PLAN 3
In the setting of her diabetes. A1c now within normal range.  - BMP weekly  - remains at baseline SCr. CKD 3A  - d/c metformin on discharge - No BM since last week according to patient  - Hellen started 2/19, will add miralax today.

## 2023-02-20 NOTE — PROGRESS NOTE ADULT - PROBLEM SELECTOR PLAN 4
Reportedly prescribed rosuvastatin 10 mg p.o. q.d.  - Will need outpatient lipid panel once thyroid function tests have stabilized prior to re-initiation In the setting of her diabetes. A1c now within normal range.  - BMP weekly  - remains at baseline SCr. CKD 3A  - d/c metformin on discharge

## 2023-02-21 PROCEDURE — 93010 ELECTROCARDIOGRAM REPORT: CPT

## 2023-02-21 PROCEDURE — 99232 SBSQ HOSP IP/OBS MODERATE 35: CPT | Mod: GC

## 2023-02-21 RX ADMIN — LIDOCAINE 1 PATCH: 4 CREAM TOPICAL at 19:18

## 2023-02-21 RX ADMIN — POLYETHYLENE GLYCOL 3350 17 GRAM(S): 17 POWDER, FOR SOLUTION ORAL at 11:48

## 2023-02-21 RX ADMIN — Medication 125 MICROGRAM(S): at 07:08

## 2023-02-21 RX ADMIN — RISPERIDONE 1 MILLIGRAM(S): 4 TABLET ORAL at 07:08

## 2023-02-21 RX ADMIN — LIDOCAINE 1 PATCH: 4 CREAM TOPICAL at 11:48

## 2023-02-21 RX ADMIN — Medication 0.5 MILLIGRAM(S): at 07:08

## 2023-02-21 RX ADMIN — LIDOCAINE 1 PATCH: 4 CREAM TOPICAL at 01:58

## 2023-02-21 RX ADMIN — RISPERIDONE 1 MILLIGRAM(S): 4 TABLET ORAL at 22:20

## 2023-02-21 RX ADMIN — SENNA PLUS 2 TABLET(S): 8.6 TABLET ORAL at 22:20

## 2023-02-21 RX ADMIN — Medication 0.5 MILLIGRAM(S): at 18:03

## 2023-02-21 RX ADMIN — LIDOCAINE 1 PATCH: 4 CREAM TOPICAL at 23:51

## 2023-02-21 NOTE — PROGRESS NOTE ADULT - PROBLEM SELECTOR PLAN 3
- No BM since last week according to patient  - Hellen started 2/19, will add miralax today. - No BM since last week according to patient  - Senna started 2/19, miralax 2/20. Since she had a BM, continue daily miralax

## 2023-02-21 NOTE — PROGRESS NOTE ADULT - ATTENDING COMMENTS
52F previously domiciled with recently  mother PMH bipolar disorder, hypothyroidism BIBEMS for abnormal behavior reported by cousin. Admitted for acute encephalopathy in the setting of psychosis due to lithium nonadherence with course c/b acute renal failure and severe hypothyroidism (TSH 400s on admission). Course c/b +COVID but saturating well on RA.    Reports no ankle pain. Swelling about the same. Denies chest pain, sob.    #Bipolar disorder w/ psychosis - C/w cogentin and risperidone per psych recs  #Hypothyroidism - c/w synthroid 125mcg per Endo recs. Repeat TSH and Free T4 -->6.49 and 1.6 on . C/w current dose and repeat in 4-6 weeks  #COVID-19 - positive since 2/3. DC'ed on isolation on   #Pedal edema/LE edema: no JVD noted. Vitals stable. Ambulatory. Encourage elevation of LE while sitting. Dimer wnl. Continue to monitor.     Dispo: Pending guardianship. Prolonged DC planning for placement .

## 2023-02-21 NOTE — PROGRESS NOTE ADULT - SUBJECTIVE AND OBJECTIVE BOX
PROGRESS NOTE:   Authored by: Bal Liang M.D.   Internal Medicine PGY-1  Please Contact Via Teams    Patient is a 53y old  Female who presents with a chief complaint of behavioral change; abnormal TFT (20 Feb 2023 07:00)      SUBJECTIVE / OVERNIGHT EVENTS:  No acute events overnight.     Brief Daily Plan:    MEDICATIONS  (STANDING):  benztropine 0.5 milliGRAM(s) Oral two times a day  levothyroxine 125 MICROGram(s) Oral daily  lidocaine   4% Patch 1 Patch Transdermal daily  polyethylene glycol 3350 17 Gram(s) Oral daily  risperiDONE   Tablet 1 milliGRAM(s) Oral <User Schedule>  risperiDONE   Tablet 1 milliGRAM(s) Oral <User Schedule>  senna 2 Tablet(s) Oral at bedtime    MEDICATIONS  (PRN):  acetaminophen     Tablet .. 650 milliGRAM(s) Oral every 6 hours PRN Temp greater or equal to 38C (100.4F), Mild Pain (1 - 3)  fluticasone propionate 50 MICROgram(s)/spray Nasal Spray 1 Spray(s) Both Nostrils two times a day PRN Congestion  guaiFENesin Oral Liquid (Sugar-Free) 100 milliGRAM(s) Oral every 6 hours PRN Cough  haloperidol    Injectable 2 milliGRAM(s) IV Push every 6 hours PRN Agitation      CAPILLARY BLOOD GLUCOSE        I&O's Summary      PHYSICAL EXAM:  Vital Signs Last 24 Hrs  T(C): 36.5 (20 Feb 2023 21:53), Max: 36.5 (20 Feb 2023 07:38)  T(F): 97.7 (20 Feb 2023 21:53), Max: 97.7 (20 Feb 2023 07:38)  HR: 73 (20 Feb 2023 21:53) (65 - 73)  BP: 121/84 (20 Feb 2023 21:53) (115/71 - 122/72)  BP(mean): --  RR: 18 (20 Feb 2023 12:55) (17 - 18)  SpO2: 97% (20 Feb 2023 21:53) (97% - 100%)    Parameters below as of 20 Feb 2023 21:53  Patient On (Oxygen Delivery Method): room air        CONSTITUTIONAL: NAD, well-developed  RESPIRATORY: Normal respiratory effort; lungs are clear to auscultation bilaterally  CARDIOVASCULAR: Regular rate and rhythm, normal S1 and S2, no murmur/rub/gallop; No lower extremity edema; Peripheral pulses are 2+ bilaterally  ABDOMEN: Nontender to palpation, normoactive bowel sounds, no rebound/guarding; No hepatosplenomegaly  MUSCLOSKELETAL: no clubbing or cyanosis of digits; no joint swelling or tenderness to palpation  PSYCH: A+O to person, place, and time; affect appropriate    LABS:                        9.4    5.56  )-----------( 220      ( 20 Feb 2023 05:45 )             30.5     02-20    138  |  106  |  30<H>  ----------------------------<  97  4.0   |  21<L>  |  1.35<H>    Ca    9.1      20 Feb 2023 05:45  Phos  4.0     02-20  Mg     1.90     02-20    TPro  6.3  /  Alb  3.7  /  TBili  <0.2  /  DBili  x   /  AST  13  /  ALT  10  /  AlkPhos  101  02-20                Tele Reviewed:    RADIOLOGY & ADDITIONAL TESTS:  Results Reviewed:   Imaging Personally Reviewed:  Electrocardiogram Personally Reviewed: PROGRESS NOTE:   Authored by: Bal Liang M.D.   Internal Medicine PGY-1  Please Contact Via Teams    Patient is a 53y old  Female who presents with a chief complaint of behavioral change; abnormal TFT (20 Feb 2023 07:00)      SUBJECTIVE / OVERNIGHT EVENTS:  No acute events overnight. Patient feeling well this morning with no complaints. Seen eating breakfast at bedside. States that she had a BM.     MEDICATIONS  (STANDING):  benztropine 0.5 milliGRAM(s) Oral two times a day  levothyroxine 125 MICROGram(s) Oral daily  lidocaine   4% Patch 1 Patch Transdermal daily  polyethylene glycol 3350 17 Gram(s) Oral daily  risperiDONE   Tablet 1 milliGRAM(s) Oral <User Schedule>  risperiDONE   Tablet 1 milliGRAM(s) Oral <User Schedule>  senna 2 Tablet(s) Oral at bedtime    MEDICATIONS  (PRN):  acetaminophen     Tablet .. 650 milliGRAM(s) Oral every 6 hours PRN Temp greater or equal to 38C (100.4F), Mild Pain (1 - 3)  fluticasone propionate 50 MICROgram(s)/spray Nasal Spray 1 Spray(s) Both Nostrils two times a day PRN Congestion  guaiFENesin Oral Liquid (Sugar-Free) 100 milliGRAM(s) Oral every 6 hours PRN Cough  haloperidol    Injectable 2 milliGRAM(s) IV Push every 6 hours PRN Agitation      CAPILLARY BLOOD GLUCOSE        I&O's Summary      PHYSICAL EXAM:  Vital Signs Last 24 Hrs  T(C): 36.5 (20 Feb 2023 21:53), Max: 36.5 (20 Feb 2023 07:38)  T(F): 97.7 (20 Feb 2023 21:53), Max: 97.7 (20 Feb 2023 07:38)  HR: 73 (20 Feb 2023 21:53) (65 - 73)  BP: 121/84 (20 Feb 2023 21:53) (115/71 - 122/72)  BP(mean): --  RR: 18 (20 Feb 2023 12:55) (17 - 18)  SpO2: 97% (20 Feb 2023 21:53) (97% - 100%)    Parameters below as of 20 Feb 2023 21:53  Patient On (Oxygen Delivery Method): room air        CONSTITUTIONAL: NAD, well-developed  RESPIRATORY: Normal respiratory effort; lungs are clear to auscultation bilaterally  CARDIOVASCULAR: Regular rate and rhythm, normal S1 and S2, no murmurs. 1+ pitting edema b/l LE  ABDOMEN: Nontender to palpation, normoactive bowel sounds, no rebound/guarding  PSYCH: No insight into her condition or why she originally came here.    LABS:                        9.4    5.56  )-----------( 220      ( 20 Feb 2023 05:45 )             30.5     02-20    138  |  106  |  30<H>  ----------------------------<  97  4.0   |  21<L>  |  1.35<H>    Ca    9.1      20 Feb 2023 05:45  Phos  4.0     02-20  Mg     1.90     02-20    TPro  6.3  /  Alb  3.7  /  TBili  <0.2  /  DBili  x   /  AST  13  /  ALT  10  /  AlkPhos  101  02-20                Tele Reviewed:    RADIOLOGY & ADDITIONAL TESTS:  Results Reviewed:   Imaging Personally Reviewed:  Electrocardiogram Personally Reviewed:

## 2023-02-21 NOTE — PROGRESS NOTE ADULT - ASSESSMENT
52F with bipolar disorder, hypothyroidism, admitted for acute encephalopathy most likely in the setting of psychosis due to lithium nonadherence with course complicated by acute renal failure and hypothyroidism, now resolved. Now on risperidone and pending placement to group home. Course c/b mildly symptomatic COVID, monitored off treatment. 52F with bipolar disorder, hypothyroidism, admitted for acute encephalopathy most likely in the setting of psychosis due to lithium nonadherence with course complicated by acute renal failure and hypothyroidism, now resolved. Now on risperidone and pending placement to group home + guardianship. Course c/b mildly symptomatic COVID, monitored off treatment.

## 2023-02-21 NOTE — PROGRESS NOTE ADULT - PROBLEM SELECTOR PLAN 4
In the setting of her diabetes. A1c now within normal range.  - BMP weekly  - remains at baseline SCr. CKD 3A  - d/c metformin on discharge In the setting of her diabetes. A1c now within normal range.  - BMP weekly, remains at baseline SCr. CKD 3A  - d/c metformin on discharge

## 2023-02-22 PROCEDURE — 93010 ELECTROCARDIOGRAM REPORT: CPT

## 2023-02-22 PROCEDURE — 99232 SBSQ HOSP IP/OBS MODERATE 35: CPT | Mod: GC

## 2023-02-22 RX ADMIN — LIDOCAINE 1 PATCH: 4 CREAM TOPICAL at 18:21

## 2023-02-22 RX ADMIN — SENNA PLUS 2 TABLET(S): 8.6 TABLET ORAL at 21:54

## 2023-02-22 RX ADMIN — LIDOCAINE 1 PATCH: 4 CREAM TOPICAL at 12:07

## 2023-02-22 RX ADMIN — Medication 0.5 MILLIGRAM(S): at 18:15

## 2023-02-22 RX ADMIN — RISPERIDONE 1 MILLIGRAM(S): 4 TABLET ORAL at 20:26

## 2023-02-22 RX ADMIN — Medication 1 SPRAY(S): at 21:43

## 2023-02-22 RX ADMIN — RISPERIDONE 1 MILLIGRAM(S): 4 TABLET ORAL at 06:33

## 2023-02-22 RX ADMIN — Medication 0.5 MILLIGRAM(S): at 06:32

## 2023-02-22 RX ADMIN — Medication 125 MICROGRAM(S): at 06:32

## 2023-02-22 NOTE — PROGRESS NOTE ADULT - SUBJECTIVE AND OBJECTIVE BOX
PROGRESS NOTE:   Authored by: Bal Liang M.D.   Internal Medicine PGY-1  Please Contact Via Teams    Patient is a 53y old  Female who presents with a chief complaint of behavioral change; abnormal TFT (21 Feb 2023 07:04)      SUBJECTIVE / OVERNIGHT EVENTS:  No acute events overnight.     Brief Daily Plan:    MEDICATIONS  (STANDING):  benztropine 0.5 milliGRAM(s) Oral two times a day  levothyroxine 125 MICROGram(s) Oral daily  lidocaine   4% Patch 1 Patch Transdermal daily  polyethylene glycol 3350 17 Gram(s) Oral daily  risperiDONE   Tablet 1 milliGRAM(s) Oral <User Schedule>  risperiDONE   Tablet 1 milliGRAM(s) Oral <User Schedule>  senna 2 Tablet(s) Oral at bedtime    MEDICATIONS  (PRN):  acetaminophen     Tablet .. 650 milliGRAM(s) Oral every 6 hours PRN Temp greater or equal to 38C (100.4F), Mild Pain (1 - 3)  fluticasone propionate 50 MICROgram(s)/spray Nasal Spray 1 Spray(s) Both Nostrils two times a day PRN Congestion  guaiFENesin Oral Liquid (Sugar-Free) 100 milliGRAM(s) Oral every 6 hours PRN Cough  haloperidol    Injectable 2 milliGRAM(s) IV Push every 6 hours PRN Agitation      CAPILLARY BLOOD GLUCOSE        I&O's Summary      PHYSICAL EXAM:  Vital Signs Last 24 Hrs  T(C): 36.4 (22 Feb 2023 05:32), Max: 36.6 (21 Feb 2023 21:25)  T(F): 97.5 (22 Feb 2023 05:32), Max: 97.8 (21 Feb 2023 21:25)  HR: 74 (22 Feb 2023 05:32) (63 - 75)  BP: 124/80 (22 Feb 2023 05:32) (118/74 - 143/83)  BP(mean): --  RR: 17 (22 Feb 2023 05:32) (17 - 18)  SpO2: 100% (22 Feb 2023 05:32) (100% - 100%)    Parameters below as of 22 Feb 2023 05:32  Patient On (Oxygen Delivery Method): room air        CONSTITUTIONAL: NAD, well-developed  RESPIRATORY: Normal respiratory effort; lungs are clear to auscultation bilaterally  CARDIOVASCULAR: Regular rate and rhythm, normal S1 and S2, no murmur/rub/gallop; No lower extremity edema; Peripheral pulses are 2+ bilaterally  ABDOMEN: Nontender to palpation, normoactive bowel sounds, no rebound/guarding; No hepatosplenomegaly  MUSCLOSKELETAL: no clubbing or cyanosis of digits; no joint swelling or tenderness to palpation  PSYCH: A+O to person, place, and time; affect appropriate    LABS:                      Tele Reviewed:    RADIOLOGY & ADDITIONAL TESTS:  Results Reviewed:   Imaging Personally Reviewed:  Electrocardiogram Personally Reviewed: PROGRESS NOTE:   Authored by: Bal Liang M.D.   Internal Medicine PGY-1  Please Contact Via Teams    Patient is a 53y old  Female who presents with a chief complaint of behavioral change; abnormal TFT (21 Feb 2023 07:04)      SUBJECTIVE / OVERNIGHT EVENTS:  No acute events overnight. Patient feeling well this morning. Seen walking around the room. Has no complaints.    MEDICATIONS  (STANDING):  benztropine 0.5 milliGRAM(s) Oral two times a day  levothyroxine 125 MICROGram(s) Oral daily  lidocaine   4% Patch 1 Patch Transdermal daily  polyethylene glycol 3350 17 Gram(s) Oral daily  risperiDONE   Tablet 1 milliGRAM(s) Oral <User Schedule>  risperiDONE   Tablet 1 milliGRAM(s) Oral <User Schedule>  senna 2 Tablet(s) Oral at bedtime    MEDICATIONS  (PRN):  acetaminophen     Tablet .. 650 milliGRAM(s) Oral every 6 hours PRN Temp greater or equal to 38C (100.4F), Mild Pain (1 - 3)  fluticasone propionate 50 MICROgram(s)/spray Nasal Spray 1 Spray(s) Both Nostrils two times a day PRN Congestion  guaiFENesin Oral Liquid (Sugar-Free) 100 milliGRAM(s) Oral every 6 hours PRN Cough  haloperidol    Injectable 2 milliGRAM(s) IV Push every 6 hours PRN Agitation      CAPILLARY BLOOD GLUCOSE        I&O's Summary      PHYSICAL EXAM:  Vital Signs Last 24 Hrs  T(C): 36.4 (22 Feb 2023 05:32), Max: 36.6 (21 Feb 2023 21:25)  T(F): 97.5 (22 Feb 2023 05:32), Max: 97.8 (21 Feb 2023 21:25)  HR: 74 (22 Feb 2023 05:32) (63 - 75)  BP: 124/80 (22 Feb 2023 05:32) (118/74 - 143/83)  BP(mean): --  RR: 17 (22 Feb 2023 05:32) (17 - 18)  SpO2: 100% (22 Feb 2023 05:32) (100% - 100%)    Parameters below as of 22 Feb 2023 05:32  Patient On (Oxygen Delivery Method): room air      CONSTITUTIONAL: NAD, well-developed  RESPIRATORY: Normal respiratory effort; lungs are clear to auscultation bilaterally  CARDIOVASCULAR: Regular rate and rhythm, normal S1 and S2, no murmurs. 1+ pitting edema b/l LE  ABDOMEN: Nontender to palpation, normoactive bowel sounds, no rebound/guarding  PSYCH: No insight into her condition or why she originally came here.

## 2023-02-22 NOTE — PROGRESS NOTE ADULT - ATTENDING COMMENTS
52F previously domiciled with recently  mother PMH bipolar disorder, hypothyroidism BIBEMS for abnormal behavior reported by cousin. Admitted for acute encephalopathy in the setting of psychosis due to lithium nonadherence with course c/b acute renal failure and severe hypothyroidism (TSH 400s on admission). Course c/b +COVID but saturating well on RA.    Reports no ankle pain. Swelling better. Denies chest pain, sob.    #Bipolar disorder w/ psychosis - C/w cogentin and risperidone per psych recs  #Hypothyroidism - c/w synthroid 125mcg per Endo recs. Repeat TSH and Free T4 -->6.49 and 1.6 on . C/w current dose and repeat in 4-6 weeks  #COVID-19 - positive since 2/3. DC'ed on isolation on   #Pedal edema/LE edema: no JVD noted. Vitals stable. Ambulatory. Encourage elevation of LE while sitting. Dimer wnl. Continue to monitor.     Dispo: Pending guardianship. Prolonged DC planning for placement .

## 2023-02-22 NOTE — PROGRESS NOTE ADULT - PROBLEM SELECTOR PLAN 3
- No BM since last week according to patient  - Senna started 2/19, miralax 2/20. Since she had a BM, continue daily miralax

## 2023-02-22 NOTE — PROGRESS NOTE ADULT - PROBLEM SELECTOR PLAN 4
In the setting of her diabetes. A1c now within normal range.  - BMP weekly, remains at baseline SCr. CKD 3A  - d/c metformin on discharge

## 2023-02-22 NOTE — PROGRESS NOTE ADULT - ASSESSMENT
52F with bipolar disorder, hypothyroidism, admitted for acute encephalopathy most likely in the setting of psychosis due to lithium nonadherence with course complicated by acute renal failure and hypothyroidism, now resolved. Now on risperidone and pending placement to group home + guardianship. Course c/b mildly symptomatic COVID, monitored off treatment. (2) cough or sneeze

## 2023-02-23 PROCEDURE — 99232 SBSQ HOSP IP/OBS MODERATE 35: CPT | Mod: GC

## 2023-02-23 RX ADMIN — LIDOCAINE 1 PATCH: 4 CREAM TOPICAL at 12:01

## 2023-02-23 RX ADMIN — Medication 125 MICROGRAM(S): at 06:03

## 2023-02-23 RX ADMIN — RISPERIDONE 1 MILLIGRAM(S): 4 TABLET ORAL at 21:14

## 2023-02-23 RX ADMIN — Medication 0.5 MILLIGRAM(S): at 19:00

## 2023-02-23 RX ADMIN — Medication 0.5 MILLIGRAM(S): at 06:03

## 2023-02-23 RX ADMIN — SENNA PLUS 2 TABLET(S): 8.6 TABLET ORAL at 21:14

## 2023-02-23 RX ADMIN — RISPERIDONE 1 MILLIGRAM(S): 4 TABLET ORAL at 06:44

## 2023-02-23 RX ADMIN — POLYETHYLENE GLYCOL 3350 17 GRAM(S): 17 POWDER, FOR SOLUTION ORAL at 12:02

## 2023-02-23 RX ADMIN — LIDOCAINE 1 PATCH: 4 CREAM TOPICAL at 20:08

## 2023-02-23 RX ADMIN — LIDOCAINE 1 PATCH: 4 CREAM TOPICAL at 00:15

## 2023-02-23 NOTE — PROGRESS NOTE ADULT - ASSESSMENT
52F with bipolar disorder, hypothyroidism, admitted for acute encephalopathy most likely in the setting of psychosis due to lithium nonadherence with course complicated by acute renal failure and hypothyroidism, now resolved. Now on risperidone and pending placement to group home + guardianship. Course c/b mildly symptomatic COVID, monitored off treatment.

## 2023-02-23 NOTE — CHART NOTE - NSCHARTNOTEFT_GEN_A_CORE
Source: Patient [ x]    Family [ ]     other [x ] chart review    Patient seen for nutrition f/u. 52 year old female with a PMH of bipolar disorder, hypothyroidism, admitted for acute encephalopathy most likely in the setting of psychosis due to lithium nonadherence with course complicated by acute renal failure and hypothyroidism, now resolved. Pending placement per chart.    Patient seen at bedside. Reports good appetite. Unable to assess PO intake at this time per chart. States having a bowel movement this morning, on bowel regimen per chart. Food preferences reviewed. No edema or pressure injuries noted per RN flow sheet. Reviewed nutrition therapy for constipation.    Diet : Consistent carbohydrate (evening snack), DASH/TLC    Current Weight: 68.9 kg (2/22)  61 kg (1/18)  59.4 kg (1/13)  Weight Change: likely due to limited mobility, will monitor    Pertinent Medications: MEDICATIONS  (STANDING):  benztropine 0.5 milliGRAM(s) Oral two times a day  levothyroxine 125 MICROGram(s) Oral daily  lidocaine   4% Patch 1 Patch Transdermal daily  polyethylene glycol 3350 17 Gram(s) Oral daily  risperiDONE   Tablet 1 milliGRAM(s) Oral <User Schedule>  risperiDONE   Tablet 1 milliGRAM(s) Oral <User Schedule>  senna 2 Tablet(s) Oral at bedtime    MEDICATIONS  (PRN):  acetaminophen     Tablet .. 650 milliGRAM(s) Oral every 6 hours PRN Temp greater or equal to 38C (100.4F), Mild Pain (1 - 3)  fluticasone propionate 50 MICROgram(s)/spray Nasal Spray 1 Spray(s) Both Nostrils two times a day PRN Congestion  guaiFENesin Oral Liquid (Sugar-Free) 100 milliGRAM(s) Oral every 6 hours PRN Cough  haloperidol    Injectable 2 milliGRAM(s) IV Push every 6 hours PRN Agitation    Pertinent Labs: no new labs to assess    Estimated Needs:   [ x] no change since previous assessment     Previous Nutrition Diagnosis: Inadequate oral intake    Nutrition Diagnosis is [x ] ongoing  [ ] resolved [ ] not applicable     Recommend  - continue diet as ordered  - if PO intake were to change, consider Glucerna supplementation  - obtain weekly weight and document PO intake to monitor trend     Monitoring and Evaluation:   [x ] PO intake [x ] Tolerance to diet prescription [x ] weights [x ] follow up per protocol

## 2023-02-23 NOTE — PROGRESS NOTE ADULT - SUBJECTIVE AND OBJECTIVE BOX
Emmanuelle Aviles MD  EM/IM PGY-2  Pager [phone] or TEAMS  -----------------------------------------    ================== UNFINISHED NOTE =======================    INTERVAL HPI/OVERNIGHT EVENTS: No acute events overnight, vitals remained stable. All scheduled medications given, No PRN medications required.     SUBJECTIVE: Patient seen and examined at bedside.     VITAL SIGNS:  T(C): 36.6 (02-23-23 @ 05:06), Max: 36.6 (02-22-23 @ 21:52)  HR: 72 (02-23-23 @ 05:06) (72 - 78)  BP: 109/73 (02-23-23 @ 05:06) (109/73 - 128/79)  RR: 16 (02-23-23 @ 05:06) (16 - 18)  SpO2: 100% (02-23-23 @ 05:06) (98% - 100%)    PHYSICAL EXAM:        MEDICATIONS:  MEDICATIONS  (STANDING):  benztropine 0.5 milliGRAM(s) Oral two times a day  levothyroxine 125 MICROGram(s) Oral daily  lidocaine   4% Patch 1 Patch Transdermal daily  polyethylene glycol 3350 17 Gram(s) Oral daily  risperiDONE   Tablet 1 milliGRAM(s) Oral <User Schedule>  risperiDONE   Tablet 1 milliGRAM(s) Oral <User Schedule>  senna 2 Tablet(s) Oral at bedtime    MEDICATIONS  (PRN):  acetaminophen     Tablet .. 650 milliGRAM(s) Oral every 6 hours PRN Temp greater or equal to 38C (100.4F), Mild Pain (1 - 3)  fluticasone propionate 50 MICROgram(s)/spray Nasal Spray 1 Spray(s) Both Nostrils two times a day PRN Congestion  guaiFENesin Oral Liquid (Sugar-Free) 100 milliGRAM(s) Oral every 6 hours PRN Cough  haloperidol    Injectable 2 milliGRAM(s) IV Push every 6 hours PRN Agitation      LABS:                 Emmanuelle Aviles MD  EM/IM PGY-2  Pager [phone] or TEAMS  -----------------------------------------      INTERVAL HPI/OVERNIGHT EVENTS: No acute events overnight, vitals remained stable. All scheduled medications given, No PRN medications required.     SUBJECTIVE: Patient seen and examined at bedside. No concerns, no trouble walking, breathing, no pain.    VITAL SIGNS:  T(C): 36.6 (02-23-23 @ 05:06), Max: 36.6 (02-22-23 @ 21:52)  HR: 72 (02-23-23 @ 05:06) (72 - 78)  BP: 109/73 (02-23-23 @ 05:06) (109/73 - 128/79)  RR: 16 (02-23-23 @ 05:06) (16 - 18)  SpO2: 100% (02-23-23 @ 05:06) (98% - 100%)    PHYSICAL EXAM:  GENERAL: Lying comfortably in bed in no acute distress  NEURO: Alert and Oriented to person, place, date and situation. Pupils symmetric, No ptosis. No facial asymmetry or dysarthria, no tremor noted.  HEENT: No conjunctival injection or scleral icterus.   CARD: Normal rate and regular rhythm, no murmurs and no gallops appreciated.  RESP: Clear to auscultation bilaterally, No wheezes, rales or rhonchi. Good respiratory effort.  ABD: Nondistended, Soft and nontender to palpation in all quadrants, no guarding, no rigidity.   EXT: mild nonpitting ankle edema bilaterally, nontender non erythematous.        MEDICATIONS:  MEDICATIONS  (STANDING):  benztropine 0.5 milliGRAM(s) Oral two times a day  levothyroxine 125 MICROGram(s) Oral daily  lidocaine   4% Patch 1 Patch Transdermal daily  polyethylene glycol 3350 17 Gram(s) Oral daily  risperiDONE   Tablet 1 milliGRAM(s) Oral <User Schedule>  risperiDONE   Tablet 1 milliGRAM(s) Oral <User Schedule>  senna 2 Tablet(s) Oral at bedtime    MEDICATIONS  (PRN):  acetaminophen     Tablet .. 650 milliGRAM(s) Oral every 6 hours PRN Temp greater or equal to 38C (100.4F), Mild Pain (1 - 3)  fluticasone propionate 50 MICROgram(s)/spray Nasal Spray 1 Spray(s) Both Nostrils two times a day PRN Congestion  guaiFENesin Oral Liquid (Sugar-Free) 100 milliGRAM(s) Oral every 6 hours PRN Cough  haloperidol    Injectable 2 milliGRAM(s) IV Push every 6 hours PRN Agitation      LABS: None

## 2023-02-24 PROCEDURE — 99232 SBSQ HOSP IP/OBS MODERATE 35: CPT

## 2023-02-24 RX ADMIN — Medication 125 MICROGRAM(S): at 05:06

## 2023-02-24 RX ADMIN — RISPERIDONE 1 MILLIGRAM(S): 4 TABLET ORAL at 05:05

## 2023-02-24 RX ADMIN — RISPERIDONE 1 MILLIGRAM(S): 4 TABLET ORAL at 21:59

## 2023-02-24 RX ADMIN — Medication 0.5 MILLIGRAM(S): at 05:06

## 2023-02-24 RX ADMIN — LIDOCAINE 1 PATCH: 4 CREAM TOPICAL at 11:20

## 2023-02-24 RX ADMIN — LIDOCAINE 1 PATCH: 4 CREAM TOPICAL at 00:03

## 2023-02-24 RX ADMIN — Medication 0.5 MILLIGRAM(S): at 17:06

## 2023-02-24 RX ADMIN — LIDOCAINE 1 PATCH: 4 CREAM TOPICAL at 23:30

## 2023-02-24 RX ADMIN — LIDOCAINE 1 PATCH: 4 CREAM TOPICAL at 19:00

## 2023-02-24 RX ADMIN — POLYETHYLENE GLYCOL 3350 17 GRAM(S): 17 POWDER, FOR SOLUTION ORAL at 11:20

## 2023-02-24 RX ADMIN — SENNA PLUS 2 TABLET(S): 8.6 TABLET ORAL at 22:00

## 2023-02-24 NOTE — PROGRESS NOTE ADULT - SUBJECTIVE AND OBJECTIVE BOX
Sultan Kevin MD  PGY 1 Department of Internal Medicine        Patient is a 53y old  Female who presents with a chief complaint of behavioral change; abnormal TFT (23 Feb 2023 06:28)      SUBJECTIVE / OVERNIGHT EVENTS: Pt seen and examined. No acute overnight events. Denies fevers, chills, CP, SOB, Abdominal pain, N/V, Constipation, Diarrhea        MEDICATIONS  (STANDING):  benztropine 0.5 milliGRAM(s) Oral two times a day  levothyroxine 125 MICROGram(s) Oral daily  lidocaine   4% Patch 1 Patch Transdermal daily  polyethylene glycol 3350 17 Gram(s) Oral daily  risperiDONE   Tablet 1 milliGRAM(s) Oral <User Schedule>  risperiDONE   Tablet 1 milliGRAM(s) Oral <User Schedule>  senna 2 Tablet(s) Oral at bedtime    MEDICATIONS  (PRN):  acetaminophen     Tablet .. 650 milliGRAM(s) Oral every 6 hours PRN Temp greater or equal to 38C (100.4F), Mild Pain (1 - 3)  fluticasone propionate 50 MICROgram(s)/spray Nasal Spray 1 Spray(s) Both Nostrils two times a day PRN Congestion  guaiFENesin Oral Liquid (Sugar-Free) 100 milliGRAM(s) Oral every 6 hours PRN Cough  haloperidol    Injectable 2 milliGRAM(s) IV Push every 6 hours PRN Agitation      I&O's Summary      Vital Signs Last 24 Hrs  T(C): 36.4 (24 Feb 2023 05:02), Max: 36.4 (23 Feb 2023 21:41)  T(F): 97.6 (24 Feb 2023 05:02), Max: 97.6 (23 Feb 2023 21:41)  HR: 62 (24 Feb 2023 05:02) (62 - 72)  BP: 118/67 (24 Feb 2023 05:02) (105/59 - 132/88)  BP(mean): --  RR: 16 (24 Feb 2023 05:02) (16 - 17)  SpO2: 100% (24 Feb 2023 05:02) (100% - 100%)    Parameters below as of 24 Feb 2023 05:02  Patient On (Oxygen Delivery Method): room air        CAPILLARY BLOOD GLUCOSE          PHYSICAL EXAM:  GENERAL: Lying comfortably in bed in no acute distress  NEURO: Alert and Oriented to person, place, date and situation. Pupils symmetric, No ptosis. No facial asymmetry or dysarthria, no tremor noted.  HEENT: No conjunctival injection or scleral icterus.   CARD: Normal rate and regular rhythm, no murmurs and no gallops appreciated.  RESP: Clear to auscultation bilaterally, No wheezes, rales or rhonchi. Good respiratory effort.  ABD: Nondistended, Soft and nontender to palpation in all quadrants, no guarding, no rigidity.   EXT: mild nonpitting ankle edema bilaterally, nontender non erythematous.         LABS:                      RADIOLOGY & ADDITIONAL TESTS:    Imaging Personally Reviewed:    Consultant(s) Notes Reviewed:      Care Discussed with Consultants/Other Providers:   Sultan Kevin MD  PGY 1 Department of Internal Medicine        Patient is a 53y old  Female who presents with a chief complaint of behavioral change; abnormal TFT (23 Feb 2023 06:28)      SUBJECTIVE / OVERNIGHT EVENTS: Pt seen and examined. No acute overnight events. Denies fevers, chills, CP, SOB, Abdominal pain, N/V, Constipation, Diarrhea. Feels well overall, noting BM this morning. Has mild left ankle pain.         MEDICATIONS  (STANDING):  benztropine 0.5 milliGRAM(s) Oral two times a day  levothyroxine 125 MICROGram(s) Oral daily  lidocaine   4% Patch 1 Patch Transdermal daily  polyethylene glycol 3350 17 Gram(s) Oral daily  risperiDONE   Tablet 1 milliGRAM(s) Oral <User Schedule>  risperiDONE   Tablet 1 milliGRAM(s) Oral <User Schedule>  senna 2 Tablet(s) Oral at bedtime    MEDICATIONS  (PRN):  acetaminophen     Tablet .. 650 milliGRAM(s) Oral every 6 hours PRN Temp greater or equal to 38C (100.4F), Mild Pain (1 - 3)  fluticasone propionate 50 MICROgram(s)/spray Nasal Spray 1 Spray(s) Both Nostrils two times a day PRN Congestion  guaiFENesin Oral Liquid (Sugar-Free) 100 milliGRAM(s) Oral every 6 hours PRN Cough  haloperidol    Injectable 2 milliGRAM(s) IV Push every 6 hours PRN Agitation      I&O's Summary      Vital Signs Last 24 Hrs  T(C): 36.4 (24 Feb 2023 05:02), Max: 36.4 (23 Feb 2023 21:41)  T(F): 97.6 (24 Feb 2023 05:02), Max: 97.6 (23 Feb 2023 21:41)  HR: 62 (24 Feb 2023 05:02) (62 - 72)  BP: 118/67 (24 Feb 2023 05:02) (105/59 - 132/88)  BP(mean): --  RR: 16 (24 Feb 2023 05:02) (16 - 17)  SpO2: 100% (24 Feb 2023 05:02) (100% - 100%)    Parameters below as of 24 Feb 2023 05:02  Patient On (Oxygen Delivery Method): room air        CAPILLARY BLOOD GLUCOSE          PHYSICAL EXAM:  GENERAL: Ambulating at time of encounter. NAD  NEURO: Alert and Oriented to person, place, date and situation. Pupils symmetric, No ptosis. No facial asymmetry or dysarthria, no tremor noted.  HEENT: No conjunctival injection or scleral icterus.   CARD: Normal rate and regular rhythm, no murmurs and no gallops appreciated.  RESP: Clear to auscultation bilaterally, No wheezes, rales or rhonchi. Good respiratory effort.  ABD: Nondistended, Soft and nontender to palpation in all quadrants, no guarding, no rigidity.   EXT: mild nonpitting ankle edema bilaterally, nontender non erythematous.         LABS:                      RADIOLOGY & ADDITIONAL TESTS:    Imaging Personally Reviewed:    Consultant(s) Notes Reviewed:      Care Discussed with Consultants/Other Providers:

## 2023-02-24 NOTE — PROGRESS NOTE ADULT - ATTENDING COMMENTS
52F previously domiciled with recently  mother PMH bipolar disorder, hypothyroidism BIBEMS for abnormal behavior reported by cousin. Admitted for acute encephalopathy in the setting of psychosis due to lithium nonadherence with course c/b acute renal failure and severe hypothyroidism (TSH 400s on admission). Course c/b +COVID but saturating well on RA.    Reports no ankle pain. Swelling the same. Denies chest pain, sob.    #Bipolar disorder w/ psychosis - C/w cogentin and risperidone per psych recs  #Hypothyroidism - c/w synthroid 125mcg per Endo recs. Repeat TSH and Free T4 -->6.49 and 1.6 on . C/w current dose and repeat in 4-6 weeks  #COVID-19 - positive since 2/3. DC'ed on isolation on   #Pedal edema/LE edema: no JVD noted. Vitals stable. Ambulatory. Encourage elevation of LE while sitting. Dimer wnl. Continue to monitor.     Dispo: Pending guardianship. Prolonged DC planning for placement .

## 2023-02-24 NOTE — PROGRESS NOTE ADULT - PROBLEM SELECTOR PLAN 1
She has bipolar disorder previously on lithium but unclear adherence prior to admission as serum levels low.  - Risperidone 1 mg bid (7 am & 8 pm). Benztropine 0.5 bid for EPS  - Haldol 2 mg IV/IM/p.o. q6h prn for agitation/aggression (last required x1 on )  - QTc 430s after increasing risperidone dose.  - weekly EK/14 QTc 436  - Psychiatry following, recs appreciated She has bipolar disorder previously on lithium but unclear adherence prior to admission as serum levels low.  - Risperidone 1 mg bid (7 am & 8 pm). Benztropine 0.5 bid for EPS  - Haldol 2 mg IV/IM/p.o. q6h prn for agitation/aggression (last required x1 on )  - QTc 430s after increasing risperidone dose.  - weekly EK/22 QTc < 400  - Psychiatry following, recs appreciated

## 2023-02-24 NOTE — PROGRESS NOTE ADULT - PROBLEM SELECTOR PLAN 2
Reported to be on Synthroid 125 mcg q.d., likely also non-adherent, with TSH of 410. Initially presenting with some lethargy. Completed steroid taper 1/15 due to concern for adrenal insufficiency.  - Levothyroxine 125 mcg  - Repeat TSH 6.49 and fT4 1.6 on 2/13, about 1 month from after restarting synthroid. Can follow up outpatient, or if still here would recheck in 4 weeks. Reported to be on Synthroid 125 mcg q.d., likely also non-adherent, with TSH of 410. Initially presenting with some lethargy. Completed steroid taper 1/15 due to concern for adrenal insufficiency.  - Levothyroxine 125 mcg  - Repeat TSH 6.49 and fT4 1.6 on 2/13, about 1 month from after restarting synthroid. Can follow up outpatient, or if still here would recheck in 4 weeks (3/13)

## 2023-02-25 PROCEDURE — 99232 SBSQ HOSP IP/OBS MODERATE 35: CPT | Mod: GC

## 2023-02-25 RX ADMIN — POLYETHYLENE GLYCOL 3350 17 GRAM(S): 17 POWDER, FOR SOLUTION ORAL at 11:09

## 2023-02-25 RX ADMIN — Medication 0.5 MILLIGRAM(S): at 18:11

## 2023-02-25 RX ADMIN — SENNA PLUS 2 TABLET(S): 8.6 TABLET ORAL at 21:59

## 2023-02-25 RX ADMIN — Medication 0.5 MILLIGRAM(S): at 05:31

## 2023-02-25 RX ADMIN — RISPERIDONE 1 MILLIGRAM(S): 4 TABLET ORAL at 21:02

## 2023-02-25 RX ADMIN — Medication 125 MICROGRAM(S): at 05:31

## 2023-02-25 NOTE — PROGRESS NOTE ADULT - SUBJECTIVE AND OBJECTIVE BOX
Sultan Kevin MD  PGY 1 Department of Internal Medicine        Patient is a 53y old  Female who presents with a chief complaint of behavioral change; abnormal TFT (24 Feb 2023 05:16)      SUBJECTIVE / OVERNIGHT EVENTS: Pt seen and examined. No acute overnight events. Denies fevers, chills, CP, SOB, Abdominal pain, N/V, Constipation, Diarrhea        MEDICATIONS  (STANDING):  benztropine 0.5 milliGRAM(s) Oral two times a day  levothyroxine 125 MICROGram(s) Oral daily  lidocaine   4% Patch 1 Patch Transdermal daily  polyethylene glycol 3350 17 Gram(s) Oral daily  risperiDONE   Tablet 1 milliGRAM(s) Oral <User Schedule>  risperiDONE   Tablet 1 milliGRAM(s) Oral <User Schedule>  senna 2 Tablet(s) Oral at bedtime    MEDICATIONS  (PRN):  acetaminophen     Tablet .. 650 milliGRAM(s) Oral every 6 hours PRN Temp greater or equal to 38C (100.4F), Mild Pain (1 - 3)  fluticasone propionate 50 MICROgram(s)/spray Nasal Spray 1 Spray(s) Both Nostrils two times a day PRN Congestion  guaiFENesin Oral Liquid (Sugar-Free) 100 milliGRAM(s) Oral every 6 hours PRN Cough  haloperidol    Injectable 2 milliGRAM(s) IV Push every 6 hours PRN Agitation      I&O's Summary      Vital Signs Last 24 Hrs  T(C): 36.9 (24 Feb 2023 21:03), Max: 36.9 (24 Feb 2023 21:03)  T(F): 98.5 (24 Feb 2023 21:03), Max: 98.5 (24 Feb 2023 21:03)  HR: 81 (24 Feb 2023 21:03) (74 - 81)  BP: 123/80 (24 Feb 2023 21:03) (123/80 - 130/84)  BP(mean): --  RR: 18 (24 Feb 2023 21:03) (17 - 18)  SpO2: 99% (24 Feb 2023 21:03) (99% - 100%)    Parameters below as of 24 Feb 2023 21:03  Patient On (Oxygen Delivery Method): room air        CAPILLARY BLOOD GLUCOSE          PHYSICAL EXAM:  GENERAL: Ambulating at time of encounter. NAD  NEURO: Alert and Oriented to person, place, date and situation. Pupils symmetric, No ptosis. No facial asymmetry or dysarthria, no tremor noted.  HEENT: No conjunctival injection or scleral icterus.   CARD: Normal rate and regular rhythm, no murmurs and no gallops appreciated.  RESP: Clear to auscultation bilaterally, No wheezes, rales or rhonchi. Good respiratory effort.  ABD: Nondistended, Soft and nontender to palpation in all quadrants, no guarding, no rigidity.   EXT: mild nonpitting ankle edema bilaterally, nontender non erythematous     LABS:                      RADIOLOGY & ADDITIONAL TESTS:    Imaging Personally Reviewed:    Consultant(s) Notes Reviewed:      Care Discussed with Consultants/Other Providers:   Sultan Kevin MD  PGY 1 Department of Internal Medicine        Patient is a 53y old  Female who presents with a chief complaint of behavioral change; abnormal TFT (24 Feb 2023 05:16)      SUBJECTIVE / OVERNIGHT EVENTS: Pt seen and examined. No acute overnight events. Denies fevers, chills, CP, SOB, Abdominal pain, N/V, Constipation, Diarrhea. Feels well overall.        MEDICATIONS  (STANDING):  benztropine 0.5 milliGRAM(s) Oral two times a day  levothyroxine 125 MICROGram(s) Oral daily  lidocaine   4% Patch 1 Patch Transdermal daily  polyethylene glycol 3350 17 Gram(s) Oral daily  risperiDONE   Tablet 1 milliGRAM(s) Oral <User Schedule>  risperiDONE   Tablet 1 milliGRAM(s) Oral <User Schedule>  senna 2 Tablet(s) Oral at bedtime    MEDICATIONS  (PRN):  acetaminophen     Tablet .. 650 milliGRAM(s) Oral every 6 hours PRN Temp greater or equal to 38C (100.4F), Mild Pain (1 - 3)  fluticasone propionate 50 MICROgram(s)/spray Nasal Spray 1 Spray(s) Both Nostrils two times a day PRN Congestion  guaiFENesin Oral Liquid (Sugar-Free) 100 milliGRAM(s) Oral every 6 hours PRN Cough  haloperidol    Injectable 2 milliGRAM(s) IV Push every 6 hours PRN Agitation      I&O's Summary      Vital Signs Last 24 Hrs  T(C): 36.9 (24 Feb 2023 21:03), Max: 36.9 (24 Feb 2023 21:03)  T(F): 98.5 (24 Feb 2023 21:03), Max: 98.5 (24 Feb 2023 21:03)  HR: 81 (24 Feb 2023 21:03) (74 - 81)  BP: 123/80 (24 Feb 2023 21:03) (123/80 - 130/84)  BP(mean): --  RR: 18 (24 Feb 2023 21:03) (17 - 18)  SpO2: 99% (24 Feb 2023 21:03) (99% - 100%)    Parameters below as of 24 Feb 2023 21:03  Patient On (Oxygen Delivery Method): room air        CAPILLARY BLOOD GLUCOSE          PHYSICAL EXAM:  GENERAL: NAD, sleeping comfortably  NEURO: Alert and Oriented to person, place, date and situation. Pupils symmetric, No ptosis. No facial asymmetry or dysarthria, no tremor noted.  HEENT: No conjunctival injection or scleral icterus.   CARD: Normal rate and regular rhythm, no murmurs and no gallops appreciated.  RESP: Clear to auscultation bilaterally, No wheezes, rales or rhonchi. Good respiratory effort.  ABD: Nondistended, Soft and nontender to palpation in all quadrants, no guarding, no rigidity.   EXT: mild nonpitting ankle edema bilaterally, nontender non erythematous     LABS:                      RADIOLOGY & ADDITIONAL TESTS:    Imaging Personally Reviewed:    Consultant(s) Notes Reviewed:      Care Discussed with Consultants/Other Providers:

## 2023-02-25 NOTE — PROGRESS NOTE ADULT - PROBLEM SELECTOR PLAN 2
Reported to be on Synthroid 125 mcg q.d., likely also non-adherent, with TSH of 410. Initially presenting with some lethargy. Completed steroid taper 1/15 due to concern for adrenal insufficiency.  - Levothyroxine 125 mcg  - Repeat TSH 6.49 and fT4 1.6 on 2/13, about 1 month from after restarting synthroid. Can follow up outpatient, or if still here would recheck in 4 weeks (3/13)

## 2023-02-25 NOTE — PROGRESS NOTE ADULT - ATTENDING COMMENTS
52F previously domiciled with recently  mother PMH bipolar disorder, hypothyroidism BIBEMS for abnormal behavior reported by cousin. Admitted for acute encephalopathy in the setting of psychosis due to lithium nonadherence with course c/b acute renal failure and severe hypothyroidism (TSH 400s on admission). Course c/b +COVID but saturating well on RA.    Reports no ankle pain. Swelling improved. Denies chest pain, sob.    #Bipolar disorder w/ psychosis - C/w cogentin and risperidone per psych recs  #Hypothyroidism - c/w synthroid 125mcg per Endo recs. Repeat TSH and Free T4 -->6.49 and 1.6 on . C/w current dose and repeat in 4-6 weeks  #COVID-19 - positive since 2/3. DC'ed on isolation on   #Pedal edema/LE edema: no JVD noted. Vitals stable. Ambulatory. Encourage elevation of LE while sitting. Dimer wnl. Continue to monitor.     Dispo: Pending guardianship. Prolonged DC planning for placement .

## 2023-02-25 NOTE — PROGRESS NOTE ADULT - PROBLEM SELECTOR PLAN 1
She has bipolar disorder previously on lithium but unclear adherence prior to admission as serum levels low.  - Risperidone 1 mg bid (7 am & 8 pm). Benztropine 0.5 bid for EPS  - Haldol 2 mg IV/IM/p.o. q6h prn for agitation/aggression (last required x1 on )  - QTc 430s after increasing risperidone dose.  - weekly EK/22 QTc < 400  - Psychiatry following, recs appreciated

## 2023-02-26 PROCEDURE — 99232 SBSQ HOSP IP/OBS MODERATE 35: CPT | Mod: GC

## 2023-02-26 RX ADMIN — RISPERIDONE 1 MILLIGRAM(S): 4 TABLET ORAL at 05:37

## 2023-02-26 RX ADMIN — LIDOCAINE 1 PATCH: 4 CREAM TOPICAL at 12:19

## 2023-02-26 RX ADMIN — RISPERIDONE 1 MILLIGRAM(S): 4 TABLET ORAL at 21:11

## 2023-02-26 RX ADMIN — Medication 0.5 MILLIGRAM(S): at 17:21

## 2023-02-26 RX ADMIN — LIDOCAINE 1 PATCH: 4 CREAM TOPICAL at 20:04

## 2023-02-26 RX ADMIN — Medication 0.5 MILLIGRAM(S): at 05:37

## 2023-02-26 RX ADMIN — POLYETHYLENE GLYCOL 3350 17 GRAM(S): 17 POWDER, FOR SOLUTION ORAL at 12:19

## 2023-02-26 RX ADMIN — LIDOCAINE 1 PATCH: 4 CREAM TOPICAL at 23:54

## 2023-02-26 RX ADMIN — Medication 125 MICROGRAM(S): at 05:37

## 2023-02-26 NOTE — PROGRESS NOTE ADULT - SUBJECTIVE AND OBJECTIVE BOX
Sultan Kevin MD  PGY 1 Department of Internal Medicine        Patient is a 53y old  Female who presents with a chief complaint of behavioral change; abnormal TFT (25 Feb 2023 05:31)      SUBJECTIVE / OVERNIGHT EVENTS: Pt seen and examined. No acute overnight events. Denies fevers, chills, CP, SOB, Abdominal pain, N/V, Constipation, Diarrhea        MEDICATIONS  (STANDING):  benztropine 0.5 milliGRAM(s) Oral two times a day  levothyroxine 125 MICROGram(s) Oral daily  lidocaine   4% Patch 1 Patch Transdermal daily  polyethylene glycol 3350 17 Gram(s) Oral daily  risperiDONE   Tablet 1 milliGRAM(s) Oral <User Schedule>  risperiDONE   Tablet 1 milliGRAM(s) Oral <User Schedule>  senna 2 Tablet(s) Oral at bedtime    MEDICATIONS  (PRN):  acetaminophen     Tablet .. 650 milliGRAM(s) Oral every 6 hours PRN Temp greater or equal to 38C (100.4F), Mild Pain (1 - 3)  fluticasone propionate 50 MICROgram(s)/spray Nasal Spray 1 Spray(s) Both Nostrils two times a day PRN Congestion  guaiFENesin Oral Liquid (Sugar-Free) 100 milliGRAM(s) Oral every 6 hours PRN Cough  haloperidol    Injectable 2 milliGRAM(s) IV Push every 6 hours PRN Agitation      I&O's Summary      Vital Signs Last 24 Hrs  T(C): 36.5 (25 Feb 2023 21:59), Max: 36.5 (25 Feb 2023 21:59)  T(F): 97.7 (25 Feb 2023 21:59), Max: 97.7 (25 Feb 2023 21:59)  HR: 60 (25 Feb 2023 21:59) (60 - 83)  BP: 112/70 (25 Feb 2023 21:59) (112/70 - 128/73)  BP(mean): --  RR: 18 (25 Feb 2023 21:59) (18 - 18)  SpO2: 100% (25 Feb 2023 21:59) (99% - 100%)    Parameters below as of 25 Feb 2023 21:59  Patient On (Oxygen Delivery Method): room air        CAPILLARY BLOOD GLUCOSE          PHYSICAL EXAM:  GENERAL: NAD, sleeping comfortably  NEURO: Alert and Oriented to person, place, date and situation. Pupils symmetric, No ptosis. No facial asymmetry or dysarthria, no tremor noted.  HEENT: No conjunctival injection or scleral icterus.   CARD: Normal rate and regular rhythm, no murmurs and no gallops appreciated.  RESP: Clear to auscultation bilaterally, No wheezes, rales or rhonchi. Good respiratory effort.  ABD: Nondistended, Soft and nontender to palpation in all quadrants, no guarding, no rigidity.   EXT: mild nonpitting ankle edema bilaterally, nontender non erythematous     LABS:                      RADIOLOGY & ADDITIONAL TESTS:    Imaging Personally Reviewed:    Consultant(s) Notes Reviewed:      Care Discussed with Consultants/Other Providers:   Sultan Kevin MD  PGY 1 Department of Internal Medicine        Patient is a 53y old  Female who presents with a chief complaint of behavioral change; abnormal TFT (25 Feb 2023 05:31)      SUBJECTIVE / OVERNIGHT EVENTS: Pt seen and examined. No acute overnight events. Denies fevers, chills, CP, SOB, Abdominal pain, N/V, Constipation, Diarrhea. Feels well overall. noting she is having regular BMs        MEDICATIONS  (STANDING):  benztropine 0.5 milliGRAM(s) Oral two times a day  levothyroxine 125 MICROGram(s) Oral daily  lidocaine   4% Patch 1 Patch Transdermal daily  polyethylene glycol 3350 17 Gram(s) Oral daily  risperiDONE   Tablet 1 milliGRAM(s) Oral <User Schedule>  risperiDONE   Tablet 1 milliGRAM(s) Oral <User Schedule>  senna 2 Tablet(s) Oral at bedtime    MEDICATIONS  (PRN):  acetaminophen     Tablet .. 650 milliGRAM(s) Oral every 6 hours PRN Temp greater or equal to 38C (100.4F), Mild Pain (1 - 3)  fluticasone propionate 50 MICROgram(s)/spray Nasal Spray 1 Spray(s) Both Nostrils two times a day PRN Congestion  guaiFENesin Oral Liquid (Sugar-Free) 100 milliGRAM(s) Oral every 6 hours PRN Cough  haloperidol    Injectable 2 milliGRAM(s) IV Push every 6 hours PRN Agitation      I&O's Summary      Vital Signs Last 24 Hrs  T(C): 36.5 (25 Feb 2023 21:59), Max: 36.5 (25 Feb 2023 21:59)  T(F): 97.7 (25 Feb 2023 21:59), Max: 97.7 (25 Feb 2023 21:59)  HR: 60 (25 Feb 2023 21:59) (60 - 83)  BP: 112/70 (25 Feb 2023 21:59) (112/70 - 128/73)  BP(mean): --  RR: 18 (25 Feb 2023 21:59) (18 - 18)  SpO2: 100% (25 Feb 2023 21:59) (99% - 100%)    Parameters below as of 25 Feb 2023 21:59  Patient On (Oxygen Delivery Method): room air        CAPILLARY BLOOD GLUCOSE          PHYSICAL EXAM:  GENERAL: NAD, sleeping comfortably  NEURO: Alert and Oriented to person, place, date and situation. Pupils symmetric, No ptosis. No facial asymmetry or dysarthria, no tremor noted.  HEENT: No conjunctival injection or scleral icterus.   CARD: Normal rate and regular rhythm, no murmurs and no gallops appreciated.  RESP: Clear to auscultation bilaterally, No wheezes, rales or rhonchi. Good respiratory effort.  ABD: Nondistended, Soft and nontender to palpation in all quadrants, no guarding, no rigidity.   EXT: mild nonpitting ankle edema bilaterally, nontender non erythematous     LABS:                      RADIOLOGY & ADDITIONAL TESTS:    Imaging Personally Reviewed:    Consultant(s) Notes Reviewed:      Care Discussed with Consultants/Other Providers:

## 2023-02-27 PROCEDURE — 99232 SBSQ HOSP IP/OBS MODERATE 35: CPT

## 2023-02-27 RX ADMIN — LIDOCAINE 1 PATCH: 4 CREAM TOPICAL at 12:16

## 2023-02-27 RX ADMIN — Medication 0.5 MILLIGRAM(S): at 06:14

## 2023-02-27 RX ADMIN — Medication 125 MICROGRAM(S): at 06:14

## 2023-02-27 RX ADMIN — RISPERIDONE 1 MILLIGRAM(S): 4 TABLET ORAL at 20:43

## 2023-02-27 RX ADMIN — Medication 0.5 MILLIGRAM(S): at 17:42

## 2023-02-27 RX ADMIN — RISPERIDONE 1 MILLIGRAM(S): 4 TABLET ORAL at 06:13

## 2023-02-27 RX ADMIN — LIDOCAINE 1 PATCH: 4 CREAM TOPICAL at 20:45

## 2023-02-27 RX ADMIN — SENNA PLUS 2 TABLET(S): 8.6 TABLET ORAL at 20:44

## 2023-02-27 NOTE — PROGRESS NOTE ADULT - SUBJECTIVE AND OBJECTIVE BOX
Sultan Kevin MD  PGY 1 Department of Internal Medicine        Patient is a 53y old  Female who presents with a chief complaint of behavioral change; abnormal TFT (26 Feb 2023 05:10)      SUBJECTIVE / OVERNIGHT EVENTS: Pt seen and examined. No acute overnight events. Denies fevers, chills, CP, SOB, Abdominal pain, N/V, Constipation, Diarrhea        MEDICATIONS  (STANDING):  benztropine 0.5 milliGRAM(s) Oral two times a day  levothyroxine 125 MICROGram(s) Oral daily  lidocaine   4% Patch 1 Patch Transdermal daily  polyethylene glycol 3350 17 Gram(s) Oral daily  risperiDONE   Tablet 1 milliGRAM(s) Oral <User Schedule>  risperiDONE   Tablet 1 milliGRAM(s) Oral <User Schedule>  senna 2 Tablet(s) Oral at bedtime    MEDICATIONS  (PRN):  acetaminophen     Tablet .. 650 milliGRAM(s) Oral every 6 hours PRN Temp greater or equal to 38C (100.4F), Mild Pain (1 - 3)  fluticasone propionate 50 MICROgram(s)/spray Nasal Spray 1 Spray(s) Both Nostrils two times a day PRN Congestion  guaiFENesin Oral Liquid (Sugar-Free) 100 milliGRAM(s) Oral every 6 hours PRN Cough  haloperidol    Injectable 2 milliGRAM(s) IV Push every 6 hours PRN Agitation      I&O's Summary      Vital Signs Last 24 Hrs  T(C): 36.4 (26 Feb 2023 22:36), Max: 36.6 (26 Feb 2023 13:09)  T(F): 97.5 (26 Feb 2023 22:36), Max: 97.8 (26 Feb 2023 13:09)  HR: 70 (26 Feb 2023 22:36) (70 - 73)  BP: 105/56 (26 Feb 2023 22:36) (105/56 - 119/83)  BP(mean): --  RR: 18 (26 Feb 2023 22:36) (17 - 18)  SpO2: 100% (26 Feb 2023 22:36) (97% - 100%)    Parameters below as of 26 Feb 2023 22:36  Patient On (Oxygen Delivery Method): room air        CAPILLARY BLOOD GLUCOSE          PHYSICAL EXAM:  GENERAL: NAD, sleeping comfortably  NEURO: Alert and Oriented to person, place, date and situation. Pupils symmetric, No ptosis. No facial asymmetry or dysarthria, no tremor noted.  HEENT: No conjunctival injection or scleral icterus.   CARD: Normal rate and regular rhythm, no murmurs and no gallops appreciated.  RESP: Clear to auscultation bilaterally, No wheezes, rales or rhonchi. Good respiratory effort.  ABD: Nondistended, Soft and nontender to palpation in all quadrants, no guarding, no rigidity.   EXT: mild nonpitting ankle edema bilaterally, nontender non erythematous        LABS:                      RADIOLOGY & ADDITIONAL TESTS:    Imaging Personally Reviewed:    Consultant(s) Notes Reviewed:      Care Discussed with Consultants/Other Providers:   Sultan Kevin MD  PGY 1 Department of Internal Medicine        Patient is a 53y old  Female who presents with a chief complaint of behavioral change; abnormal TFT (26 Feb 2023 05:10)      SUBJECTIVE / OVERNIGHT EVENTS: Pt seen and examined. No acute overnight events. Denies fevers, chills, CP, SOB, Abdominal pain, N/V, Constipation, Diarrhea. Feels well overall, has been having BMs, and is ambulating regularly.        MEDICATIONS  (STANDING):  benztropine 0.5 milliGRAM(s) Oral two times a day  levothyroxine 125 MICROGram(s) Oral daily  lidocaine   4% Patch 1 Patch Transdermal daily  polyethylene glycol 3350 17 Gram(s) Oral daily  risperiDONE   Tablet 1 milliGRAM(s) Oral <User Schedule>  risperiDONE   Tablet 1 milliGRAM(s) Oral <User Schedule>  senna 2 Tablet(s) Oral at bedtime    MEDICATIONS  (PRN):  acetaminophen     Tablet .. 650 milliGRAM(s) Oral every 6 hours PRN Temp greater or equal to 38C (100.4F), Mild Pain (1 - 3)  fluticasone propionate 50 MICROgram(s)/spray Nasal Spray 1 Spray(s) Both Nostrils two times a day PRN Congestion  guaiFENesin Oral Liquid (Sugar-Free) 100 milliGRAM(s) Oral every 6 hours PRN Cough  haloperidol    Injectable 2 milliGRAM(s) IV Push every 6 hours PRN Agitation      I&O's Summary      Vital Signs Last 24 Hrs  T(C): 36.4 (26 Feb 2023 22:36), Max: 36.6 (26 Feb 2023 13:09)  T(F): 97.5 (26 Feb 2023 22:36), Max: 97.8 (26 Feb 2023 13:09)  HR: 70 (26 Feb 2023 22:36) (70 - 73)  BP: 105/56 (26 Feb 2023 22:36) (105/56 - 119/83)  BP(mean): --  RR: 18 (26 Feb 2023 22:36) (17 - 18)  SpO2: 100% (26 Feb 2023 22:36) (97% - 100%)    Parameters below as of 26 Feb 2023 22:36  Patient On (Oxygen Delivery Method): room air        CAPILLARY BLOOD GLUCOSE          PHYSICAL EXAM:  GENERAL: NAD, in good spirits, ambulating at time of [place]  NEURO: Alert and Oriented to person, place, time. Pupils symmetric, No ptosis. No facial asymmetry or dysarthria, no tremor noted.  HEENT: No conjunctival injection or scleral icterus.   CARD: Normal rate and regular rhythm, no murmurs and no gallops appreciated.  RESP: Clear to auscultation bilaterally, No wheezes, rales or rhonchi. Good respiratory effort.  ABD: Nondistended, Soft and nontender to palpation in all quadrants, no guarding, no rigidity.   EXT: mild nonpitting ankle edema bilaterally, nontender non erythematous        LABS:                      RADIOLOGY & ADDITIONAL TESTS:    Imaging Personally Reviewed:    Consultant(s) Notes Reviewed:      Care Discussed with Consultants/Other Providers:   No Yes...

## 2023-02-27 NOTE — PROGRESS NOTE ADULT - ATTENDING COMMENTS
52F previously domiciled with recently  mother PMH bipolar disorder, hypothyroidism BIBEMS for abnormal behavior reported by cousin. Admitted for acute encephalopathy in the setting of psychosis due to lithium nonadherence with course c/b acute renal failure (no baseline cr)  and severe hypothyroidism (TSH 400s on admission). Course further c/b +COVID but saturating well on RA.    #Bipolar disorder w/ psychosis - C/w cogentin and risperidone per psych recs  #Hypothyroidism - c/w synthroid 125mcg per Endo recs. Repeat TSH and Free T4 -->6.49 and 1.6 on . C/w current dose and repeat in 4-6 weeks  #COVID-19 - positive since 2/3. DC'ed on isolation on   #Pedal edema/LE edema: no JVD noted. Vitals stable. Ambulatory. Encourage elevation of LE while sitting. Dimer wnl. Continue to monitor.     Dispo: Pending guardianship. Prolonged DC planning for placement .

## 2023-02-28 LAB
ALBUMIN SERPL ELPH-MCNC: 4 G/DL — SIGNIFICANT CHANGE UP (ref 3.3–5)
ALP SERPL-CCNC: 122 U/L — HIGH (ref 40–120)
ALT FLD-CCNC: 12 U/L — SIGNIFICANT CHANGE UP (ref 4–33)
ANION GAP SERPL CALC-SCNC: 10 MMOL/L — SIGNIFICANT CHANGE UP (ref 7–14)
AST SERPL-CCNC: 14 U/L — SIGNIFICANT CHANGE UP (ref 4–32)
BASOPHILS # BLD AUTO: 0.02 K/UL — SIGNIFICANT CHANGE UP (ref 0–0.2)
BASOPHILS NFR BLD AUTO: 0.3 % — SIGNIFICANT CHANGE UP (ref 0–2)
BILIRUB SERPL-MCNC: 0.2 MG/DL — SIGNIFICANT CHANGE UP (ref 0.2–1.2)
BUN SERPL-MCNC: 25 MG/DL — HIGH (ref 7–23)
CALCIUM SERPL-MCNC: 9.6 MG/DL — SIGNIFICANT CHANGE UP (ref 8.4–10.5)
CHLORIDE SERPL-SCNC: 109 MMOL/L — HIGH (ref 98–107)
CO2 SERPL-SCNC: 22 MMOL/L — SIGNIFICANT CHANGE UP (ref 22–31)
CREAT SERPL-MCNC: 1.39 MG/DL — HIGH (ref 0.5–1.3)
EGFR: 45 ML/MIN/1.73M2 — LOW
EOSINOPHIL # BLD AUTO: 0.22 K/UL — SIGNIFICANT CHANGE UP (ref 0–0.5)
EOSINOPHIL NFR BLD AUTO: 3.1 % — SIGNIFICANT CHANGE UP (ref 0–6)
GLUCOSE SERPL-MCNC: 89 MG/DL — SIGNIFICANT CHANGE UP (ref 70–99)
HCT VFR BLD CALC: 35.3 % — SIGNIFICANT CHANGE UP (ref 34.5–45)
HGB BLD-MCNC: 10.9 G/DL — LOW (ref 11.5–15.5)
IANC: 5.29 K/UL — SIGNIFICANT CHANGE UP (ref 1.8–7.4)
IMM GRANULOCYTES NFR BLD AUTO: 0.1 % — SIGNIFICANT CHANGE UP (ref 0–0.9)
LYMPHOCYTES # BLD AUTO: 1.08 K/UL — SIGNIFICANT CHANGE UP (ref 1–3.3)
LYMPHOCYTES # BLD AUTO: 15.2 % — SIGNIFICANT CHANGE UP (ref 13–44)
MAGNESIUM SERPL-MCNC: 2 MG/DL — SIGNIFICANT CHANGE UP (ref 1.6–2.6)
MCHC RBC-ENTMCNC: 26.7 PG — LOW (ref 27–34)
MCHC RBC-ENTMCNC: 30.9 GM/DL — LOW (ref 32–36)
MCV RBC AUTO: 86.5 FL — SIGNIFICANT CHANGE UP (ref 80–100)
MONOCYTES # BLD AUTO: 0.47 K/UL — SIGNIFICANT CHANGE UP (ref 0–0.9)
MONOCYTES NFR BLD AUTO: 6.6 % — SIGNIFICANT CHANGE UP (ref 2–14)
NEUTROPHILS # BLD AUTO: 5.29 K/UL — SIGNIFICANT CHANGE UP (ref 1.8–7.4)
NEUTROPHILS NFR BLD AUTO: 74.7 % — SIGNIFICANT CHANGE UP (ref 43–77)
NRBC # BLD: 0 /100 WBCS — SIGNIFICANT CHANGE UP (ref 0–0)
NRBC # FLD: 0 K/UL — SIGNIFICANT CHANGE UP (ref 0–0)
PHOSPHATE SERPL-MCNC: 3.8 MG/DL — SIGNIFICANT CHANGE UP (ref 2.5–4.5)
PLATELET # BLD AUTO: 219 K/UL — SIGNIFICANT CHANGE UP (ref 150–400)
POTASSIUM SERPL-MCNC: 4.2 MMOL/L — SIGNIFICANT CHANGE UP (ref 3.5–5.3)
POTASSIUM SERPL-SCNC: 4.2 MMOL/L — SIGNIFICANT CHANGE UP (ref 3.5–5.3)
PROT SERPL-MCNC: 7.4 G/DL — SIGNIFICANT CHANGE UP (ref 6–8.3)
RBC # BLD: 4.08 M/UL — SIGNIFICANT CHANGE UP (ref 3.8–5.2)
RBC # FLD: 13.1 % — SIGNIFICANT CHANGE UP (ref 10.3–14.5)
SODIUM SERPL-SCNC: 141 MMOL/L — SIGNIFICANT CHANGE UP (ref 135–145)
WBC # BLD: 7.09 K/UL — SIGNIFICANT CHANGE UP (ref 3.8–10.5)
WBC # FLD AUTO: 7.09 K/UL — SIGNIFICANT CHANGE UP (ref 3.8–10.5)

## 2023-02-28 PROCEDURE — 99232 SBSQ HOSP IP/OBS MODERATE 35: CPT | Mod: GC

## 2023-02-28 RX ADMIN — Medication 125 MICROGRAM(S): at 06:45

## 2023-02-28 RX ADMIN — LIDOCAINE 1 PATCH: 4 CREAM TOPICAL at 04:14

## 2023-02-28 RX ADMIN — RISPERIDONE 1 MILLIGRAM(S): 4 TABLET ORAL at 06:45

## 2023-02-28 RX ADMIN — LIDOCAINE 1 PATCH: 4 CREAM TOPICAL at 23:19

## 2023-02-28 RX ADMIN — Medication 0.5 MILLIGRAM(S): at 06:45

## 2023-02-28 RX ADMIN — LIDOCAINE 1 PATCH: 4 CREAM TOPICAL at 11:00

## 2023-02-28 NOTE — PROGRESS NOTE ADULT - SUBJECTIVE AND OBJECTIVE BOX
Sultan Kevin MD  PGY 1 Department of Internal Medicine        Patient is a 53y old  Female who presents with a chief complaint of behavioral change; abnormal TFT (27 Feb 2023 05:28)      SUBJECTIVE / OVERNIGHT EVENTS: Pt seen and examined. No acute overnight events. Denies fevers, chills, CP, SOB, Abdominal pain, N/V, Constipation, Diarrhea        MEDICATIONS  (STANDING):  benztropine 0.5 milliGRAM(s) Oral two times a day  levothyroxine 125 MICROGram(s) Oral daily  lidocaine   4% Patch 1 Patch Transdermal daily  polyethylene glycol 3350 17 Gram(s) Oral daily  risperiDONE   Tablet 1 milliGRAM(s) Oral <User Schedule>  risperiDONE   Tablet 1 milliGRAM(s) Oral <User Schedule>  senna 2 Tablet(s) Oral at bedtime    MEDICATIONS  (PRN):  acetaminophen     Tablet .. 650 milliGRAM(s) Oral every 6 hours PRN Temp greater or equal to 38C (100.4F), Mild Pain (1 - 3)  fluticasone propionate 50 MICROgram(s)/spray Nasal Spray 1 Spray(s) Both Nostrils two times a day PRN Congestion  guaiFENesin Oral Liquid (Sugar-Free) 100 milliGRAM(s) Oral every 6 hours PRN Cough      I&O's Summary      Vital Signs Last 24 Hrs  T(C): 36.4 (27 Feb 2023 21:58), Max: 36.8 (27 Feb 2023 13:11)  T(F): 97.6 (27 Feb 2023 21:58), Max: 98.3 (27 Feb 2023 13:11)  HR: 65 (27 Feb 2023 21:58) (65 - 69)  BP: 119/74 (27 Feb 2023 21:58) (119/74 - 137/86)  BP(mean): --  RR: 17 (27 Feb 2023 21:58) (17 - 17)  SpO2: 100% (27 Feb 2023 21:58) (100% - 100%)    Parameters below as of 27 Feb 2023 21:58  Patient On (Oxygen Delivery Method): room air        CAPILLARY BLOOD GLUCOSE          PHYSICAL EXAM:  GENERAL: NAD, in good spirits, ambulating at time of encoutner  NEURO: Alert and Oriented to person, place, time. Pupils symmetric, No ptosis. No facial asymmetry or dysarthria, no tremor noted.  HEENT: No conjunctival injection or scleral icterus.   CARD: Normal rate and regular rhythm, no murmurs and no gallops appreciated.  RESP: Clear to auscultation bilaterally, No wheezes, rales or rhonchi. Good respiratory effort.  ABD: Nondistended, Soft and nontender to palpation in all quadrants, no guarding, no rigidity.   EXT: mild nonpitting ankle edema bilaterally, nontender non erythematous        LABS:                      RADIOLOGY & ADDITIONAL TESTS:    Imaging Personally Reviewed:    Consultant(s) Notes Reviewed:      Care Discussed with Consultants/Other Providers:   Sultan Kevin MD  PGY 1 Department of Internal Medicine        Patient is a 53y old  Female who presents with a chief complaint of behavioral change; abnormal TFT (27 Feb 2023 05:28)      SUBJECTIVE / OVERNIGHT EVENTS: Pt seen and examined. No acute overnight events. Denies fevers, chills, CP, SOB, Abdominal pain, N/V, Constipation, Diarrhea. Feels well. Having BMs.        MEDICATIONS  (STANDING):  benztropine 0.5 milliGRAM(s) Oral two times a day  levothyroxine 125 MICROGram(s) Oral daily  lidocaine   4% Patch 1 Patch Transdermal daily  polyethylene glycol 3350 17 Gram(s) Oral daily  risperiDONE   Tablet 1 milliGRAM(s) Oral <User Schedule>  risperiDONE   Tablet 1 milliGRAM(s) Oral <User Schedule>  senna 2 Tablet(s) Oral at bedtime    MEDICATIONS  (PRN):  acetaminophen     Tablet .. 650 milliGRAM(s) Oral every 6 hours PRN Temp greater or equal to 38C (100.4F), Mild Pain (1 - 3)  fluticasone propionate 50 MICROgram(s)/spray Nasal Spray 1 Spray(s) Both Nostrils two times a day PRN Congestion  guaiFENesin Oral Liquid (Sugar-Free) 100 milliGRAM(s) Oral every 6 hours PRN Cough      I&O's Summary      Vital Signs Last 24 Hrs  T(C): 36.4 (27 Feb 2023 21:58), Max: 36.8 (27 Feb 2023 13:11)  T(F): 97.6 (27 Feb 2023 21:58), Max: 98.3 (27 Feb 2023 13:11)  HR: 65 (27 Feb 2023 21:58) (65 - 69)  BP: 119/74 (27 Feb 2023 21:58) (119/74 - 137/86)  BP(mean): --  RR: 17 (27 Feb 2023 21:58) (17 - 17)  SpO2: 100% (27 Feb 2023 21:58) (100% - 100%)    Parameters below as of 27 Feb 2023 21:58  Patient On (Oxygen Delivery Method): room air        CAPILLARY BLOOD GLUCOSE          PHYSICAL EXAM:  GENERAL: NAD, in good spirits, ambulating at time of encoutner  NEURO: Alert and Oriented to person, place, time. Pupils symmetric, No ptosis. No facial asymmetry or dysarthria, no tremor noted.  HEENT: No conjunctival injection or scleral icterus.   CARD: Normal rate and regular rhythm, no murmurs and no gallops appreciated.  RESP: Clear to auscultation bilaterally, No wheezes, rales or rhonchi. Good respiratory effort.  ABD: Nondistended, Soft and nontender to palpation in all quadrants, no guarding, no rigidity.   EXT: mild nonpitting ankle edema bilaterally, nontender non erythematous        LABS:                      RADIOLOGY & ADDITIONAL TESTS:    Imaging Personally Reviewed:    Consultant(s) Notes Reviewed:      Care Discussed with Consultants/Other Providers:

## 2023-02-28 NOTE — PROGRESS NOTE ADULT - PROBLEM SELECTOR PLAN 1
She has bipolar disorder previously on lithium but unclear adherence prior to admission as serum levels low.  - Risperidone 1 mg bid (7 am & 8 pm). Benztropine 0.5 bid for EPS  - Haldol 2 mg IV/IM/p.o. q6h prn for agitation/aggression (last required x1 on )  - QTc 430s after increasing risperidone dose.  - weekly EK/22 QTc < 400  - Psychiatry following, recs appreciated She has bipolar disorder previously on lithium but unclear adherence prior to admission as serum levels low.  - Risperidone 1 mg bid (7 am & 8 pm). Benztropine 0.5 bid for EPS  - Haldol 2 mg IV/IM/p.o. q6h prn for agitation/aggression (last required x1 on ) -> dc  - QTc 430s after increasing risperidone dose.  - weekly EK/22 QTc < 400  - Psychiatry following, recs appreciated

## 2023-02-28 NOTE — PROGRESS NOTE ADULT - ATTENDING COMMENTS
52F previously domiciled with recently  mother PMH bipolar disorder, hypothyroidism BIBEMS for abnormal behavior reported by cousin. Admitted for acute encephalopathy in the setting of psychosis due to lithium nonadherence with course c/b acute renal failure (no baseline cr)  and severe hypothyroidism (TSH 400s on admission). Course further c/b +COVID but saturating well on RA and currently asymptomatic. S/p isolation    #Bipolar disorder w/ psychosis - C/w cogentin and risperidone per psych recs  #Hypothyroidism - c/w synthroid 125mcg per Endo recs. Repeat TSH and Free T4 -->6.49 and 1.6 on . C/w current dose and repeat in 4-6 weeks  #COVID-19 - positive since 2/3. DC'ed on isolation on   #Pedal edema/LE edema: no JVD noted. Vitals stable. Ambulatory. Encourage elevation of LE while sitting. D-Dimer wnl. Continue to monitor.     Dispo: Pending guardianship. Prolonged DC planning for placement .

## 2023-03-01 PROCEDURE — 99231 SBSQ HOSP IP/OBS SF/LOW 25: CPT

## 2023-03-01 PROCEDURE — 99232 SBSQ HOSP IP/OBS MODERATE 35: CPT | Mod: GC

## 2023-03-01 RX ADMIN — RISPERIDONE 1 MILLIGRAM(S): 4 TABLET ORAL at 21:00

## 2023-03-01 RX ADMIN — RISPERIDONE 1 MILLIGRAM(S): 4 TABLET ORAL at 06:57

## 2023-03-01 RX ADMIN — LIDOCAINE 1 PATCH: 4 CREAM TOPICAL at 21:35

## 2023-03-01 RX ADMIN — Medication 0.5 MILLIGRAM(S): at 05:35

## 2023-03-01 RX ADMIN — Medication 0.5 MILLIGRAM(S): at 17:30

## 2023-03-01 RX ADMIN — SENNA PLUS 2 TABLET(S): 8.6 TABLET ORAL at 21:00

## 2023-03-01 RX ADMIN — LIDOCAINE 1 PATCH: 4 CREAM TOPICAL at 11:36

## 2023-03-01 RX ADMIN — Medication 125 MICROGRAM(S): at 05:35

## 2023-03-01 NOTE — BH CONSULTATION LIAISON PROGRESS NOTE - NSBHPSYCHOLCOGORIENT_PSY_A_CORE
Oriented to time, place, person, situation
Not fully oriented...
Oriented to time, place, person, situation

## 2023-03-01 NOTE — BH CONSULTATION LIAISON PROGRESS NOTE - CURRENT MEDICATION
MEDICATIONS  (STANDING):  heparin   Injectable 5000 Unit(s) SubCutaneous every 8 hours  levothyroxine 125 MICROGram(s) Oral daily  risperiDONE   Tablet 1 milliGRAM(s) Oral daily    MEDICATIONS  (PRN):  acetaminophen     Tablet .. 650 milliGRAM(s) Oral every 6 hours PRN Temp greater or equal to 38C (100.4F), Mild Pain (1 - 3)  haloperidol    Injectable 2 milliGRAM(s) IV Push every 6 hours PRN Agitation  LORazepam   Injectable 2 milliGRAM(s) IntraMuscular every 6 hours PRN Agitation  
MEDICATIONS  (STANDING):  benztropine 0.5 milliGRAM(s) Oral two times a day  heparin   Injectable 5000 Unit(s) SubCutaneous every 8 hours  levothyroxine 125 MICROGram(s) Oral daily  risperiDONE   Tablet 1 milliGRAM(s) Oral <User Schedule>  risperiDONE   Tablet 1 milliGRAM(s) Oral <User Schedule>    MEDICATIONS  (PRN):  acetaminophen     Tablet .. 650 milliGRAM(s) Oral every 6 hours PRN Temp greater or equal to 38C (100.4F), Mild Pain (1 - 3)  guaiFENesin Oral Liquid (Sugar-Free) 100 milliGRAM(s) Oral every 6 hours PRN Cough  haloperidol    Injectable 2 milliGRAM(s) IV Push every 6 hours PRN Agitation  
MEDICATIONS  (STANDING):  dextrose 5%. 1000 milliLiter(s) (50 mL/Hr) IV Continuous <Continuous>  dextrose 5%. 1000 milliLiter(s) (100 mL/Hr) IV Continuous <Continuous>  dextrose 5%. 1000 milliLiter(s) (100 mL/Hr) IV Continuous <Continuous>  dextrose 50% Injectable 25 Gram(s) IV Push once  dextrose 50% Injectable 12.5 Gram(s) IV Push once  dextrose 50% Injectable 25 Gram(s) IV Push once  glucagon  Injectable 1 milliGRAM(s) IntraMuscular once  heparin   Injectable 5000 Unit(s) SubCutaneous every 8 hours  insulin lispro (ADMELOG) corrective regimen sliding scale   SubCutaneous three times a day before meals  insulin lispro (ADMELOG) corrective regimen sliding scale   SubCutaneous at bedtime  levothyroxine 125 MICROGram(s) Oral daily  risperiDONE   Tablet 1 milliGRAM(s) Oral daily    MEDICATIONS  (PRN):  acetaminophen     Tablet .. 650 milliGRAM(s) Oral every 6 hours PRN Temp greater or equal to 38C (100.4F), Mild Pain (1 - 3)  dextrose Oral Gel 15 Gram(s) Oral once PRN Blood Glucose LESS THAN 70 milliGRAM(s)/deciliter  haloperidol    Injectable 2 milliGRAM(s) IV Push every 6 hours PRN Agitation  LORazepam   Injectable 2 milliGRAM(s) IntraMuscular every 6 hours PRN Agitation  
MEDICATIONS  (STANDING):    MEDICATIONS  (PRN):  acetaminophen     Tablet .. 650 milliGRAM(s) Oral every 6 hours PRN Temp greater or equal to 38C (100.4F), Mild Pain (1 - 3)  haloperidol    Injectable 5 milliGRAM(s) IntraMuscular every 6 hours PRN Agitation  LORazepam   Injectable 2 milliGRAM(s) IntraMuscular every 6 hours PRN Agitation  
MEDICATIONS  (STANDING):  dextrose 5%. 1000 milliLiter(s) (50 mL/Hr) IV Continuous <Continuous>  dextrose 5%. 1000 milliLiter(s) (100 mL/Hr) IV Continuous <Continuous>  dextrose 50% Injectable 25 Gram(s) IV Push once  dextrose 50% Injectable 12.5 Gram(s) IV Push once  dextrose 50% Injectable 25 Gram(s) IV Push once  glucagon  Injectable 1 milliGRAM(s) IntraMuscular once  insulin lispro (ADMELOG) corrective regimen sliding scale   SubCutaneous three times a day before meals  insulin lispro (ADMELOG) corrective regimen sliding scale   SubCutaneous at bedtime  levothyroxine Injectable 100 MICROGram(s) IV Push <User Schedule>    MEDICATIONS  (PRN):  acetaminophen     Tablet .. 650 milliGRAM(s) Oral every 6 hours PRN Temp greater or equal to 38C (100.4F), Mild Pain (1 - 3)  dextrose Oral Gel 15 Gram(s) Oral once PRN Blood Glucose LESS THAN 70 milliGRAM(s)/deciliter  haloperidol    Injectable 2 milliGRAM(s) IV Push every 6 hours PRN Agitation  LORazepam   Injectable 2 milliGRAM(s) IntraMuscular every 6 hours PRN Agitation  
MEDICATIONS  (STANDING):  heparin   Injectable 5000 Unit(s) SubCutaneous every 8 hours  levothyroxine 125 MICROGram(s) Oral daily  risperiDONE   Tablet 0.5 milliGRAM(s) Oral <User Schedule>  risperiDONE   Tablet 1 milliGRAM(s) Oral <User Schedule>    MEDICATIONS  (PRN):  acetaminophen     Tablet .. 650 milliGRAM(s) Oral every 6 hours PRN Temp greater or equal to 38C (100.4F), Mild Pain (1 - 3)  haloperidol    Injectable 2 milliGRAM(s) IV Push every 6 hours PRN Agitation  
MEDICATIONS  (STANDING):  dextrose 5%. 1000 milliLiter(s) (100 mL/Hr) IV Continuous <Continuous>  dextrose 5%. 1000 milliLiter(s) (50 mL/Hr) IV Continuous <Continuous>  dextrose 5%. 1000 milliLiter(s) (100 mL/Hr) IV Continuous <Continuous>  dextrose 50% Injectable 25 Gram(s) IV Push once  dextrose 50% Injectable 12.5 Gram(s) IV Push once  dextrose 50% Injectable 25 Gram(s) IV Push once  glucagon  Injectable 1 milliGRAM(s) IntraMuscular once  heparin   Injectable 5000 Unit(s) SubCutaneous every 8 hours  insulin lispro (ADMELOG) corrective regimen sliding scale   SubCutaneous three times a day before meals  insulin lispro (ADMELOG) corrective regimen sliding scale   SubCutaneous at bedtime  levothyroxine 125 MICROGram(s) Oral daily  risperiDONE   Tablet 0.5 milliGRAM(s) Oral <User Schedule>    MEDICATIONS  (PRN):  acetaminophen     Tablet .. 650 milliGRAM(s) Oral every 6 hours PRN Temp greater or equal to 38C (100.4F), Mild Pain (1 - 3)  dextrose Oral Gel 15 Gram(s) Oral once PRN Blood Glucose LESS THAN 70 milliGRAM(s)/deciliter  haloperidol    Injectable 2 milliGRAM(s) IV Push every 6 hours PRN Agitation  LORazepam   Injectable 2 milliGRAM(s) IntraMuscular every 6 hours PRN Agitation  
MEDICATIONS  (STANDING):  benztropine 0.5 milliGRAM(s) Oral two times a day  heparin   Injectable 5000 Unit(s) SubCutaneous every 8 hours  levothyroxine 125 MICROGram(s) Oral daily  risperiDONE   Tablet 1 milliGRAM(s) Oral <User Schedule>  risperiDONE   Tablet 1 milliGRAM(s) Oral <User Schedule>    MEDICATIONS  (PRN):  acetaminophen     Tablet .. 650 milliGRAM(s) Oral every 6 hours PRN Temp greater or equal to 38C (100.4F), Mild Pain (1 - 3)  guaiFENesin Oral Liquid (Sugar-Free) 100 milliGRAM(s) Oral every 6 hours PRN Cough  haloperidol    Injectable 2 milliGRAM(s) IV Push every 6 hours PRN Agitation  
MEDICATIONS  (STANDING):  benztropine 0.5 milliGRAM(s) Oral two times a day  heparin   Injectable 5000 Unit(s) SubCutaneous every 8 hours  levothyroxine 125 MICROGram(s) Oral daily  risperiDONE   Tablet 1 milliGRAM(s) Oral <User Schedule>  risperiDONE   Tablet 1 milliGRAM(s) Oral <User Schedule>    MEDICATIONS  (PRN):  acetaminophen     Tablet .. 650 milliGRAM(s) Oral every 6 hours PRN Temp greater or equal to 38C (100.4F), Mild Pain (1 - 3)  guaiFENesin Oral Liquid (Sugar-Free) 100 milliGRAM(s) Oral every 6 hours PRN Cough  haloperidol    Injectable 2 milliGRAM(s) IV Push every 6 hours PRN Agitation  
MEDICATIONS  (STANDING):  heparin   Injectable 5000 Unit(s) SubCutaneous every 8 hours  levothyroxine 125 MICROGram(s) Oral daily  risperiDONE   Tablet 1 milliGRAM(s) Oral <User Schedule>  risperiDONE   Tablet 1 milliGRAM(s) Oral <User Schedule>    MEDICATIONS  (PRN):  acetaminophen     Tablet .. 650 milliGRAM(s) Oral every 6 hours PRN Temp greater or equal to 38C (100.4F), Mild Pain (1 - 3)  haloperidol    Injectable 2 milliGRAM(s) IV Push every 6 hours PRN Agitation  
MEDICATIONS  (STANDING):  benztropine 0.5 milliGRAM(s) Oral two times a day  levothyroxine 125 MICROGram(s) Oral daily  lidocaine   4% Patch 1 Patch Transdermal daily  polyethylene glycol 3350 17 Gram(s) Oral daily  risperiDONE   Tablet 1 milliGRAM(s) Oral <User Schedule>  risperiDONE   Tablet 1 milliGRAM(s) Oral <User Schedule>  senna 2 Tablet(s) Oral at bedtime    MEDICATIONS  (PRN):  acetaminophen     Tablet .. 650 milliGRAM(s) Oral every 6 hours PRN Temp greater or equal to 38C (100.4F), Mild Pain (1 - 3)  fluticasone propionate 50 MICROgram(s)/spray Nasal Spray 1 Spray(s) Both Nostrils two times a day PRN Congestion  guaiFENesin Oral Liquid (Sugar-Free) 100 milliGRAM(s) Oral every 6 hours PRN Cough  
MEDICATIONS  (STANDING):  heparin   Injectable 5000 Unit(s) SubCutaneous every 8 hours  levothyroxine 125 MICROGram(s) Oral daily  risperiDONE   Tablet 0.5 milliGRAM(s) Oral <User Schedule>  risperiDONE   Tablet 1 milliGRAM(s) Oral <User Schedule>    MEDICATIONS  (PRN):  acetaminophen     Tablet .. 650 milliGRAM(s) Oral every 6 hours PRN Temp greater or equal to 38C (100.4F), Mild Pain (1 - 3)  haloperidol    Injectable 2 milliGRAM(s) IV Push every 6 hours PRN Agitation  
MEDICATIONS  (STANDING):  benztropine 0.5 milliGRAM(s) Oral two times a day  heparin   Injectable 5000 Unit(s) SubCutaneous every 8 hours  levothyroxine 125 MICROGram(s) Oral daily  risperiDONE   Tablet 1 milliGRAM(s) Oral <User Schedule>  risperiDONE   Tablet 1 milliGRAM(s) Oral <User Schedule>    MEDICATIONS  (PRN):  acetaminophen     Tablet .. 650 milliGRAM(s) Oral every 6 hours PRN Temp greater or equal to 38C (100.4F), Mild Pain (1 - 3)  guaiFENesin Oral Liquid (Sugar-Free) 100 milliGRAM(s) Oral every 6 hours PRN Cough  haloperidol    Injectable 2 milliGRAM(s) IV Push every 6 hours PRN Agitation

## 2023-03-01 NOTE — BH CONSULTATION LIAISON PROGRESS NOTE - NSBHMSEAFFQUAL_PSY_A_CORE
Irritable
Euthymic
reserved/Euthymic
Euthymic

## 2023-03-01 NOTE — BH CONSULTATION LIAISON PROGRESS NOTE - NSBHCHARTREVIEWLAB_PSY_A_CORE FT
CBC Full  -  ( 17 Jan 2023 06:00 )  WBC Count : 6.41 K/uL  RBC Count : 4.31 M/uL  Hemoglobin : 11.7 g/dL  Hematocrit : 39.4 %  Platelet Count - Automated : 296 K/uL  Mean Cell Volume : 91.4 fL  Mean Cell Hemoglobin : 27.1 pg  Mean Cell Hemoglobin Concentration : 29.7 gm/dL  Auto Neutrophil # : x  Auto Lymphocyte # : x  Auto Monocyte # : x  Auto Eosinophil # : x  Auto Basophil # : x  Auto Neutrophil % : x  Auto Lymphocyte % : x  Auto Monocyte % : x  Auto Eosinophil % : x  Auto Basophil % : x  01-17    149<H>  |  114<H>  |  17  ----------------------------<  83  4.2   |  26  |  1.61<H>    Ca    9.7      17 Jan 2023 06:00  Phos  3.4     01-17  Mg     2.10     01-17    TPro  6.6  /  Alb  3.9  /  TBili  <0.2  /  DBili  x   /  AST  27  /  ALT  22  /  AlkPhos  101  01-16  
                      10.9   7.09  )-----------( 219      ( 28 Feb 2023 06:29 )             35.3   02-28    141  |  109<H>  |  25<H>  ----------------------------<  89  4.2   |  22  |  1.39<H>    Ca    9.6      28 Feb 2023 06:29  Phos  3.8     02-28  Mg     2.00     02-28    TPro  7.4  /  Alb  4.0  /  TBili  0.2  /  DBili  x   /  AST  14  /  ALT  12  /  AlkPhos  122<H>  02-28  
CBC Full  -  ( 13 Jan 2023 06:00 )  WBC Count : 10.04 K/uL  RBC Count : 4.01 M/uL  Hemoglobin : 11.1 g/dL  Hematocrit : 35.4 %  Platelet Count - Automated : 291 K/uL  Mean Cell Volume : 88.3 fL  Mean Cell Hemoglobin : 27.7 pg  Mean Cell Hemoglobin Concentration : 31.4 gm/dL  Auto Neutrophil # : 7.63 K/uL  Auto Lymphocyte # : 1.79 K/uL  Auto Monocyte # : 0.55 K/uL  Auto Eosinophil # : 0.02 K/uL  Auto Basophil # : 0.03 K/uL  Auto Neutrophil % : 76.0 %  Auto Lymphocyte % : 17.8 %  Auto Monocyte % : 5.5 %  Auto Eosinophil % : 0.2 %  Auto Basophil % : 0.3 %  01-13    143  |  110<H>  |  8   ----------------------------<  94  3.2<L>   |  21<L>  |  1.48<H>    Ca    9.5      13 Jan 2023 06:00  Phos  2.7     01-13  Mg     2.00     01-13    TPro  7.2  /  Alb  4.1  /  TBili  <0.2  /  DBili  x   /  AST  25  /  ALT  14  /  AlkPhos  123<H>  01-12  
CBC Full  -  ( 30 Jan 2023 06:24 )  WBC Count : 6.14 K/uL  RBC Count : 4.09 M/uL  Hemoglobin : 11.1 g/dL  Hematocrit : 36.5 %  Platelet Count - Automated : 244 K/uL  Mean Cell Volume : 89.2 fL  Mean Cell Hemoglobin : 27.1 pg  Mean Cell Hemoglobin Concentration : 30.4 gm/dL  Auto Neutrophil # : x  Auto Lymphocyte # : x  Auto Monocyte # : x  Auto Eosinophil # : x  Auto Basophil # : x  Auto Neutrophil % : x  Auto Lymphocyte % : x  Auto Monocyte % : x  Auto Eosinophil % : x  Auto Basophil % : x  01-30    143  |  111<H>  |  27<H>  ----------------------------<  91  4.2   |  23  |  1.39<H>    Ca    9.5      30 Jan 2023 06:24  Phos  4.1     01-30  Mg     2.10     01-30    
                      11.9   7.43  )-----------( 283      ( 12 Jan 2023 05:30 )             37.1   01-12    146<H>  |  113<H>  |  10  ----------------------------<  98  3.6   |  21<L>  |  1.41<H>    Ca    9.8      12 Jan 2023 05:30    TPro  7.2  /  Alb  4.1  /  TBili  <0.2  /  DBili  x   /  AST  25  /  ALT  14  /  AlkPhos  123<H>  01-12  
CBC Full  -  ( 18 Jan 2023 06:30 )  WBC Count : 8.72 K/uL  RBC Count : 4.00 M/uL  Hemoglobin : 11.0 g/dL  Hematocrit : 36.0 %  Platelet Count - Automated : 266 K/uL  Mean Cell Volume : 90.0 fL  Mean Cell Hemoglobin : 27.5 pg  Mean Cell Hemoglobin Concentration : 30.6 gm/dL  Auto Neutrophil # : x  Auto Lymphocyte # : x  Auto Monocyte # : x  Auto Eosinophil # : x  Auto Basophil # : x  Auto Neutrophil % : x  Auto Lymphocyte % : x  Auto Monocyte % : x  Auto Eosinophil % : x  Auto Basophil % : x  01-18    147<H>  |  112<H>  |  15  ----------------------------<  95  3.3<L>   |  23  |  1.46<H>    Ca    8.3<L>      18 Jan 2023 06:30  Phos  4.1     01-18  Mg     1.90     01-18    
CBC Full  -  ( 03 Feb 2023 05:55 )  WBC Count : 4.83 K/uL  RBC Count : 3.78 M/uL  Hemoglobin : 10.5 g/dL  Hematocrit : 34.0 %  Platelet Count - Automated : 180 K/uL  Mean Cell Volume : 89.9 fL  Mean Cell Hemoglobin : 27.8 pg  Mean Cell Hemoglobin Concentration : 30.9 gm/dL  Auto Neutrophil # : 3.02 K/uL  Auto Lymphocyte # : 0.88 K/uL  Auto Monocyte # : 0.82 K/uL  Auto Eosinophil # : 0.07 K/uL  Auto Basophil # : 0.02 K/uL  Auto Neutrophil % : 62.6 %  Auto Lymphocyte % : 18.2 %  Auto Monocyte % : 17.0 %  Auto Eosinophil % : 1.4 %  Auto Basophil % : 0.4 %  02-03    140  |  107  |  21  ----------------------------<  83  4.2   |  22  |  1.52<H>    Ca    9.2      03 Feb 2023 05:55  Phos  3.6     02-03  Mg     1.80     02-03    TPro  6.7  /  Alb  3.7  /  TBili  <0.2  /  DBili  x   /  AST  20  /  ALT  16  /  AlkPhos  98  02-03

## 2023-03-01 NOTE — BH CONSULTATION LIAISON PROGRESS NOTE - NSBHPTASSESSDT_PSY_A_CORE
30-Jan-2023 14:18
08-Feb-2023 12:05
02-Feb-2023 12:57
07-Feb-2023 12:33
03-Feb-2023 13:18
25-Jan-2023 13:33
18-Jan-2023 13:26
06-Feb-2023 11:58
01-Mar-2023 12:41
17-Jan-2023 13:39
27-Jan-2023 13:53
13-Jan-2023 14:45
12-Jan-2023 13:19

## 2023-03-01 NOTE — BH CONSULTATION LIAISON PROGRESS NOTE - NSBHFUPINTERVALCCFT_PSY_A_CORE
"im okay"  no PRNs  no irritability  COVID+
intermittent impulsivity. Compliant with meds. 
Patient states, "I'm doing ok."  
received a prn haldol last evening for agitation. Calm since then. Reviewed endocrinology records--on IV levothyroxine  discussed below plan with medicine resident Dr Tim
"im good, I do need a ginger ale"   
No behavioral issues, no prn needs, calm, compliant, no safety issues reported by medicine team in discussions  Vitals stable  Reviewed labs from 1/30  Case discussed with medicine team-- below plan aware
Patient states, "I'm doing ok."  
noted that patient received haldol prn last night-- agitated.   compliant with care, meds  care coordinated with medicine team-- below plan aware
no behavioral issues, no agitation, no prn needs, compliant, sleep and appetite fair.
No prn needs. Calm, compliant. Appetite and sleep fair  
no agitation, no prn needs, compliant, calm  Care coordinated with medicine team-- below plan discussed  Reviewed SW notes--- plan is for placement at this time. 
has been calm, no prn needs, no behavioral issues, compliant with meds as per MAR.   Care coordinated with medicine team-- dr walton  Also coordinated with admin- Dr Cook-- d/c planning discussion was done  Reviewed chart, labs
"im here because I wasn't sleeping well"

## 2023-03-01 NOTE — BH CONSULTATION LIAISON PROGRESS NOTE - NSICDXBHPRIMARYDX_PSY_ALL_CORE
Bipolar disorder   F31.9  

## 2023-03-01 NOTE — PROGRESS NOTE ADULT - PROBLEM SELECTOR PLAN 3
- No BM since last week according to patient  - Senna started 2/19, miralax 2/20. Since she had a BM, continue daily miralax being monitored for Nait/Vaginal Bleeding

## 2023-03-01 NOTE — BH CONSULTATION LIAISON PROGRESS NOTE - NSBHATTESTTYPEVISIT_PSY_A_CORE
Attending Only
Attending Only
JACQUELIN without on-site Attending supervision
Attending Only
JACQUELIN without on-site Attending supervision
JACQUELIN without on-site Attending supervision
Attending Only
Attending evaluating patient with JACQUELIN ([id number] code)

## 2023-03-01 NOTE — BH CONSULTATION LIAISON PROGRESS NOTE - NSBHFUPREASONCONS_PSY_A_CORE
psychosis/med management
med management
psychosis/med management

## 2023-03-01 NOTE — PROGRESS NOTE ADULT - SUBJECTIVE AND OBJECTIVE BOX
Sultan Kevin MD  PGY 1 Department of Internal Medicine        Patient is a 53y old  Female who presents with a chief complaint of behavioral change; abnormal TFT (28 Feb 2023 05:23)      SUBJECTIVE / OVERNIGHT EVENTS: Pt seen and examined. No acute overnight events. Denies fevers, chills, CP, SOB, Abdominal pain, N/V, Constipation, Diarrhea        MEDICATIONS  (STANDING):  benztropine 0.5 milliGRAM(s) Oral two times a day  levothyroxine 125 MICROGram(s) Oral daily  lidocaine   4% Patch 1 Patch Transdermal daily  polyethylene glycol 3350 17 Gram(s) Oral daily  risperiDONE   Tablet 1 milliGRAM(s) Oral <User Schedule>  risperiDONE   Tablet 1 milliGRAM(s) Oral <User Schedule>  senna 2 Tablet(s) Oral at bedtime    MEDICATIONS  (PRN):  acetaminophen     Tablet .. 650 milliGRAM(s) Oral every 6 hours PRN Temp greater or equal to 38C (100.4F), Mild Pain (1 - 3)  fluticasone propionate 50 MICROgram(s)/spray Nasal Spray 1 Spray(s) Both Nostrils two times a day PRN Congestion  guaiFENesin Oral Liquid (Sugar-Free) 100 milliGRAM(s) Oral every 6 hours PRN Cough      I&O's Summary      Vital Signs Last 24 Hrs  T(C): 36.9 (28 Feb 2023 21:39), Max: 36.9 (28 Feb 2023 21:39)  T(F): 98.5 (28 Feb 2023 21:39), Max: 98.5 (28 Feb 2023 21:39)  HR: 72 (28 Feb 2023 21:39) (72 - 78)  BP: 115/80 (28 Feb 2023 21:39) (115/80 - 121/77)  BP(mean): --  RR: 18 (28 Feb 2023 21:39) (18 - 18)  SpO2: 100% (28 Feb 2023 21:39) (100% - 100%)    Parameters below as of 28 Feb 2023 21:39  Patient On (Oxygen Delivery Method): room air        CAPILLARY BLOOD GLUCOSE          PHYSICAL EXAM:  GENERAL: NAD, in good spirits, ambulating at time of encoutner  NEURO: Alert and Oriented to person, place, time. Pupils symmetric, No ptosis. No facial asymmetry or dysarthria, no tremor noted.  HEENT: No conjunctival injection or scleral icterus.   CARD: Normal rate and regular rhythm, no murmurs and no gallops appreciated.  RESP: Clear to auscultation bilaterally, No wheezes, rales or rhonchi. Good respiratory effort.  ABD: Nondistended, Soft and nontender to palpation in all quadrants, no guarding, no rigidity.   EXT: mild nonpitting ankle edema bilaterally, nontender non erythematou     LABS:                        10.9   7.09  )-----------( 219      ( 28 Feb 2023 06:29 )             35.3     Auto Eosinophil # 0.22  / Auto Eosinophil % 3.1   / Auto Neutrophil # 5.29  / Auto Neutrophil % 74.7  / BANDS % x        02-28    141  |  109<H>  |  25<H>  ----------------------------<  89  4.2   |  22  |  1.39<H>    Ca    9.6      28 Feb 2023 06:29  Mg     2.00     02-28  Phos  3.8     02-28  TPro  7.4  /  Alb  4.0  /  TBili  0.2  /  DBili  x   /  AST  14  /  ALT  12  /  AlkPhos  122<H>  02-28                  RADIOLOGY & ADDITIONAL TESTS:    Imaging Personally Reviewed:    Consultant(s) Notes Reviewed:      Care Discussed with Consultants/Other Providers:   Sultan Kevin MD  PGY 1 Department of Internal Medicine        Patient is a 53y old  Female who presents with a chief complaint of behavioral change; abnormal TFT (28 Feb 2023 05:23)      SUBJECTIVE / OVERNIGHT EVENTS: Pt seen and examined. No acute overnight events. Denies fevers, chills, CP, SOB, Abdominal pain, N/V, Constipation, Diarrhea. Feels well overall.         MEDICATIONS  (STANDING):  benztropine 0.5 milliGRAM(s) Oral two times a day  levothyroxine 125 MICROGram(s) Oral daily  lidocaine   4% Patch 1 Patch Transdermal daily  polyethylene glycol 3350 17 Gram(s) Oral daily  risperiDONE   Tablet 1 milliGRAM(s) Oral <User Schedule>  risperiDONE   Tablet 1 milliGRAM(s) Oral <User Schedule>  senna 2 Tablet(s) Oral at bedtime    MEDICATIONS  (PRN):  acetaminophen     Tablet .. 650 milliGRAM(s) Oral every 6 hours PRN Temp greater or equal to 38C (100.4F), Mild Pain (1 - 3)  fluticasone propionate 50 MICROgram(s)/spray Nasal Spray 1 Spray(s) Both Nostrils two times a day PRN Congestion  guaiFENesin Oral Liquid (Sugar-Free) 100 milliGRAM(s) Oral every 6 hours PRN Cough      I&O's Summary      Vital Signs Last 24 Hrs  T(C): 36.9 (28 Feb 2023 21:39), Max: 36.9 (28 Feb 2023 21:39)  T(F): 98.5 (28 Feb 2023 21:39), Max: 98.5 (28 Feb 2023 21:39)  HR: 72 (28 Feb 2023 21:39) (72 - 78)  BP: 115/80 (28 Feb 2023 21:39) (115/80 - 121/77)  BP(mean): --  RR: 18 (28 Feb 2023 21:39) (18 - 18)  SpO2: 100% (28 Feb 2023 21:39) (100% - 100%)    Parameters below as of 28 Feb 2023 21:39  Patient On (Oxygen Delivery Method): room air        CAPILLARY BLOOD GLUCOSE          PHYSICAL EXAM:  GENERAL: NAD, in good spirits, ambulating at time of encoutner  NEURO: Alert and Oriented to person, place, time. Pupils symmetric, No ptosis. No facial asymmetry or dysarthria, no tremor noted.  HEENT: No conjunctival injection or scleral icterus.   CARD: Normal rate and regular rhythm, no murmurs and no gallops appreciated.  RESP: Clear to auscultation bilaterally, No wheezes, rales or rhonchi. Good respiratory effort.  ABD: Nondistended, Soft and nontender to palpation in all quadrants, no guarding, no rigidity.   EXT: mild nonpitting ankle edema bilaterally, nontender non erythematou     LABS:                        10.9   7.09  )-----------( 219      ( 28 Feb 2023 06:29 )             35.3     Auto Eosinophil # 0.22  / Auto Eosinophil % 3.1   / Auto Neutrophil # 5.29  / Auto Neutrophil % 74.7  / BANDS % x        02-28    141  |  109<H>  |  25<H>  ----------------------------<  89  4.2   |  22  |  1.39<H>    Ca    9.6      28 Feb 2023 06:29  Mg     2.00     02-28  Phos  3.8     02-28  TPro  7.4  /  Alb  4.0  /  TBili  0.2  /  DBili  x   /  AST  14  /  ALT  12  /  AlkPhos  122<H>  02-28                  RADIOLOGY & ADDITIONAL TESTS:    Imaging Personally Reviewed:    Consultant(s) Notes Reviewed:      Care Discussed with Consultants/Other Providers:

## 2023-03-01 NOTE — BH CONSULTATION LIAISON PROGRESS NOTE - NSBHFUPINTERVALHXFT_PSY_A_CORE
Met with patient. Calm, superficial in interactions, but engages appropriately in conversation. A bit more spontaneity. She denies any mood symptoms, denies any si or hi, and denies any ah or vh or delusions when asked.   Noted to have a pin rolling tremor on right hand. No cogwheeling no rigidity  Oriented to time, month, and location. Still not aware of mother's death.     Care coordinated with cousin Armaan. States that he has been visiting her and finds her doing well. States that this is the baseline that he knows for years. No evidence of any acute psychiatric symptoms when he visits. She is more spontaneous her conversations, and asks for her needs such as 'phone, comb' etc which he is planning on going to her house and getting. He is working with the medicine team in getting a facility for her. States that she has always had her mother to take care of her, and will struggle to take care of self by herself-- compliance with meds, adl's, etc. 
Chart reviewed. No prn's given, patient medication compliant. Patient seen, a&o, calm, cooperative, reports mood as "ok," denies sleep/appetite disturbance, denies auditory/visual hallucinations. Patient states she enjoys watching television.    Writer spoke with patient's cousin, Armaan today regarding clothes as discussed with patient, Armaan states he planned on visiting later this afternoon and bringing patient clothes.   
Met with patient. Remains superficial in her interactions. Denies any mood symptoms, denies any si or hi, no evident psychosis. Calm, comfortable, nonchalant. Still no insight into her parents having . Oriented to month, year.   Attempted to call cousin Armaan at number in chart---- no answer. 
Met with the patient. Oriented x 3. Interestingly affect much more reactive today, smiles, and more spontaneous in her conversation. She as previously denies any mood symptoms, denies any si or hi and denies any ah or vh or delusions when asked. Was watching a Jainism mass on the TV.   Engaged patient is much more of a discussion today---she states that she lives at home with her parents. She did not believe when this MD informed her that her father had passed away years ago and that her mother had  recently. She states- ' she only fell'. She states that parents and her reply on cousin Armaan for groceries any pressing needs. She knows that she does not drive, and states that she can walk to [facility] for immediate grocery needs.   Explored her insight into medical issues, and denies any knowledge of having hypothyroidism, or need to be on Levothyroxine. States' I did not have any thyroid problems'. When asked about mental health issues, she states vaguely- ' I don't know. Maybe. Bipolar?'. Knows that she was on Lithium in the past.     Care coordinated extensively with cousidney Crook. He reiterates that patient's mother had been secretive about patient's mental health issues. Had graduated HS, but after that did not go to college. Since after HS, patient has been living home, and parents assist with her needs, and she needed coaxing to take medications, attend to adl's. Generally quiet, can 'talk to self', but not aggressive or agitated. She would walk to [facility] and buy what she needs using a debit card.   Armaan arranged meals on wheels for patient+ mother. Armaan is concerned that patient who has heavily relied on parents for simple tasks will not be able to manage on her own. He himself is unable to continue to do what he used to do like groceries for her, or like mother ensure her compliance with medications.   Armaan states that he visits her frequently at the hospital, and she is more engaged in conversation, and 'much better'.   Armaan is in the process of applying for medicaid, and in the process had contacted PCP office, who provided information that patient carried a diagnosis of Bipolar disorder, was on cogentin , lithium, and zyprexa (15mg).   Below plan discussed at length with roney. 
Patient was seen and assessed at bedside for follow up. Patient now COVID +.  She remains without need for PRN, behavior is in control.   Patient compliant with medications, reports no side effects.  Tremor noted to be less significant on exam. No rigidity.   Patient states her mood is good, she enjoys watching game shows on TV.  Denies AH and VH, feels safe here in [facility]. no SI or HI.  LImited insight. 
Met with the patient. Vague, guarded in her interaction. Appears suspicious rodrick when I ask repeated questions. Cna become quickly radha and irritable. Does not become aggressive or agitated. Superficially denies any mood symptoms, denies any si or hi, or ah or vh or paranoia. Denies having any psychiatric history. Still believes that she believes with her 'dad and mom' and they are at home right now.   
Met with the patient. Complains of a headache, and states that she has asked for an 'aspirin'. Easily irritable, and dismissive today. Vague. Remains with poor insight. Superficially denies any ah or delusions. Knew the year, month.   Tolerating Risperidone. 
Chart reviewed. No PRNs given. Patient does not understand that she has COVID. She also continues to deny any medical problems/history or psychiatric history. Repeats multiple times that she feels like she has a cold. Denies SI/HI. Denies AH/VH. Knows she is at [facility] but does not believe it is a hospital. Knows the date.  Otherwise, interview is limited.  
Met with the patient. Remains vague in her interaction. Denies any mood symptoms, denies any si or hi, denies any vh or delusions when asked. Can be noted to be talking to self, but denies any ah. Oriented x 3 when asked.   Still believes that ' my parents are living with Armaan'.   Examined patient--- no cogwheeling or rigidity. 
Met with the patient. Remains guarded, vague, superficial. But no distress, no agitation. Denies any mood symptoms, denies any si or hi, and denies any ah or vh or paranoia when asked. Better oriented today. Still no insight into mental health issues, or the death of her parents.     Coordinated with cousin Armaan. He states that his aunt was rather secretive about patient's mental health history. Did everything for patient apparently. No history of inpatient psych admissions. Just kept to herself. No aggression. Discussed below plan with cousin. He is in agreement. 
Met with the patient. When asked about the episode of agitation last night, she states- ' I did not want that sleeping medication'. Does not elaborate. She continues to deny any mood symptoms, denies any si or hi, and denies any ah or vh or delusions when asked. It is suspected that patient at baseline has psychosis.
Patient was seen and assessed at bedside for f/u/.  Patient is calm, cooperative, limited knowledge of reason for admission and ongoing treatment plan.  No PRNs have been required  Patient states she is eating and sleeping well  no c/o of AH or VH. Denies SI and HI  
Patient seen by  ED in the am hours, patient now admitted to medicine.  Patient is calm, cooperative. Aware she is in the hospital, states she is here for poor sleep - unable to relay her sleep pattern to provider.  Patient with illogical answers to direct questions at times, some disorganization. 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 Patient reports she lives with her mother and father ( both  now), and states provider can call them for information. Reports her parents get medication for her and she has not seen a psychiatrist since . Aware she is on lithium, compliance uncertain. Patient denies SI and HI.

## 2023-03-01 NOTE — BH CONSULTATION LIAISON PROGRESS NOTE - NSBHATTESTBILLING_PSY_A_CORE
03027-Xmhspzznjf OBS or IP - low complexity OR 25-34 mins
66201-Zhevnhamkx OBS or IP - low complexity OR 25-34 mins
74163-Zirzqffawv OBS or IP - low complexity OR 25-34 mins
83381-Qirfrvucxa OBS or IP - low complexity OR 25-34 mins
81476-Hmsbjbgfmc OBS or IP - low complexity OR 25-34 mins
27179-Raclwmkwtb OBS or IP - high complexity OR 50-79 mins
Billing in another system
54903-Hwkfqebuzi OBS or IP - low complexity OR 25-34 mins
86378-Hjfonhpfrl OBS or IP - low complexity OR 25-34 mins
57924-Dyysihkliu OBS or IP - moderate complexity OR 35-49 mins
Billing in another system
Billing in another system
Non-billable

## 2023-03-01 NOTE — BH CONSULTATION LIAISON PROGRESS NOTE - NSBHMSESPEECH_PSY_A_CORE
Normal volume, rate, productivity, spontaneity and articulation
Abnormal as indicated, otherwise normal...
Normal volume, rate, productivity, spontaneity and articulation

## 2023-03-01 NOTE — PROGRESS NOTE ADULT - ATTENDING COMMENTS
52F previously domiciled with recently  mother PMH bipolar disorder, hypothyroidism BIBEMS for abnormal behavior reported by cousin. Admitted for acute encephalopathy in the setting of psychosis due to lithium nonadherence with course c/b acute renal failure (no baseline cr)  and severe hypothyroidism (TSH 400s on admission). Course further c/b +COVID but saturating well on RA and currently asymptomatic. S/p isolation    Patient has been stable and calm    C/w cogentin and risperidone. Psych following and recs appreciated     Dispo: Pending guardianship. Prolonged DC planning for placement .

## 2023-03-01 NOTE — BH CONSULTATION LIAISON PROGRESS NOTE - NSBHMSEAFFRANGE_PSY_A_CORE
Blunted
Constricted
Blunted
Constricted
Full
Constricted
Blunted
Constricted
Constricted

## 2023-03-01 NOTE — BH CONSULTATION LIAISON PROGRESS NOTE - NSBHMSETHTCONTENT_PSY_A_CORE
Unremarkable
Other
Other
Unremarkable
Other
Unremarkable/Other
Other
Other
Unremarkable/Other

## 2023-03-01 NOTE — BH CONSULTATION LIAISON PROGRESS NOTE - NSICDXBHTERTIARYDX_PSY_ALL_CORE
R/O Schizophrenia   F20.9  

## 2023-03-01 NOTE — BH CONSULTATION LIAISON PROGRESS NOTE - NSBHCHARTREVIEWVS_PSY_A_CORE FT
Vital Signs Last 24 Hrs  T(C): 36.8 (17 Jan 2023 09:42), Max: 37.2 (16 Jan 2023 16:44)  T(F): 98.3 (17 Jan 2023 09:42), Max: 99 (16 Jan 2023 16:44)  HR: 87 (17 Jan 2023 09:42) (79 - 87)  BP: 414/89 (17 Jan 2023 09:42) (132/87 - 414/89)  BP(mean): --  RR: 20 (17 Jan 2023 09:42) (18 - 20)  SpO2: 100% (17 Jan 2023 09:42) (100% - 100%)    Parameters below as of 17 Jan 2023 09:42  Patient On (Oxygen Delivery Method): room air    
Vital Signs Last 24 Hrs  T(C): 36.4 (18 Jan 2023 09:00), Max: 36.8 (17 Jan 2023 17:52)  T(F): 97.5 (18 Jan 2023 09:00), Max: 98.2 (17 Jan 2023 17:52)  HR: 80 (18 Jan 2023 09:00) (80 - 87)  BP: 135/80 (18 Jan 2023 09:00) (135/80 - 137/100)  BP(mean): --  RR: 18 (18 Jan 2023 09:00) (17 - 18)  SpO2: 100% (18 Jan 2023 09:00) (100% - 100%)    Parameters below as of 18 Jan 2023 09:00  Patient On (Oxygen Delivery Method): room air    
Vital Signs Last 24 Hrs  T(C): 36.6 (07 Feb 2023 05:30), Max: 36.6 (06 Feb 2023 21:11)  T(F): 97.9 (07 Feb 2023 05:30), Max: 97.9 (07 Feb 2023 05:30)  HR: 88 (07 Feb 2023 05:30) (82 - 88)  BP: 150/77 (07 Feb 2023 05:30) (120/85 - 150/77)  BP(mean): --  RR: 17 (07 Feb 2023 05:30) (17 - 17)  SpO2: 99% (07 Feb 2023 05:30) (99% - 99%)    Parameters below as of 07 Feb 2023 05:30  Patient On (Oxygen Delivery Method): room air    
Vital Signs Last 24 Hrs  T(C): 37.2 (13 Jan 2023 14:11), Max: 37.2 (13 Jan 2023 14:11)  T(F): 98.9 (13 Jan 2023 14:11), Max: 98.9 (13 Jan 2023 14:11)  HR: 87 (13 Jan 2023 14:11) (75 - 87)  BP: 165/89 (13 Jan 2023 14:11) (150/98 - 165/89)  BP(mean): --  RR: 18 (13 Jan 2023 14:11) (17 - 18)  SpO2: 96% (13 Jan 2023 14:11) (96% - 100%)    Parameters below as of 13 Jan 2023 14:11  Patient On (Oxygen Delivery Method): room air    
Vital Signs Last 24 Hrs  T(C): 36.9 (01 Mar 2023 05:08), Max: 36.9 (28 Feb 2023 21:39)  T(F): 98.5 (01 Mar 2023 05:08), Max: 98.5 (28 Feb 2023 21:39)  HR: 61 (01 Mar 2023 05:08) (61 - 78)  BP: 103/60 (01 Mar 2023 05:08) (103/60 - 121/77)  BP(mean): --  RR: 15 (01 Mar 2023 05:08) (15 - 18)  SpO2: 100% (01 Mar 2023 05:08) (100% - 100%)    Parameters below as of 01 Mar 2023 05:08  Patient On (Oxygen Delivery Method): room air    
Vital Signs Last 24 Hrs  T(C): 36.4 (08 Feb 2023 05:33), Max: 36.5 (07 Feb 2023 13:42)  T(F): 97.5 (08 Feb 2023 05:33), Max: 97.7 (07 Feb 2023 13:42)  HR: 82 (08 Feb 2023 05:33) (80 - 97)  BP: 113/71 (08 Feb 2023 05:33) (113/71 - 153/90)  BP(mean): --  RR: 18 (08 Feb 2023 05:33) (17 - 18)  SpO2: 100% (08 Feb 2023 05:33) (98% - 100%)    Parameters below as of 08 Feb 2023 05:33  Patient On (Oxygen Delivery Method): room air    
Vital Signs Last 24 Hrs  T(C): 36.5 (30 Jan 2023 12:40), Max: 36.9 (29 Jan 2023 21:00)  T(F): 97.7 (30 Jan 2023 12:40), Max: 98.5 (29 Jan 2023 21:00)  HR: 78 (30 Jan 2023 12:40) (78 - 93)  BP: 116/83 (30 Jan 2023 12:40) (116/83 - 140/79)  BP(mean): --  RR: 17 (30 Jan 2023 12:40) (17 - 19)  SpO2: 100% (30 Jan 2023 12:40) (98% - 100%)    Parameters below as of 30 Jan 2023 12:40  Patient On (Oxygen Delivery Method): room air    
Vital Signs Last 24 Hrs  T(C): 36.3 (02 Feb 2023 05:34), Max: 36.9 (01 Feb 2023 21:55)  T(F): 97.3 (02 Feb 2023 05:34), Max: 98.5 (01 Feb 2023 21:55)  HR: 94 (02 Feb 2023 05:34) (94 - 107)  BP: 126/73 (02 Feb 2023 05:34) (126/73 - 138/86)  BP(mean): --  RR: 17 (02 Feb 2023 05:34) (17 - 18)  SpO2: 100% (02 Feb 2023 05:34) (100% - 100%)    Parameters below as of 02 Feb 2023 05:34  Patient On (Oxygen Delivery Method): room air    
Vital Signs Last 24 Hrs  T(C): 36.4 (27 Jan 2023 05:05), Max: 36.4 (26 Jan 2023 21:52)  T(F): 97.5 (27 Jan 2023 05:05), Max: 97.5 (26 Jan 2023 21:52)  HR: 76 (27 Jan 2023 05:05) (76 - 83)  BP: 130/80 (27 Jan 2023 05:05) (126/85 - 130/80)  BP(mean): --  RR: 18 (27 Jan 2023 05:05) (18 - 18)  SpO2: 100% (27 Jan 2023 05:05) (100% - 100%)    Parameters below as of 27 Jan 2023 05:05  Patient On (Oxygen Delivery Method): room air    
Vital Signs Last 24 Hrs  T(C): 36.4 (25 Jan 2023 13:11), Max: 36.8 (25 Jan 2023 05:45)  T(F): 97.6 (25 Jan 2023 13:11), Max: 98.2 (25 Jan 2023 05:45)  HR: 75 (25 Jan 2023 13:11) (75 - 81)  BP: 139/84 (25 Jan 2023 13:11) (138/85 - 142/81)  BP(mean): --  RR: 17 (25 Jan 2023 13:11) (17 - 18)  SpO2: 100% (25 Jan 2023 13:11) (98% - 100%)    Parameters below as of 25 Jan 2023 13:11  Patient On (Oxygen Delivery Method): room air    
Vital Signs Last 24 Hrs  T(C): 36.7 (06 Feb 2023 06:06), Max: 36.7 (06 Feb 2023 06:06)  T(F): 98 (06 Feb 2023 06:06), Max: 98 (06 Feb 2023 06:06)  HR: 86 (06 Feb 2023 06:06) (80 - 86)  BP: 115/72 (06 Feb 2023 06:06) (115/72 - 127/74)  BP(mean): --  RR: 17 (06 Feb 2023 06:06) (17 - 17)  SpO2: 99% (06 Feb 2023 06:06) (99% - 100%)    Parameters below as of 06 Feb 2023 06:06  Patient On (Oxygen Delivery Method): room air    
Vital Signs Last 24 Hrs  T(C): 37 (12 Jan 2023 09:00), Max: 37 (12 Jan 2023 09:00)  T(F): 98.6 (12 Jan 2023 09:00), Max: 98.6 (12 Jan 2023 09:00)  HR: 87 (12 Jan 2023 09:00) (69 - 87)  BP: 144/90 (12 Jan 2023 09:00) (144/90 - 173/92)  BP(mean): --  RR: 17 (12 Jan 2023 09:00) (16 - 18)  SpO2: 100% (12 Jan 2023 09:00) (100% - 100%)    Parameters below as of 12 Jan 2023 09:00  Patient On (Oxygen Delivery Method): room air    
Vital Signs Last 24 Hrs  T(C): 36.5 (03 Feb 2023 12:57), Max: 37.2 (02 Feb 2023 21:17)  T(F): 97.7 (03 Feb 2023 12:57), Max: 98.9 (02 Feb 2023 21:17)  HR: 97 (03 Feb 2023 12:57) (83 - 97)  BP: 128/85 (03 Feb 2023 12:57) (107/65 - 128/85)  BP(mean): --  RR: 18 (03 Feb 2023 12:57) (18 - 18)  SpO2: 97% (03 Feb 2023 12:57) (97% - 99%)    Parameters below as of 03 Feb 2023 12:57  Patient On (Oxygen Delivery Method): room air

## 2023-03-01 NOTE — PROGRESS NOTE ADULT - PROBLEM SELECTOR PLAN 7
RESOLVED  Mild Cough, no hypoxemia. No fevers. Tested positive for COVID on 2/3   Decided against remdesivir i/s/o CKD and mild infection  - continue to monitor for now  - Robitussin prn for cough  - flonase  - isolation precautions D/C RESOLVED  Mild Cough, no hypoxemia. No fevers. Tested positive for COVID on 2/3   Decided against remdesivir i/s/o CKD and mild infection  - continue to monitor for now  - Robitussin prn for cough  - flonase  - isolation precautions now dcd

## 2023-03-01 NOTE — BH CONSULTATION LIAISON PROGRESS NOTE - NSBHCONSULTFOLLOWAFTERCARE_PSY_A_CORE FT
as above
as above
Will benefit from psychiatric f/u at facility
team working on placement
as above
no psychiatric c/i to d/c planning
as above
no psychiatric c/i to continue d/c planning
as above
no psychiatric contraindications to continue d/c planning

## 2023-03-01 NOTE — BH CONSULTATION LIAISON PROGRESS NOTE - NSBHMSEMOVE_PSY_A_CORE
No abnormal movements
frequently digging in purse/No abnormal movements
No abnormal movements
Other
Tremors
No abnormal movements
Tremors
No abnormal movements

## 2023-03-01 NOTE — BH CONSULTATION LIAISON PROGRESS NOTE - NSBHCONSFOLLOWNEEDS_PSY_ALL_CORE
No psychiatric contraindications to discharge
Needs further psych safety assessment prior to discharge
No psychiatric contraindications to discharge
Needs further psych safety assessment prior to discharge
No psychiatric contraindications to discharge
Needs further psych safety assessment prior to discharge
No psychiatric contraindications to discharge

## 2023-03-01 NOTE — BH CONSULTATION LIAISON PROGRESS NOTE - NSBHMSETHTPROC_PSY_A_CORE
Other
Disorganized/Illogical
Other
Linear
Other
Other
Linear/Other
Other
Linear
Perseverative/Other
Other

## 2023-03-01 NOTE — BH CONSULTATION LIAISON PROGRESS NOTE - NSBHMSEPERCEPT_PSY_A_CORE
Other
No abnormalities
Other
No abnormalities
denies AVH but appeared internally preoccupied/No abnormalities
No abnormalities

## 2023-03-01 NOTE — BH CONSULTATION LIAISON PROGRESS NOTE - NSBHMSEGAIT_PSY_A_CORE
Normal gait / station
Normal gait / station
Unable to assess
Normal gait / station

## 2023-03-01 NOTE — PROGRESS NOTE ADULT - PROBLEM SELECTOR PLAN 1
She has bipolar disorder previously on lithium but unclear adherence prior to admission as serum levels low.  - Risperidone 1 mg bid (7 am & 8 pm). Benztropine 0.5 bid for EPS  - Haldol 2 mg IV/IM/p.o. q6h prn for agitation/aggression (last required x1 on ) -> dc  - QTc 430s after increasing risperidone dose.  - weekly EK/22 QTc < 400  - Psychiatry following, recs appreciated

## 2023-03-01 NOTE — BH CONSULTATION LIAISON PROGRESS NOTE - GENERAL APPEARANCE
No deformities present
possible developmental delay/No deformities present
No deformities present

## 2023-03-01 NOTE — BH CONSULTATION LIAISON PROGRESS NOTE - NSBHDSMAXES_PSY_ALL_CORE
See above

## 2023-03-02 PROCEDURE — 99232 SBSQ HOSP IP/OBS MODERATE 35: CPT | Mod: GC

## 2023-03-02 RX ADMIN — RISPERIDONE 1 MILLIGRAM(S): 4 TABLET ORAL at 21:09

## 2023-03-02 RX ADMIN — LIDOCAINE 1 PATCH: 4 CREAM TOPICAL at 12:45

## 2023-03-02 RX ADMIN — LIDOCAINE 1 PATCH: 4 CREAM TOPICAL at 00:27

## 2023-03-02 RX ADMIN — LIDOCAINE 1 PATCH: 4 CREAM TOPICAL at 19:30

## 2023-03-02 RX ADMIN — Medication 0.5 MILLIGRAM(S): at 17:20

## 2023-03-02 RX ADMIN — SENNA PLUS 2 TABLET(S): 8.6 TABLET ORAL at 21:09

## 2023-03-02 RX ADMIN — Medication 125 MICROGRAM(S): at 07:05

## 2023-03-02 RX ADMIN — Medication 0.5 MILLIGRAM(S): at 07:05

## 2023-03-02 RX ADMIN — RISPERIDONE 1 MILLIGRAM(S): 4 TABLET ORAL at 07:05

## 2023-03-02 NOTE — PROGRESS NOTE ADULT - ATTENDING COMMENTS
52F previously domiciled with recently  mother PMH bipolar disorder, hypothyroidism BIBEMS for abnormal behavior reported by cousin. Admitted for acute encephalopathy in the setting of psychosis due to lithium nonadherence with course c/b acute renal failure (no baseline cr)  and severe hypothyroidism (TSH 400s on admission). Course further c/b +COVID but saturating well on RA and currently asymptomatic. S/p isolation    Patient has been stable and calm    C/w cogentin and risperidone.    Dispo: Plan is for Plan is for SNF placement at discharge. Will f/u daily w/ SW/CM in re to progress

## 2023-03-02 NOTE — PROGRESS NOTE ADULT - PROBLEM SELECTOR PLAN 1
She has bipolar disorder previously on lithium but unclear adherence prior to admission as serum levels low.  - Risperidone 1 mg bid (7 am & 8 pm). Benztropine 0.5 bid for EPS  - Haldol 2 mg IV/IM/p.o. q6h prn for agitation/aggression (last required x1 on ) -> dc  - QTc 430s after increasing risperidone dose.  - weekly EK/22 QTc < 400  - Psychiatry following, recs appreciated She has bipolar disorder previously on lithium but unclear adherence prior to admission as serum levels low.  - Risperidone 1 mg bid (7 am & 8 pm). Benztropine 0.5 bid for EPS  - Haldol 2 mg IV/IM/p.o. q6h prn for agitation/aggression (last required x1 on ) -> dc  - QTc 430s after increasing risperidone dose.  - weekly EK/22 QTc < 400  - Psychiatry following, recs appreciated -> on 3/2 noting she is not actively psychotic but still does not have capacity/insight

## 2023-03-02 NOTE — PROGRESS NOTE ADULT - SUBJECTIVE AND OBJECTIVE BOX
Sultan Kevin MD  PGY 1 Department of Internal Medicine        Patient is a 53y old  Female who presents with a chief complaint of behavioral change; abnormal TFT (01 Mar 2023 05:09)      SUBJECTIVE / OVERNIGHT EVENTS: Pt seen and examined. No acute overnight events. Denies fevers, chills, CP, SOB, Abdominal pain, N/V, Constipation, Diarrhea        MEDICATIONS  (STANDING):  benztropine 0.5 milliGRAM(s) Oral two times a day  levothyroxine 125 MICROGram(s) Oral daily  lidocaine   4% Patch 1 Patch Transdermal daily  polyethylene glycol 3350 17 Gram(s) Oral daily  risperiDONE   Tablet 1 milliGRAM(s) Oral <User Schedule>  risperiDONE   Tablet 1 milliGRAM(s) Oral <User Schedule>  senna 2 Tablet(s) Oral at bedtime    MEDICATIONS  (PRN):  acetaminophen     Tablet .. 650 milliGRAM(s) Oral every 6 hours PRN Temp greater or equal to 38C (100.4F), Mild Pain (1 - 3)  fluticasone propionate 50 MICROgram(s)/spray Nasal Spray 1 Spray(s) Both Nostrils two times a day PRN Congestion  guaiFENesin Oral Liquid (Sugar-Free) 100 milliGRAM(s) Oral every 6 hours PRN Cough      I&O's Summary      Vital Signs Last 24 Hrs  T(C): 36.4 (01 Mar 2023 22:16), Max: 36.4 (01 Mar 2023 22:16)  T(F): 97.6 (01 Mar 2023 22:16), Max: 97.6 (01 Mar 2023 22:16)  HR: 67 (01 Mar 2023 22:16) (63 - 67)  BP: 138/83 (01 Mar 2023 22:16) (131/82 - 138/83)  BP(mean): --  RR: 17 (01 Mar 2023 13:05) (17 - 17)  SpO2: 98% (01 Mar 2023 22:16) (98% - 100%)    Parameters below as of 01 Mar 2023 22:16  Patient On (Oxygen Delivery Method): room air        CAPILLARY BLOOD GLUCOSE          PHYSICAL EXAM:  GENERAL: NAD, in good spirits, ambulating at time of encoutner  NEURO: Alert and Oriented to person, place, time. Pupils symmetric, No ptosis. No facial asymmetry or dysarthria, no tremor noted.  HEENT: No conjunctival injection or scleral icterus.   CARD: Normal rate and regular rhythm, no murmurs and no gallops appreciated.  RESP: Clear to auscultation bilaterally, No wheezes, rales or rhonchi. Good respiratory effort.  ABD: Nondistended, Soft and nontender to palpation in all quadrants, no guarding, no rigidity.   EXT: mild nonpitting ankle edema bilaterally, nontender non erythematou      LABS:                        10.9   7.09  )-----------( 219      ( 28 Feb 2023 06:29 )             35.3     Auto Eosinophil # 0.22  / Auto Eosinophil % 3.1   / Auto Neutrophil # 5.29  / Auto Neutrophil % 74.7  / BANDS % x        02-28    141  |  109<H>  |  25<H>  ----------------------------<  89  4.2   |  22  |  1.39<H>    Ca    9.6      28 Feb 2023 06:29  Mg     2.00     02-28  Phos  3.8     02-28  TPro  7.4  /  Alb  4.0  /  TBili  0.2  /  DBili  x   /  AST  14  /  ALT  12  /  AlkPhos  122<H>  02-28                  RADIOLOGY & ADDITIONAL TESTS:    Imaging Personally Reviewed:    Consultant(s) Notes Reviewed:      Care Discussed with Consultants/Other Providers:   Sultan Kevin MD  PGY 1 Department of Internal Medicine        Patient is a 53y old  Female who presents with a chief complaint of behavioral change; abnormal TFT (01 Mar 2023 05:09)      SUBJECTIVE / OVERNIGHT EVENTS: Pt seen and examined. No acute overnight events. Denies fevers, chills, CP, SOB, Abdominal pain, N/V, Constipation, Diarrhea. Feels well overall.        MEDICATIONS  (STANDING):  benztropine 0.5 milliGRAM(s) Oral two times a day  levothyroxine 125 MICROGram(s) Oral daily  lidocaine   4% Patch 1 Patch Transdermal daily  polyethylene glycol 3350 17 Gram(s) Oral daily  risperiDONE   Tablet 1 milliGRAM(s) Oral <User Schedule>  risperiDONE   Tablet 1 milliGRAM(s) Oral <User Schedule>  senna 2 Tablet(s) Oral at bedtime    MEDICATIONS  (PRN):  acetaminophen     Tablet .. 650 milliGRAM(s) Oral every 6 hours PRN Temp greater or equal to 38C (100.4F), Mild Pain (1 - 3)  fluticasone propionate 50 MICROgram(s)/spray Nasal Spray 1 Spray(s) Both Nostrils two times a day PRN Congestion  guaiFENesin Oral Liquid (Sugar-Free) 100 milliGRAM(s) Oral every 6 hours PRN Cough      I&O's Summary      Vital Signs Last 24 Hrs  T(C): 36.4 (01 Mar 2023 22:16), Max: 36.4 (01 Mar 2023 22:16)  T(F): 97.6 (01 Mar 2023 22:16), Max: 97.6 (01 Mar 2023 22:16)  HR: 67 (01 Mar 2023 22:16) (63 - 67)  BP: 138/83 (01 Mar 2023 22:16) (131/82 - 138/83)  BP(mean): --  RR: 17 (01 Mar 2023 13:05) (17 - 17)  SpO2: 98% (01 Mar 2023 22:16) (98% - 100%)    Parameters below as of 01 Mar 2023 22:16  Patient On (Oxygen Delivery Method): room air        CAPILLARY BLOOD GLUCOSE          PHYSICAL EXAM:  GENERAL: NAD, in good spirits, ambulating at time of encoutner  NEURO: Alert and Oriented to person, place, time. Pupils symmetric, No ptosis. No facial asymmetry or dysarthria, no tremor noted.  HEENT: No conjunctival injection or scleral icterus.   CARD: Normal rate and regular rhythm, no murmurs and no gallops appreciated.  RESP: Clear to auscultation bilaterally, No wheezes, rales or rhonchi. Good respiratory effort.  ABD: Nondistended, Soft and nontender to palpation in all quadrants, no guarding, no rigidity.   EXT: mild nonpitting ankle edema bilaterally, nontender non erythematou      LABS:                        10.9   7.09  )-----------( 219      ( 28 Feb 2023 06:29 )             35.3     Auto Eosinophil # 0.22  / Auto Eosinophil % 3.1   / Auto Neutrophil # 5.29  / Auto Neutrophil % 74.7  / BANDS % x        02-28    141  |  109<H>  |  25<H>  ----------------------------<  89  4.2   |  22  |  1.39<H>    Ca    9.6      28 Feb 2023 06:29  Mg     2.00     02-28  Phos  3.8     02-28  TPro  7.4  /  Alb  4.0  /  TBili  0.2  /  DBili  x   /  AST  14  /  ALT  12  /  AlkPhos  122<H>  02-28                  RADIOLOGY & ADDITIONAL TESTS:    Imaging Personally Reviewed:    Consultant(s) Notes Reviewed:      Care Discussed with Consultants/Other Providers:

## 2023-03-02 NOTE — PROGRESS NOTE ADULT - PROBLEM SELECTOR PLAN 7
RESOLVED  Mild Cough, no hypoxemia. No fevers. Tested positive for COVID on 2/3   Decided against remdesivir i/s/o CKD and mild infection  - continue to monitor for now  - Robitussin prn for cough  - flonase  - isolation precautions now dcd

## 2023-03-03 PROCEDURE — 93010 ELECTROCARDIOGRAM REPORT: CPT | Mod: 76

## 2023-03-03 PROCEDURE — 99232 SBSQ HOSP IP/OBS MODERATE 35: CPT | Mod: GC

## 2023-03-03 RX ADMIN — SENNA PLUS 2 TABLET(S): 8.6 TABLET ORAL at 20:20

## 2023-03-03 RX ADMIN — RISPERIDONE 1 MILLIGRAM(S): 4 TABLET ORAL at 20:20

## 2023-03-03 RX ADMIN — LIDOCAINE 1 PATCH: 4 CREAM TOPICAL at 20:24

## 2023-03-03 RX ADMIN — RISPERIDONE 1 MILLIGRAM(S): 4 TABLET ORAL at 05:44

## 2023-03-03 RX ADMIN — LIDOCAINE 1 PATCH: 4 CREAM TOPICAL at 00:37

## 2023-03-03 RX ADMIN — LIDOCAINE 1 PATCH: 4 CREAM TOPICAL at 11:27

## 2023-03-03 RX ADMIN — Medication 0.5 MILLIGRAM(S): at 17:03

## 2023-03-03 RX ADMIN — Medication 0.5 MILLIGRAM(S): at 05:44

## 2023-03-03 RX ADMIN — Medication 125 MICROGRAM(S): at 05:44

## 2023-03-03 RX ADMIN — LIDOCAINE 1 PATCH: 4 CREAM TOPICAL at 22:41

## 2023-03-03 NOTE — PROGRESS NOTE ADULT - SUBJECTIVE AND OBJECTIVE BOX
Sultan Kevin MD  PGY 1 Department of Internal Medicine        Patient is a 53y old  Female who presents with a chief complaint of behavioral change; abnormal TFT (02 Mar 2023 05:13)      SUBJECTIVE / OVERNIGHT EVENTS: Pt seen and examined. No acute overnight events. Denies fevers, chills, CP, SOB, Abdominal pain, N/V, Constipation, Diarrhea        MEDICATIONS  (STANDING):  benztropine 0.5 milliGRAM(s) Oral two times a day  levothyroxine 125 MICROGram(s) Oral daily  lidocaine   4% Patch 1 Patch Transdermal daily  polyethylene glycol 3350 17 Gram(s) Oral daily  risperiDONE   Tablet 1 milliGRAM(s) Oral <User Schedule>  risperiDONE   Tablet 1 milliGRAM(s) Oral <User Schedule>  senna 2 Tablet(s) Oral at bedtime    MEDICATIONS  (PRN):  acetaminophen     Tablet .. 650 milliGRAM(s) Oral every 6 hours PRN Temp greater or equal to 38C (100.4F), Mild Pain (1 - 3)  fluticasone propionate 50 MICROgram(s)/spray Nasal Spray 1 Spray(s) Both Nostrils two times a day PRN Congestion  guaiFENesin Oral Liquid (Sugar-Free) 100 milliGRAM(s) Oral every 6 hours PRN Cough      I&O's Summary      Vital Signs Last 24 Hrs  T(C): 36.7 (02 Mar 2023 21:08), Max: 36.7 (02 Mar 2023 21:08)  T(F): 98.1 (02 Mar 2023 21:08), Max: 98.1 (02 Mar 2023 21:08)  HR: 92 (02 Mar 2023 21:08) (74 - 92)  BP: 123/65 (02 Mar 2023 21:08) (123/65 - 145/88)  BP(mean): --  RR: 17 (02 Mar 2023 21:08) (17 - 17)  SpO2: 96% (02 Mar 2023 21:08) (96% - 100%)    Parameters below as of 02 Mar 2023 21:08  Patient On (Oxygen Delivery Method): room air        CAPILLARY BLOOD GLUCOSE          PHYSICAL EXAM:  GENERAL: NAD, in good spirits, ambulating at time of encoutner  NEURO: Alert and Oriented to person, place, time. Pupils symmetric, No ptosis. No facial asymmetry or dysarthria, no tremor noted.  HEENT: No conjunctival injection or scleral icterus.   CARD: Normal rate and regular rhythm, no murmurs and no gallops appreciated.  RESP: Clear to auscultation bilaterally, No wheezes, rales or rhonchi. Good respiratory effort.  ABD: Nondistended, Soft and nontender to palpation in all quadrants, no guarding, no rigidity.   EXT: mild nonpitting ankle edema bilaterally, nontender non erythematou     LABS:                      RADIOLOGY & ADDITIONAL TESTS:    Imaging Personally Reviewed:    Consultant(s) Notes Reviewed:      Care Discussed with Consultants/Other Providers:   Sultan Kevin MD  PGY 1 Department of Internal Medicine        Patient is a 53y old  Female who presents with a chief complaint of behavioral change; abnormal TFT (02 Mar 2023 05:13)      SUBJECTIVE / OVERNIGHT EVENTS: Pt seen and examined. No acute overnight events. Denies fevers, chills, CP, SOB, Abdominal pain, N/V, Constipation, Diarrhea. Having BMs. Feels well.         MEDICATIONS  (STANDING):  benztropine 0.5 milliGRAM(s) Oral two times a day  levothyroxine 125 MICROGram(s) Oral daily  lidocaine   4% Patch 1 Patch Transdermal daily  polyethylene glycol 3350 17 Gram(s) Oral daily  risperiDONE   Tablet 1 milliGRAM(s) Oral <User Schedule>  risperiDONE   Tablet 1 milliGRAM(s) Oral <User Schedule>  senna 2 Tablet(s) Oral at bedtime    MEDICATIONS  (PRN):  acetaminophen     Tablet .. 650 milliGRAM(s) Oral every 6 hours PRN Temp greater or equal to 38C (100.4F), Mild Pain (1 - 3)  fluticasone propionate 50 MICROgram(s)/spray Nasal Spray 1 Spray(s) Both Nostrils two times a day PRN Congestion  guaiFENesin Oral Liquid (Sugar-Free) 100 milliGRAM(s) Oral every 6 hours PRN Cough      I&O's Summary      Vital Signs Last 24 Hrs  T(C): 36.7 (02 Mar 2023 21:08), Max: 36.7 (02 Mar 2023 21:08)  T(F): 98.1 (02 Mar 2023 21:08), Max: 98.1 (02 Mar 2023 21:08)  HR: 92 (02 Mar 2023 21:08) (74 - 92)  BP: 123/65 (02 Mar 2023 21:08) (123/65 - 145/88)  BP(mean): --  RR: 17 (02 Mar 2023 21:08) (17 - 17)  SpO2: 96% (02 Mar 2023 21:08) (96% - 100%)    Parameters below as of 02 Mar 2023 21:08  Patient On (Oxygen Delivery Method): room air        CAPILLARY BLOOD GLUCOSE          PHYSICAL EXAM:  GENERAL: NAD, in good spirits, ambulating at time of encoutner  NEURO: Alert and Oriented to person, place, time. Pupils symmetric, No ptosis. No facial asymmetry or dysarthria, no tremor noted.  HEENT: No conjunctival injection or scleral icterus.   CARD: Normal rate and regular rhythm, no murmurs and no gallops appreciated.  RESP: Clear to auscultation bilaterally, No wheezes, rales or rhonchi. Good respiratory effort.  ABD: Nondistended, Soft and nontender to palpation in all quadrants, no guarding, no rigidity.   EXT: mild nonpitting ankle edema bilaterally, nontender non erythematou     LABS:                      RADIOLOGY & ADDITIONAL TESTS:    Imaging Personally Reviewed:    Consultant(s) Notes Reviewed:      Care Discussed with Consultants/Other Providers:

## 2023-03-03 NOTE — PROGRESS NOTE ADULT - PROBLEM SELECTOR PLAN 1
She has bipolar disorder previously on lithium but unclear adherence prior to admission as serum levels low.  - Risperidone 1 mg bid (7 am & 8 pm). Benztropine 0.5 bid for EPS  - Haldol 2 mg IV/IM/p.o. q6h prn for agitation/aggression (last required x1 on ) -> dc  - QTc 430s after increasing risperidone dose.  - weekly EK/22 QTc < 400  - Psychiatry following, recs appreciated -> on 3/2 noting she is not actively psychotic but still does not have capacity/insight

## 2023-03-04 PROCEDURE — 99232 SBSQ HOSP IP/OBS MODERATE 35: CPT

## 2023-03-04 RX ADMIN — LIDOCAINE 1 PATCH: 4 CREAM TOPICAL at 23:11

## 2023-03-04 RX ADMIN — LIDOCAINE 1 PATCH: 4 CREAM TOPICAL at 19:35

## 2023-03-04 RX ADMIN — RISPERIDONE 1 MILLIGRAM(S): 4 TABLET ORAL at 07:07

## 2023-03-04 RX ADMIN — POLYETHYLENE GLYCOL 3350 17 GRAM(S): 17 POWDER, FOR SOLUTION ORAL at 11:55

## 2023-03-04 RX ADMIN — Medication 0.5 MILLIGRAM(S): at 07:07

## 2023-03-04 RX ADMIN — Medication 125 MICROGRAM(S): at 07:08

## 2023-03-04 RX ADMIN — LIDOCAINE 1 PATCH: 4 CREAM TOPICAL at 11:55

## 2023-03-04 RX ADMIN — RISPERIDONE 1 MILLIGRAM(S): 4 TABLET ORAL at 22:16

## 2023-03-04 RX ADMIN — Medication 0.5 MILLIGRAM(S): at 17:54

## 2023-03-04 NOTE — PROGRESS NOTE ADULT - SUBJECTIVE AND OBJECTIVE BOX
**********************************************  Dyan Crawford, PGY-3  Internal Medicine   **********************************************     Patient is a 53y old  Female who presents with a chief complaint of behavioral change; abnormal TFT (03 Mar 2023 05:13)    SUBJECTIVE / OVERNIGHT EVENTS:     OBJECTIVE:  Vital Signs Last 24 Hrs  T(C): 36.8 (03 Mar 2023 20:48), Max: 36.8 (03 Mar 2023 20:48)  T(F): 98.2 (03 Mar 2023 20:48), Max: 98.2 (03 Mar 2023 20:48)  HR: 83 (03 Mar 2023 20:48) (68 - 83)  BP: 100/64 (03 Mar 2023 20:48) (100/64 - 134/82)  BP(mean): --  RR: 18 (03 Mar 2023 20:48) (18 - 18)  SpO2: 98% (03 Mar 2023 20:48) (98% - 98%)    Parameters below as of 03 Mar 2023 20:48  Patient On (Oxygen Delivery Method): room air        I&O's Summary    Physical Examination:      Labs:  CAPILLARY BLOOD GLUCOSE                          Imaging Personally Reviewed:      MEDICATIONS  (STANDING):  benztropine 0.5 milliGRAM(s) Oral two times a day  levothyroxine 125 MICROGram(s) Oral daily  lidocaine   4% Patch 1 Patch Transdermal daily  polyethylene glycol 3350 17 Gram(s) Oral daily  risperiDONE   Tablet 1 milliGRAM(s) Oral <User Schedule>  risperiDONE   Tablet 1 milliGRAM(s) Oral <User Schedule>  senna 2 Tablet(s) Oral at bedtime    MEDICATIONS  (PRN):  acetaminophen     Tablet .. 650 milliGRAM(s) Oral every 6 hours PRN Temp greater or equal to 38C (100.4F), Mild Pain (1 - 3)  fluticasone propionate 50 MICROgram(s)/spray Nasal Spray 1 Spray(s) Both Nostrils two times a day PRN Congestion  guaiFENesin Oral Liquid (Sugar-Free) 100 milliGRAM(s) Oral every 6 hours PRN Cough   **********************************************  Dyan Crawford, PGY-3  Internal Medicine   **********************************************     Patient is a 53y old  Female who presents with a chief complaint of behavioral change; abnormal TFT (04 Mar 2023 06:12)    SUBJECTIVE / OVERNIGHT EVENTS: No acute events overnight. Patient examined at bedside this AM, with no subjective complaints. Denies CP, palpitations, SOB, n/v/d, abdominal pain.     OBJECTIVE:  Vital Signs Last 24 Hrs  T(C): 37.1 (04 Mar 2023 07:15), Max: 37.1 (04 Mar 2023 07:15)  T(F): 98.7 (04 Mar 2023 07:15), Max: 98.7 (04 Mar 2023 07:15)  HR: 84 (04 Mar 2023 07:15) (83 - 84)  BP: 116/67 (04 Mar 2023 07:15) (100/64 - 116/67)  BP(mean): --  RR: 18 (04 Mar 2023 07:15) (18 - 18)  SpO2: 98% (04 Mar 2023 07:15) (98% - 98%)    Parameters below as of 04 Mar 2023 07:15  Patient On (Oxygen Delivery Method): room air        I&O's Summary    Physical Exam:     General: No acute distress, well-appearing    Eyes: PERRL, EOMI     ENT: MMM, no oropharyngeal lesions or erythema appreciated     Pulm: No increased WOB. CTAB. No wheezing.     CV: RRR. S1&S2+. No M/R/G appreciated.     Abdomen: +BS. Soft, NTND. No organomegaly.     MSK: Nml ROM    Extremities: No peripheral edema or cyanosis.     Neuro: A&Ox2, no focal deficits     Skin: Warm and dry. No visible rash.       Labs:  CAPILLARY BLOOD GLUCOSE                          Imaging Personally Reviewed:      MEDICATIONS  (STANDING):  benztropine 0.5 milliGRAM(s) Oral two times a day  levothyroxine 125 MICROGram(s) Oral daily  lidocaine   4% Patch 1 Patch Transdermal daily  polyethylene glycol 3350 17 Gram(s) Oral daily  risperiDONE   Tablet 1 milliGRAM(s) Oral <User Schedule>  risperiDONE   Tablet 1 milliGRAM(s) Oral <User Schedule>  senna 2 Tablet(s) Oral at bedtime    MEDICATIONS  (PRN):  acetaminophen     Tablet .. 650 milliGRAM(s) Oral every 6 hours PRN Temp greater or equal to 38C (100.4F), Mild Pain (1 - 3)  fluticasone propionate 50 MICROgram(s)/spray Nasal Spray 1 Spray(s) Both Nostrils two times a day PRN Congestion  guaiFENesin Oral Liquid (Sugar-Free) 100 milliGRAM(s) Oral every 6 hours PRN Cough

## 2023-03-04 NOTE — PROGRESS NOTE ADULT - ATTENDING COMMENTS
52F previously domiciled with recently  mother PMH bipolar disorder, hypothyroidism BIBEMS for abnormal behavior reported by cousin. Admitted for acute encephalopathy in the setting of psychosis due to lithium nonadherence with course c/b acute renal failure (no baseline cr)  and severe hypothyroidism (TSH 400s on admission). Course further c/b +COVID but saturating well on RA and currently asymptomatic. S/p isolation    Patient has been stable and calm    C/w cogentin and risperidone.

## 2023-03-05 LAB — SARS-COV-2 RNA SPEC QL NAA+PROBE: SIGNIFICANT CHANGE UP

## 2023-03-05 PROCEDURE — 99232 SBSQ HOSP IP/OBS MODERATE 35: CPT

## 2023-03-05 RX ORDER — RISPERIDONE 4 MG/1
1 TABLET ORAL
Qty: 0 | Refills: 0 | DISCHARGE
Start: 2023-03-05

## 2023-03-05 RX ORDER — BENZTROPINE MESYLATE 1 MG
1 TABLET ORAL
Qty: 0 | Refills: 0 | DISCHARGE

## 2023-03-05 RX ORDER — BENZTROPINE MESYLATE 1 MG
1 TABLET ORAL
Qty: 0 | Refills: 0 | DISCHARGE
Start: 2023-03-05

## 2023-03-05 RX ORDER — METFORMIN HYDROCHLORIDE 850 MG/1
1 TABLET ORAL
Qty: 0 | Refills: 0 | DISCHARGE

## 2023-03-05 RX ORDER — POLYETHYLENE GLYCOL 3350 17 G/17G
17 POWDER, FOR SOLUTION ORAL
Qty: 0 | Refills: 0 | DISCHARGE
Start: 2023-03-05

## 2023-03-05 RX ORDER — SENNA PLUS 8.6 MG/1
2 TABLET ORAL
Qty: 0 | Refills: 0 | DISCHARGE
Start: 2023-03-05

## 2023-03-05 RX ADMIN — Medication 0.5 MILLIGRAM(S): at 17:39

## 2023-03-05 RX ADMIN — LIDOCAINE 1 PATCH: 4 CREAM TOPICAL at 19:45

## 2023-03-05 RX ADMIN — RISPERIDONE 1 MILLIGRAM(S): 4 TABLET ORAL at 05:55

## 2023-03-05 RX ADMIN — Medication 125 MICROGRAM(S): at 05:55

## 2023-03-05 RX ADMIN — POLYETHYLENE GLYCOL 3350 17 GRAM(S): 17 POWDER, FOR SOLUTION ORAL at 11:20

## 2023-03-05 RX ADMIN — LIDOCAINE 1 PATCH: 4 CREAM TOPICAL at 23:16

## 2023-03-05 RX ADMIN — LIDOCAINE 1 PATCH: 4 CREAM TOPICAL at 11:20

## 2023-03-05 RX ADMIN — Medication 0.5 MILLIGRAM(S): at 05:55

## 2023-03-05 NOTE — PROGRESS NOTE ADULT - SUBJECTIVE AND OBJECTIVE BOX
Sultan Kevin MD  PGY 1 Department of Internal Medicine        Patient is a 53y old  Female who presents with a chief complaint of behavioral change; abnormal TFT (04 Mar 2023 06:12)      SUBJECTIVE / OVERNIGHT EVENTS: Pt seen and examined. No acute overnight events. Denies fevers, chills, CP, SOB, Abdominal pain, N/V, Constipation, Diarrhea        MEDICATIONS  (STANDING):  benztropine 0.5 milliGRAM(s) Oral two times a day  levothyroxine 125 MICROGram(s) Oral daily  lidocaine   4% Patch 1 Patch Transdermal daily  polyethylene glycol 3350 17 Gram(s) Oral daily  risperiDONE   Tablet 1 milliGRAM(s) Oral <User Schedule>  risperiDONE   Tablet 1 milliGRAM(s) Oral <User Schedule>  senna 2 Tablet(s) Oral at bedtime    MEDICATIONS  (PRN):  acetaminophen     Tablet .. 650 milliGRAM(s) Oral every 6 hours PRN Temp greater or equal to 38C (100.4F), Mild Pain (1 - 3)  fluticasone propionate 50 MICROgram(s)/spray Nasal Spray 1 Spray(s) Both Nostrils two times a day PRN Congestion  guaiFENesin Oral Liquid (Sugar-Free) 100 milliGRAM(s) Oral every 6 hours PRN Cough      I&O's Summary      Vital Signs Last 24 Hrs  T(C): 36.6 (04 Mar 2023 21:48), Max: 37.1 (04 Mar 2023 07:15)  T(F): 97.8 (04 Mar 2023 21:48), Max: 98.7 (04 Mar 2023 07:15)  HR: 70 (04 Mar 2023 21:48) (70 - 84)  BP: 125/90 (04 Mar 2023 21:48) (116/67 - 125/90)  BP(mean): --  RR: 18 (04 Mar 2023 21:48) (18 - 18)  SpO2: 98% (04 Mar 2023 21:48) (98% - 98%)    Parameters below as of 04 Mar 2023 21:48  Patient On (Oxygen Delivery Method): room air        CAPILLARY BLOOD GLUCOSE          PHYSICAL EXAM:  GENERAL: NAD, in good spirits, ambulating at time of encoutner  NEURO: Alert and Oriented to person, place, time. Pupils symmetric, No ptosis. No facial asymmetry or dysarthria, no tremor noted.  HEENT: No conjunctival injection or scleral icterus.   CARD: Normal rate and regular rhythm, no murmurs and no gallops appreciated.  RESP: Clear to auscultation bilaterally, No wheezes, rales or rhonchi. Good respiratory effort.  ABD: Nondistended, Soft and nontender to palpation in all quadrants, no guarding, no rigidity.   EXT: mild nonpitting ankle edema bilaterally, nontender non erythematou      LABS:                      RADIOLOGY & ADDITIONAL TESTS:    Imaging Personally Reviewed:    Consultant(s) Notes Reviewed:      Care Discussed with Consultants/Other Providers:   Sultan Kevin MD  PGY 1 Department of Internal Medicine        Patient is a 53y old  Female who presents with a chief complaint of behavioral change; abnormal TFT (04 Mar 2023 06:12)      SUBJECTIVE / OVERNIGHT EVENTS: Pt seen and examined. No acute overnight events. Denies fevers, chills, CP, SOB, Abdominal pain, N/V, Constipation, Diarrhea. Feels well overall.         MEDICATIONS  (STANDING):  benztropine 0.5 milliGRAM(s) Oral two times a day  levothyroxine 125 MICROGram(s) Oral daily  lidocaine   4% Patch 1 Patch Transdermal daily  polyethylene glycol 3350 17 Gram(s) Oral daily  risperiDONE   Tablet 1 milliGRAM(s) Oral <User Schedule>  risperiDONE   Tablet 1 milliGRAM(s) Oral <User Schedule>  senna 2 Tablet(s) Oral at bedtime    MEDICATIONS  (PRN):  acetaminophen     Tablet .. 650 milliGRAM(s) Oral every 6 hours PRN Temp greater or equal to 38C (100.4F), Mild Pain (1 - 3)  fluticasone propionate 50 MICROgram(s)/spray Nasal Spray 1 Spray(s) Both Nostrils two times a day PRN Congestion  guaiFENesin Oral Liquid (Sugar-Free) 100 milliGRAM(s) Oral every 6 hours PRN Cough      I&O's Summary      Vital Signs Last 24 Hrs  T(C): 36.6 (04 Mar 2023 21:48), Max: 37.1 (04 Mar 2023 07:15)  T(F): 97.8 (04 Mar 2023 21:48), Max: 98.7 (04 Mar 2023 07:15)  HR: 70 (04 Mar 2023 21:48) (70 - 84)  BP: 125/90 (04 Mar 2023 21:48) (116/67 - 125/90)  BP(mean): --  RR: 18 (04 Mar 2023 21:48) (18 - 18)  SpO2: 98% (04 Mar 2023 21:48) (98% - 98%)    Parameters below as of 04 Mar 2023 21:48  Patient On (Oxygen Delivery Method): room air        CAPILLARY BLOOD GLUCOSE          PHYSICAL EXAM:  GENERAL: NAD, sleeping comfortably  NEURO: Alert and Oriented to person, place, time. Pupils symmetric, No ptosis. No facial asymmetry or dysarthria, no tremor noted.  HEENT: No conjunctival injection or scleral icterus.   CARD: Normal rate and regular rhythm, no murmurs and no gallops appreciated.  RESP: Clear to auscultation bilaterally, No wheezes, rales or rhonchi. Good respiratory effort.  ABD: Nondistended, Soft and nontender to palpation in all quadrants, no guarding, no rigidity.   EXT: mild nonpitting ankle edema bilaterally, nontender non erythematous    LABS:                      RADIOLOGY & ADDITIONAL TESTS:    Imaging Personally Reviewed:    Consultant(s) Notes Reviewed:      Care Discussed with Consultants/Other Providers:

## 2023-03-05 NOTE — PROGRESS NOTE ADULT - PROBLEM SELECTOR PLAN 1
She has bipolar disorder previously on lithium but unclear adherence prior to admission as serum levels low.  - Risperidone 1 mg bid (7 am & 8 pm). Benztropine 0.5 bid for EPS  - Haldol 2 mg IV/IM/p.o. q6h prn for agitation/aggression (last required x1 on ) -> dc  - QTc 430s after increasing risperidone dose.  - weekly EK/22 QTc < 400  - Psychiatry following, recs appreciated -> on 3/2 noting she is not actively psychotic but still does not have capacity/insight She has bipolar disorder previously on lithium but unclear adherence prior to admission as serum levels low.  - Risperidone 1 mg bid (7 am & 8 pm). Benztropine 0.5 bid for EPS  - Haldol 2 mg IV/IM/p.o. q6h prn for agitation/aggression (last required x1 on 1/30) -> dc  - QTc 430s after increasing risperidone dose.  - weekly EKG:  3/3 QTc < 450  - Psychiatry following, recs appreciated -> on 3/2 noting she is not actively psychotic but still does not have capacity/insight

## 2023-03-06 LAB
ALBUMIN SERPL ELPH-MCNC: 3.6 G/DL — SIGNIFICANT CHANGE UP (ref 3.3–5)
ALP SERPL-CCNC: 106 U/L — SIGNIFICANT CHANGE UP (ref 40–120)
ALT FLD-CCNC: 10 U/L — SIGNIFICANT CHANGE UP (ref 4–33)
ANION GAP SERPL CALC-SCNC: 11 MMOL/L — SIGNIFICANT CHANGE UP (ref 7–14)
AST SERPL-CCNC: 14 U/L — SIGNIFICANT CHANGE UP (ref 4–32)
BASOPHILS # BLD AUTO: 0.03 K/UL — SIGNIFICANT CHANGE UP (ref 0–0.2)
BASOPHILS NFR BLD AUTO: 0.6 % — SIGNIFICANT CHANGE UP (ref 0–2)
BILIRUB SERPL-MCNC: 0.2 MG/DL — SIGNIFICANT CHANGE UP (ref 0.2–1.2)
BUN SERPL-MCNC: 28 MG/DL — HIGH (ref 7–23)
CALCIUM SERPL-MCNC: 9.4 MG/DL — SIGNIFICANT CHANGE UP (ref 8.4–10.5)
CHLORIDE SERPL-SCNC: 109 MMOL/L — HIGH (ref 98–107)
CO2 SERPL-SCNC: 21 MMOL/L — LOW (ref 22–31)
CREAT SERPL-MCNC: 1.4 MG/DL — HIGH (ref 0.5–1.3)
EGFR: 45 ML/MIN/1.73M2 — LOW
EOSINOPHIL # BLD AUTO: 0.29 K/UL — SIGNIFICANT CHANGE UP (ref 0–0.5)
EOSINOPHIL NFR BLD AUTO: 6.2 % — HIGH (ref 0–6)
GLUCOSE SERPL-MCNC: 83 MG/DL — SIGNIFICANT CHANGE UP (ref 70–99)
HCT VFR BLD CALC: 31.6 % — LOW (ref 34.5–45)
HGB BLD-MCNC: 9.8 G/DL — LOW (ref 11.5–15.5)
IANC: 2.45 K/UL — SIGNIFICANT CHANGE UP (ref 1.8–7.4)
IMM GRANULOCYTES NFR BLD AUTO: 0.2 % — SIGNIFICANT CHANGE UP (ref 0–0.9)
LYMPHOCYTES # BLD AUTO: 1.45 K/UL — SIGNIFICANT CHANGE UP (ref 1–3.3)
LYMPHOCYTES # BLD AUTO: 31 % — SIGNIFICANT CHANGE UP (ref 13–44)
MAGNESIUM SERPL-MCNC: 1.9 MG/DL — SIGNIFICANT CHANGE UP (ref 1.6–2.6)
MCHC RBC-ENTMCNC: 27.4 PG — SIGNIFICANT CHANGE UP (ref 27–34)
MCHC RBC-ENTMCNC: 31 GM/DL — LOW (ref 32–36)
MCV RBC AUTO: 88.3 FL — SIGNIFICANT CHANGE UP (ref 80–100)
MONOCYTES # BLD AUTO: 0.45 K/UL — SIGNIFICANT CHANGE UP (ref 0–0.9)
MONOCYTES NFR BLD AUTO: 9.6 % — SIGNIFICANT CHANGE UP (ref 2–14)
NEUTROPHILS # BLD AUTO: 2.45 K/UL — SIGNIFICANT CHANGE UP (ref 1.8–7.4)
NEUTROPHILS NFR BLD AUTO: 52.4 % — SIGNIFICANT CHANGE UP (ref 43–77)
NRBC # BLD: 0 /100 WBCS — SIGNIFICANT CHANGE UP (ref 0–0)
NRBC # FLD: 0 K/UL — SIGNIFICANT CHANGE UP (ref 0–0)
PHOSPHATE SERPL-MCNC: 4 MG/DL — SIGNIFICANT CHANGE UP (ref 2.5–4.5)
PLATELET # BLD AUTO: 180 K/UL — SIGNIFICANT CHANGE UP (ref 150–400)
POTASSIUM SERPL-MCNC: 4.2 MMOL/L — SIGNIFICANT CHANGE UP (ref 3.5–5.3)
POTASSIUM SERPL-SCNC: 4.2 MMOL/L — SIGNIFICANT CHANGE UP (ref 3.5–5.3)
PROT SERPL-MCNC: 6.4 G/DL — SIGNIFICANT CHANGE UP (ref 6–8.3)
RBC # BLD: 3.58 M/UL — LOW (ref 3.8–5.2)
RBC # FLD: 13.1 % — SIGNIFICANT CHANGE UP (ref 10.3–14.5)
SODIUM SERPL-SCNC: 141 MMOL/L — SIGNIFICANT CHANGE UP (ref 135–145)
WBC # BLD: 4.68 K/UL — SIGNIFICANT CHANGE UP (ref 3.8–10.5)
WBC # FLD AUTO: 4.68 K/UL — SIGNIFICANT CHANGE UP (ref 3.8–10.5)

## 2023-03-06 PROCEDURE — 99232 SBSQ HOSP IP/OBS MODERATE 35: CPT | Mod: GC

## 2023-03-06 RX ADMIN — SENNA PLUS 2 TABLET(S): 8.6 TABLET ORAL at 00:04

## 2023-03-06 RX ADMIN — RISPERIDONE 1 MILLIGRAM(S): 4 TABLET ORAL at 00:04

## 2023-03-06 RX ADMIN — LIDOCAINE 1 PATCH: 4 CREAM TOPICAL at 11:05

## 2023-03-06 RX ADMIN — LIDOCAINE 1 PATCH: 4 CREAM TOPICAL at 23:37

## 2023-03-06 RX ADMIN — RISPERIDONE 1 MILLIGRAM(S): 4 TABLET ORAL at 06:37

## 2023-03-06 RX ADMIN — RISPERIDONE 1 MILLIGRAM(S): 4 TABLET ORAL at 20:56

## 2023-03-06 RX ADMIN — Medication 0.5 MILLIGRAM(S): at 05:39

## 2023-03-06 RX ADMIN — POLYETHYLENE GLYCOL 3350 17 GRAM(S): 17 POWDER, FOR SOLUTION ORAL at 11:05

## 2023-03-06 RX ADMIN — Medication 0.5 MILLIGRAM(S): at 17:20

## 2023-03-06 RX ADMIN — LIDOCAINE 1 PATCH: 4 CREAM TOPICAL at 19:35

## 2023-03-06 RX ADMIN — Medication 125 MICROGRAM(S): at 05:38

## 2023-03-06 NOTE — PROGRESS NOTE ADULT - SUBJECTIVE AND OBJECTIVE BOX
Sultan Kevin MD  PGY 1 Department of Internal Medicine        Patient is a 53y old  Female who presents with a chief complaint of behavioral change; abnormal TFT (05 Mar 2023 05:18)      SUBJECTIVE / OVERNIGHT EVENTS: Pt seen and examined. No acute overnight events. Denies fevers, chills, CP, SOB, Abdominal pain, N/V, Constipation, Diarrhea        MEDICATIONS  (STANDING):  benztropine 0.5 milliGRAM(s) Oral two times a day  levothyroxine 125 MICROGram(s) Oral daily  lidocaine   4% Patch 1 Patch Transdermal daily  polyethylene glycol 3350 17 Gram(s) Oral daily  risperiDONE   Tablet 1 milliGRAM(s) Oral <User Schedule>  risperiDONE   Tablet 1 milliGRAM(s) Oral <User Schedule>  senna 2 Tablet(s) Oral at bedtime    MEDICATIONS  (PRN):  acetaminophen     Tablet .. 650 milliGRAM(s) Oral every 6 hours PRN Temp greater or equal to 38C (100.4F), Mild Pain (1 - 3)  fluticasone propionate 50 MICROgram(s)/spray Nasal Spray 1 Spray(s) Both Nostrils two times a day PRN Congestion  guaiFENesin Oral Liquid (Sugar-Free) 100 milliGRAM(s) Oral every 6 hours PRN Cough      I&O's Summary      Vital Signs Last 24 Hrs  T(C): 36.6 (05 Mar 2023 22:20), Max: 36.6 (05 Mar 2023 05:45)  T(F): 97.8 (05 Mar 2023 22:20), Max: 97.9 (05 Mar 2023 05:45)  HR: 69 (05 Mar 2023 22:20) (61 - 75)  BP: 128/85 (05 Mar 2023 22:20) (128/85 - 143/87)  BP(mean): --  RR: 18 (05 Mar 2023 22:20) (17 - 18)  SpO2: 100% (05 Mar 2023 22:20) (99% - 100%)    Parameters below as of 05 Mar 2023 22:20  Patient On (Oxygen Delivery Method): room air        CAPILLARY BLOOD GLUCOSE          PHYSICAL EXAM:  GENERAL: NAD, sleeping comfortably  NEURO: Alert and Oriented to person, place, time. Pupils symmetric, No ptosis. No facial asymmetry or dysarthria, no tremor noted.  HEENT: No conjunctival injection or scleral icterus.   CARD: Normal rate and regular rhythm, no murmurs and no gallops appreciated.  RESP: Clear to auscultation bilaterally, No wheezes, rales or rhonchi. Good respiratory effort.  ABD: Nondistended, Soft and nontender to palpation in all quadrants, no guarding, no rigidity.   EXT: mild nonpitting ankle edema bilaterally, nontender non erythematous         LABS:                      RADIOLOGY & ADDITIONAL TESTS:    Imaging Personally Reviewed:    Consultant(s) Notes Reviewed:      Care Discussed with Consultants/Other Providers:   Sultan Kevin MD  PGY 1 Department of Internal Medicine        Patient is a 53y old  Female who presents with a chief complaint of behavioral change; abnormal TFT (05 Mar 2023 05:18)      SUBJECTIVE / OVERNIGHT EVENTS: Pt seen and examined. No acute overnight events. Denies fevers, chills, CP, SOB, Abdominal pain, N/V, Constipation, Diarrhea. Feels well overall.        MEDICATIONS  (STANDING):  benztropine 0.5 milliGRAM(s) Oral two times a day  levothyroxine 125 MICROGram(s) Oral daily  lidocaine   4% Patch 1 Patch Transdermal daily  polyethylene glycol 3350 17 Gram(s) Oral daily  risperiDONE   Tablet 1 milliGRAM(s) Oral <User Schedule>  risperiDONE   Tablet 1 milliGRAM(s) Oral <User Schedule>  senna 2 Tablet(s) Oral at bedtime    MEDICATIONS  (PRN):  acetaminophen     Tablet .. 650 milliGRAM(s) Oral every 6 hours PRN Temp greater or equal to 38C (100.4F), Mild Pain (1 - 3)  fluticasone propionate 50 MICROgram(s)/spray Nasal Spray 1 Spray(s) Both Nostrils two times a day PRN Congestion  guaiFENesin Oral Liquid (Sugar-Free) 100 milliGRAM(s) Oral every 6 hours PRN Cough      I&O's Summary      Vital Signs Last 24 Hrs  T(C): 36.6 (05 Mar 2023 22:20), Max: 36.6 (05 Mar 2023 05:45)  T(F): 97.8 (05 Mar 2023 22:20), Max: 97.9 (05 Mar 2023 05:45)  HR: 69 (05 Mar 2023 22:20) (61 - 75)  BP: 128/85 (05 Mar 2023 22:20) (128/85 - 143/87)  BP(mean): --  RR: 18 (05 Mar 2023 22:20) (17 - 18)  SpO2: 100% (05 Mar 2023 22:20) (99% - 100%)    Parameters below as of 05 Mar 2023 22:20  Patient On (Oxygen Delivery Method): room air        CAPILLARY BLOOD GLUCOSE          PHYSICAL EXAM:  GENERAL: NAD, sleeping comfortably  NEURO: Alert and Oriented to person, place, time. Pupils symmetric, No ptosis. No facial asymmetry or dysarthria, no tremor noted.  HEENT: No conjunctival injection or scleral icterus.   CARD: Normal rate and regular rhythm, no murmurs and no gallops appreciated.  RESP: Clear to auscultation bilaterally, No wheezes, rales or rhonchi. Good respiratory effort.  ABD: Nondistended, Soft and nontender to palpation in all quadrants, no guarding, no rigidity.   EXT: mild nonpitting ankle edema bilaterally, nontender non erythematous         LABS:                      RADIOLOGY & ADDITIONAL TESTS:    Imaging Personally Reviewed:    Consultant(s) Notes Reviewed:      Care Discussed with Consultants/Other Providers:

## 2023-03-06 NOTE — PROGRESS NOTE ADULT - ATTENDING COMMENTS
52F previously domiciled with recently  mother PMH bipolar disorder, hypothyroidism BIBEMS for abnormal behavior reported by cousin. Admitted for acute encephalopathy in the setting of psychosis due to lithium nonadherence with course c/b acute renal failure (no baseline cr)  and severe hypothyroidism (TSH 400s on admission). Course further c/b +COVID but saturating well on RA and currently asymptomatic. S/p isolation    Patient has been stable and calm    Creatinine levels increasing slightly. Will continue to monitor. Encourage PO. Monitor urinary output   C/w cogentin and risperidone.

## 2023-03-06 NOTE — PROGRESS NOTE ADULT - PROBLEM SELECTOR PLAN 4
In the setting of her diabetes. A1c now within normal range.  - BMP weekly, remains at baseline SCr. CKD 3A  - d/c metformin on discharge In the setting of her diabetes. A1c now within normal range.  - BMP weekly, remains at baseline SCr (Basline cr is -----) . CKD 3A  - d/c metformin on discharge

## 2023-03-06 NOTE — PROGRESS NOTE ADULT - PROBLEM SELECTOR PLAN 1
She has bipolar disorder previously on lithium but unclear adherence prior to admission as serum levels low.  - Risperidone 1 mg bid (7 am & 8 pm). Benztropine 0.5 bid for EPS  - Haldol 2 mg IV/IM/p.o. q6h prn for agitation/aggression (last required x1 on 1/30) -> dc  - QTc 430s after increasing risperidone dose.  - weekly EKG:  3/3 QTc < 450  - Psychiatry following, recs appreciated -> on 3/2 noting she is not actively psychotic but still does not have capacity/insight

## 2023-03-07 LAB
ANION GAP SERPL CALC-SCNC: 12 MMOL/L — SIGNIFICANT CHANGE UP (ref 7–14)
BUN SERPL-MCNC: 23 MG/DL — SIGNIFICANT CHANGE UP (ref 7–23)
CALCIUM SERPL-MCNC: 9.5 MG/DL — SIGNIFICANT CHANGE UP (ref 8.4–10.5)
CHLORIDE SERPL-SCNC: 107 MMOL/L — SIGNIFICANT CHANGE UP (ref 98–107)
CO2 SERPL-SCNC: 21 MMOL/L — LOW (ref 22–31)
CREAT SERPL-MCNC: 1.33 MG/DL — HIGH (ref 0.5–1.3)
EGFR: 48 ML/MIN/1.73M2 — LOW
GLUCOSE SERPL-MCNC: 90 MG/DL — SIGNIFICANT CHANGE UP (ref 70–99)
MAGNESIUM SERPL-MCNC: 2.1 MG/DL — SIGNIFICANT CHANGE UP (ref 1.6–2.6)
PHOSPHATE SERPL-MCNC: 4.1 MG/DL — SIGNIFICANT CHANGE UP (ref 2.5–4.5)
POTASSIUM SERPL-MCNC: 4 MMOL/L — SIGNIFICANT CHANGE UP (ref 3.5–5.3)
POTASSIUM SERPL-SCNC: 4 MMOL/L — SIGNIFICANT CHANGE UP (ref 3.5–5.3)
SODIUM SERPL-SCNC: 140 MMOL/L — SIGNIFICANT CHANGE UP (ref 135–145)

## 2023-03-07 PROCEDURE — 99232 SBSQ HOSP IP/OBS MODERATE 35: CPT | Mod: GC

## 2023-03-07 RX ADMIN — LIDOCAINE 1 PATCH: 4 CREAM TOPICAL at 11:29

## 2023-03-07 RX ADMIN — LIDOCAINE 1 PATCH: 4 CREAM TOPICAL at 23:45

## 2023-03-07 RX ADMIN — LIDOCAINE 1 PATCH: 4 CREAM TOPICAL at 19:30

## 2023-03-07 RX ADMIN — Medication 0.5 MILLIGRAM(S): at 07:21

## 2023-03-07 RX ADMIN — RISPERIDONE 1 MILLIGRAM(S): 4 TABLET ORAL at 21:49

## 2023-03-07 RX ADMIN — RISPERIDONE 1 MILLIGRAM(S): 4 TABLET ORAL at 07:21

## 2023-03-07 RX ADMIN — Medication 125 MICROGRAM(S): at 07:21

## 2023-03-07 RX ADMIN — Medication 0.5 MILLIGRAM(S): at 17:51

## 2023-03-07 NOTE — PROGRESS NOTE ADULT - PROBLEM SELECTOR PLAN 1
She has bipolar disorder previously on lithium but unclear adherence prior to admission as serum levels low.  - Risperidone 1 mg bid (7 am & 8 pm). Benztropine 0.5 bid for EPS  - Haldol 2 mg IV/IM/p.o. q6h prn for agitation/aggression (last required x1 on 1/30) -> dc  - QTc 430s after increasing risperidone dose.  - weekly EKG:  3/3 QTc < 450  - Psychiatry following, recs appreciated -> on 3/2 noting she is not actively psychotic but still does not have capacity/insight She has bipolar disorder previously on lithium but unclear adherence prior to admission as serum levels low.  - Risperidone 1 mg bid (7 am & 8 pm). Benztropine 0.5 bid for EPS  - Haldol 2 mg IV/IM/p.o. q6h prn for agitation/aggression (last required x1 on 1/30) -> discontinued  - QTc 430s after increasing risperidone dose.  - weekly EKG:  3/3 QTc < 450  - Psychiatry following, recs appreciated -> on 3/2 noting she is not actively psychotic but still does not have capacity/insight

## 2023-03-07 NOTE — PROGRESS NOTE ADULT - ATTENDING COMMENTS
52F previously domiciled with recently  mother PMH bipolar disorder, hypothyroidism BIBEMS for abnormal behavior reported by cousin. Admitted for acute encephalopathy in the setting of psychosis due to lithium nonadherence with course c/b acute renal failure (no baseline cr)  and severe hypothyroidism (TSH 400s on admission). Course further c/b +COVID but saturating well on RA and currently asymptomatic. S/p isolation    C/w cogentin and risperidone.  Patient has been stable and calm. She is optimized for discharge. Will f/u w/ SW/CM daily in re to progress

## 2023-03-07 NOTE — PROGRESS NOTE ADULT - PROBLEM SELECTOR PLAN 4
In the setting of her diabetes. A1c now within normal range.  - BMP weekly, remains at baseline SCr (Basline cr is -----) . CKD 3A  - d/c metformin on discharge In the setting of her diabetes. A1c now within normal range.  - BMP weekly, remains at baseline SCr (Basline cr is appears to be 1.2-1.5) . CKD 3A  - d/c metformin on discharge

## 2023-03-07 NOTE — PROGRESS NOTE ADULT - SUBJECTIVE AND OBJECTIVE BOX
Sultan Kevin MD  PGY 1 Department of Internal Medicine        Patient is a 53y old  Female who presents with a chief complaint of behavioral change; abnormal TFT (06 Mar 2023 05:11)      SUBJECTIVE / OVERNIGHT EVENTS: Pt seen and examined. No acute overnight events. Denies fevers, chills, CP, SOB, Abdominal pain, N/V, Constipation, Diarrhea        MEDICATIONS  (STANDING):  benztropine 0.5 milliGRAM(s) Oral two times a day  levothyroxine 125 MICROGram(s) Oral daily  lidocaine   4% Patch 1 Patch Transdermal daily  polyethylene glycol 3350 17 Gram(s) Oral daily  risperiDONE   Tablet 1 milliGRAM(s) Oral <User Schedule>  risperiDONE   Tablet 1 milliGRAM(s) Oral <User Schedule>  senna 2 Tablet(s) Oral at bedtime    MEDICATIONS  (PRN):  acetaminophen     Tablet .. 650 milliGRAM(s) Oral every 6 hours PRN Temp greater or equal to 38C (100.4F), Mild Pain (1 - 3)  fluticasone propionate 50 MICROgram(s)/spray Nasal Spray 1 Spray(s) Both Nostrils two times a day PRN Congestion  guaiFENesin Oral Liquid (Sugar-Free) 100 milliGRAM(s) Oral every 6 hours PRN Cough      I&O's Summary      Vital Signs Last 24 Hrs  T(C): 36.8 (06 Mar 2023 21:37), Max: 36.8 (06 Mar 2023 21:37)  T(F): 98.3 (06 Mar 2023 21:37), Max: 98.3 (06 Mar 2023 21:37)  HR: 60 (06 Mar 2023 21:37) (60 - 61)  BP: 125/70 (06 Mar 2023 21:37) (124/70 - 138/88)  BP(mean): --  RR: 16 (06 Mar 2023 12:45) (16 - 18)  SpO2: 100% (06 Mar 2023 21:37) (100% - 100%)    Parameters below as of 06 Mar 2023 21:37  Patient On (Oxygen Delivery Method): room air        CAPILLARY BLOOD GLUCOSE          PHYSICAL EXAM:  GENERAL: NAD, sleeping comfortably  NEURO: Alert and Oriented to person, place, time. Pupils symmetric, No ptosis. No facial asymmetry or dysarthria, no tremor noted.  HEENT: No conjunctival injection or scleral icterus.   CARD: Normal rate and regular rhythm, no murmurs and no gallops appreciated.  RESP: Clear to auscultation bilaterally, No wheezes, rales or rhonchi. Good respiratory effort.  ABD: Nondistended, Soft and nontender to palpation in all quadrants, no guarding, no rigidity.   EXT: mild nonpitting ankle edema bilaterally, nontender non erythematous         LABS:                        9.8    4.68  )-----------( 180      ( 06 Mar 2023 06:00 )             31.6     Auto Eosinophil # 0.29  / Auto Eosinophil % 6.2   / Auto Neutrophil # 2.45  / Auto Neutrophil % 52.4  / BANDS % x        03-06    141  |  109<H>  |  28<H>  ----------------------------<  83  4.2   |  21<L>  |  1.40<H>    Ca    9.4      06 Mar 2023 06:00  Mg     1.90     03-06  Phos  4.0     03-06  TPro  6.4  /  Alb  3.6  /  TBili  0.2  /  DBili  x   /  AST  14  /  ALT  10  /  AlkPhos  106  03-06                  RADIOLOGY & ADDITIONAL TESTS:    Imaging Personally Reviewed:    Consultant(s) Notes Reviewed:      Care Discussed with Consultants/Other Providers:   Sultan Kevin MD  PGY 1 Department of Internal Medicine        Patient is a 53y old  Female who presents with a chief complaint of behavioral change; abnormal TFT (06 Mar 2023 05:11)      SUBJECTIVE / OVERNIGHT EVENTS: Pt seen and examined. No acute overnight events. Denies fevers, chills, CP, SOB, Abdominal pain, N/V, Constipation, Diarrhea. Feel fine with no complaints.         MEDICATIONS  (STANDING):  benztropine 0.5 milliGRAM(s) Oral two times a day  levothyroxine 125 MICROGram(s) Oral daily  lidocaine   4% Patch 1 Patch Transdermal daily  polyethylene glycol 3350 17 Gram(s) Oral daily  risperiDONE   Tablet 1 milliGRAM(s) Oral <User Schedule>  risperiDONE   Tablet 1 milliGRAM(s) Oral <User Schedule>  senna 2 Tablet(s) Oral at bedtime    MEDICATIONS  (PRN):  acetaminophen     Tablet .. 650 milliGRAM(s) Oral every 6 hours PRN Temp greater or equal to 38C (100.4F), Mild Pain (1 - 3)  fluticasone propionate 50 MICROgram(s)/spray Nasal Spray 1 Spray(s) Both Nostrils two times a day PRN Congestion  guaiFENesin Oral Liquid (Sugar-Free) 100 milliGRAM(s) Oral every 6 hours PRN Cough      I&O's Summary      Vital Signs Last 24 Hrs  T(C): 36.8 (06 Mar 2023 21:37), Max: 36.8 (06 Mar 2023 21:37)  T(F): 98.3 (06 Mar 2023 21:37), Max: 98.3 (06 Mar 2023 21:37)  HR: 60 (06 Mar 2023 21:37) (60 - 61)  BP: 125/70 (06 Mar 2023 21:37) (124/70 - 138/88)  BP(mean): --  RR: 16 (06 Mar 2023 12:45) (16 - 18)  SpO2: 100% (06 Mar 2023 21:37) (100% - 100%)    Parameters below as of 06 Mar 2023 21:37  Patient On (Oxygen Delivery Method): room air        CAPILLARY BLOOD GLUCOSE          PHYSICAL EXAM:  GENERAL: NAD, sleeping comfortably  NEURO: Alert and Oriented to person, place, time. Pupils symmetric, No ptosis. No facial asymmetry or dysarthria, no tremor noted.  HEENT: No conjunctival injection or scleral icterus.   CARD: Normal rate and regular rhythm, no murmurs and no gallops appreciated.  RESP: Clear to auscultation bilaterally, No wheezes, rales or rhonchi. Good respiratory effort.  ABD: Nondistended, Soft and nontender to palpation in all quadrants, no guarding, no rigidity.   EXT: mild nonpitting ankle edema bilaterally, nontender non erythematous         LABS:                        9.8    4.68  )-----------( 180      ( 06 Mar 2023 06:00 )             31.6     Auto Eosinophil # 0.29  / Auto Eosinophil % 6.2   / Auto Neutrophil # 2.45  / Auto Neutrophil % 52.4  / BANDS % x        03-06    141  |  109<H>  |  28<H>  ----------------------------<  83  4.2   |  21<L>  |  1.40<H>    Ca    9.4      06 Mar 2023 06:00  Mg     1.90     03-06  Phos  4.0     03-06  TPro  6.4  /  Alb  3.6  /  TBili  0.2  /  DBili  x   /  AST  14  /  ALT  10  /  AlkPhos  106  03-06                  RADIOLOGY & ADDITIONAL TESTS:    Imaging Personally Reviewed:    Consultant(s) Notes Reviewed:      Care Discussed with Consultants/Other Providers:

## 2023-03-08 PROCEDURE — 99232 SBSQ HOSP IP/OBS MODERATE 35: CPT | Mod: GC

## 2023-03-08 RX ADMIN — Medication 0.5 MILLIGRAM(S): at 17:03

## 2023-03-08 RX ADMIN — RISPERIDONE 1 MILLIGRAM(S): 4 TABLET ORAL at 06:40

## 2023-03-08 RX ADMIN — LIDOCAINE 1 PATCH: 4 CREAM TOPICAL at 11:44

## 2023-03-08 RX ADMIN — Medication 125 MICROGRAM(S): at 06:40

## 2023-03-08 RX ADMIN — Medication 0.5 MILLIGRAM(S): at 06:40

## 2023-03-08 RX ADMIN — LIDOCAINE 1 PATCH: 4 CREAM TOPICAL at 23:45

## 2023-03-08 RX ADMIN — RISPERIDONE 1 MILLIGRAM(S): 4 TABLET ORAL at 22:00

## 2023-03-08 RX ADMIN — LIDOCAINE 1 PATCH: 4 CREAM TOPICAL at 19:24

## 2023-03-08 NOTE — PROGRESS NOTE ADULT - PROBLEM SELECTOR PLAN 4
In the setting of her diabetes. A1c now within normal range.  - BMP weekly, remains at baseline SCr (Basline cr is appears to be 1.2-1.5) . CKD 3A  - d/c metformin on discharge

## 2023-03-08 NOTE — PROGRESS NOTE ADULT - ATTENDING COMMENTS
52F previously domiciled with recently  mother PMH bipolar disorder, hypothyroidism BIBEMS for abnormal behavior reported by cousin. Admitted for acute encephalopathy in the setting of psychosis due to lithium nonadherence with course c/b acute renal failure (no baseline cr)  and severe hypothyroidism (TSH 400s on admission). Course further c/b +COVID but saturating well on RA and currently asymptomatic. S/p isolation. Stable and pending placement arrangements.     C/w cogentin and risperidone.  Patient has been stable and calm. She is optimized for discharge. Will f/u w/ SW/CM daily in re to progress

## 2023-03-08 NOTE — PROGRESS NOTE ADULT - SUBJECTIVE AND OBJECTIVE BOX
Sultan Kevin MD  PGY 1 Department of Internal Medicine        Patient is a 53y old  Female who presents with a chief complaint of behavioral change; abnormal TFT (07 Mar 2023 05:02)      SUBJECTIVE / OVERNIGHT EVENTS: Pt seen and examined. No acute overnight events. Denies fevers, chills, CP, SOB, Abdominal pain, N/V, Constipation, Diarrhea        MEDICATIONS  (STANDING):  benztropine 0.5 milliGRAM(s) Oral two times a day  levothyroxine 125 MICROGram(s) Oral daily  lidocaine   4% Patch 1 Patch Transdermal daily  polyethylene glycol 3350 17 Gram(s) Oral daily  risperiDONE   Tablet 1 milliGRAM(s) Oral <User Schedule>  risperiDONE   Tablet 1 milliGRAM(s) Oral <User Schedule>  senna 2 Tablet(s) Oral at bedtime    MEDICATIONS  (PRN):  acetaminophen     Tablet .. 650 milliGRAM(s) Oral every 6 hours PRN Temp greater or equal to 38C (100.4F), Mild Pain (1 - 3)  fluticasone propionate 50 MICROgram(s)/spray Nasal Spray 1 Spray(s) Both Nostrils two times a day PRN Congestion  guaiFENesin Oral Liquid (Sugar-Free) 100 milliGRAM(s) Oral every 6 hours PRN Cough      I&O's Summary      Vital Signs Last 24 Hrs  T(C): 36.6 (08 Mar 2023 05:14), Max: 36.6 (07 Mar 2023 21:08)  T(F): 97.8 (08 Mar 2023 05:14), Max: 97.8 (07 Mar 2023 21:08)  HR: 63 (08 Mar 2023 05:14) (63 - 91)  BP: 113/68 (08 Mar 2023 05:14) (113/68 - 129/86)  BP(mean): --  RR: 17 (07 Mar 2023 21:08) (16 - 17)  SpO2: 99% (08 Mar 2023 05:14) (94% - 99%)    Parameters below as of 08 Mar 2023 05:14  Patient On (Oxygen Delivery Method): room air        CAPILLARY BLOOD GLUCOSE          PHYSICAL EXAM:  GENERAL: NAD, sleeping comfortably  NEURO: Alert and Oriented to person, place, time. Pupils symmetric, No ptosis. No facial asymmetry or dysarthria, no tremor noted.  HEENT: No conjunctival injection or scleral icterus.   CARD: Normal rate and regular rhythm, no murmurs and no gallops appreciated.  RESP: Clear to auscultation bilaterally, No wheezes, rales or rhonchi. Good respiratory effort.  ABD: Nondistended, Soft and nontender to palpation in all quadrants, no guarding, no rigidity.   EXT: mild nonpitting ankle edema bilaterally, nontender non erythematous       LABS:    03-07    140  |  107  |  23  ----------------------------<  90  4.0   |  21<L>  |  1.33<H>    Ca    9.5      07 Mar 2023 08:11  Mg     2.10     03-07  Phos  4.1     03-07                  RADIOLOGY & ADDITIONAL TESTS:    Imaging Personally Reviewed:    Consultant(s) Notes Reviewed:      Care Discussed with Consultants/Other Providers:   Sultan Kevin MD  PGY 1 Department of Internal Medicine        Patient is a 53y old  Female who presents with a chief complaint of behavioral change; abnormal TFT (07 Mar 2023 05:02)      SUBJECTIVE / OVERNIGHT EVENTS: Pt seen and examined. No acute overnight events. Denies fevers, chills, CP, SOB, Abdominal pain, N/V, Constipation, Diarrhea. Patient sleeping comfortably and feels well overall.        MEDICATIONS  (STANDING):  benztropine 0.5 milliGRAM(s) Oral two times a day  levothyroxine 125 MICROGram(s) Oral daily  lidocaine   4% Patch 1 Patch Transdermal daily  polyethylene glycol 3350 17 Gram(s) Oral daily  risperiDONE   Tablet 1 milliGRAM(s) Oral <User Schedule>  risperiDONE   Tablet 1 milliGRAM(s) Oral <User Schedule>  senna 2 Tablet(s) Oral at bedtime    MEDICATIONS  (PRN):  acetaminophen     Tablet .. 650 milliGRAM(s) Oral every 6 hours PRN Temp greater or equal to 38C (100.4F), Mild Pain (1 - 3)  fluticasone propionate 50 MICROgram(s)/spray Nasal Spray 1 Spray(s) Both Nostrils two times a day PRN Congestion  guaiFENesin Oral Liquid (Sugar-Free) 100 milliGRAM(s) Oral every 6 hours PRN Cough      I&O's Summary      Vital Signs Last 24 Hrs  T(C): 36.6 (08 Mar 2023 05:14), Max: 36.6 (07 Mar 2023 21:08)  T(F): 97.8 (08 Mar 2023 05:14), Max: 97.8 (07 Mar 2023 21:08)  HR: 63 (08 Mar 2023 05:14) (63 - 91)  BP: 113/68 (08 Mar 2023 05:14) (113/68 - 129/86)  BP(mean): --  RR: 17 (07 Mar 2023 21:08) (16 - 17)  SpO2: 99% (08 Mar 2023 05:14) (94% - 99%)    Parameters below as of 08 Mar 2023 05:14  Patient On (Oxygen Delivery Method): room air        CAPILLARY BLOOD GLUCOSE          PHYSICAL EXAM:  GENERAL: NAD, sleeping comfortably  NEURO: Alert and Oriented to person, place, time. Pupils symmetric, No ptosis. No facial asymmetry or dysarthria, no tremor noted.  HEENT: No conjunctival injection or scleral icterus.   CARD: Normal rate and regular rhythm, no murmurs and no gallops appreciated.  RESP: Clear to auscultation bilaterally, No wheezes, rales or rhonchi. Good respiratory effort.  ABD: Nondistended, Soft and nontender to palpation in all quadrants, no guarding, no rigidity.   EXT: mild nonpitting ankle edema bilaterally, nontender non erythematous       LABS:    03-07    140  |  107  |  23  ----------------------------<  90  4.0   |  21<L>  |  1.33<H>    Ca    9.5      07 Mar 2023 08:11  Mg     2.10     03-07  Phos  4.1     03-07                  RADIOLOGY & ADDITIONAL TESTS:    Imaging Personally Reviewed:    Consultant(s) Notes Reviewed:      Care Discussed with Consultants/Other Providers:

## 2023-03-08 NOTE — PROGRESS NOTE ADULT - PROBLEM SELECTOR PLAN 1
She has bipolar disorder previously on lithium but unclear adherence prior to admission as serum levels low.  - Risperidone 1 mg bid (7 am & 8 pm). Benztropine 0.5 bid for EPS  - Haldol 2 mg IV/IM/p.o. q6h prn for agitation/aggression (last required x1 on 1/30) -> discontinued  - QTc 430s after increasing risperidone dose.  - weekly EKG:  3/3 QTc < 450  - Psychiatry following, recs appreciated -> on 3/2 noting she is not actively psychotic but still does not have capacity/insight

## 2023-03-09 LAB — SARS-COV-2 RNA SPEC QL NAA+PROBE: SIGNIFICANT CHANGE UP

## 2023-03-09 PROCEDURE — 93010 ELECTROCARDIOGRAM REPORT: CPT

## 2023-03-09 PROCEDURE — 99231 SBSQ HOSP IP/OBS SF/LOW 25: CPT | Mod: GC

## 2023-03-09 RX ADMIN — RISPERIDONE 1 MILLIGRAM(S): 4 TABLET ORAL at 22:16

## 2023-03-09 RX ADMIN — Medication 125 MICROGRAM(S): at 06:27

## 2023-03-09 RX ADMIN — Medication 0.5 MILLIGRAM(S): at 18:20

## 2023-03-09 RX ADMIN — Medication 0.5 MILLIGRAM(S): at 06:28

## 2023-03-09 RX ADMIN — LIDOCAINE 1 PATCH: 4 CREAM TOPICAL at 19:02

## 2023-03-09 RX ADMIN — RISPERIDONE 1 MILLIGRAM(S): 4 TABLET ORAL at 07:32

## 2023-03-09 RX ADMIN — LIDOCAINE 1 PATCH: 4 CREAM TOPICAL at 12:47

## 2023-03-09 NOTE — CHART NOTE - NSCHARTNOTESELECT_GEN_ALL_CORE
Endocrine/Event Note
Nutrition Services
Follow-up/Nutrition Services
Nutrition Services
Nutrition Services
endocrine/Event Note

## 2023-03-09 NOTE — CHART NOTE - NSCHARTNOTEFT_GEN_A_CORE
Source: Patient [x]    Family [ ]     other [x ] chart review    Patient seen for nutrition f/u. 52 year old female with a PMH of bipolar disorder, hypothyroidism, admitted for acute encephalopathy most likely in the setting of psychosis due to lithium nonadherence with course complicated by acute renal failure and hypothyroidism, now resolved. Pending placement to group home + guardianship per chart.    Patient reports good appetite. No GI distress or chewing/swallowing difficulties reported. Noted w/ +1 L ankle edema and no pressure injuries per RN flow sheet. Instructed patient to focus more on protein/fiber rich foods and limit sugary beverage intake for weight stability.    Diet : Consistent carbohydrate, DASH/TLC    Current Weight: 70 kg (3/9) per bed scale  68.9 kg (2/22)  61 kg (1/18)  59.4 kg (1/13)  Weight Change: +17.8% weight gain x 2 months. Likely from good PO intake and limited mobility.    Pertinent Medications: MEDICATIONS  (STANDING):  benztropine 0.5 milliGRAM(s) Oral two times a day  levothyroxine 125 MICROGram(s) Oral daily  lidocaine   4% Patch 1 Patch Transdermal daily  polyethylene glycol 3350 17 Gram(s) Oral daily  risperiDONE   Tablet 1 milliGRAM(s) Oral <User Schedule>  risperiDONE   Tablet 1 milliGRAM(s) Oral <User Schedule>  senna 2 Tablet(s) Oral at bedtime    MEDICATIONS  (PRN):  acetaminophen     Tablet .. 650 milliGRAM(s) Oral every 6 hours PRN Temp greater or equal to 38C (100.4F), Mild Pain (1 - 3)  fluticasone propionate 50 MICROgram(s)/spray Nasal Spray 1 Spray(s) Both Nostrils two times a day PRN Congestion  guaiFENesin Oral Liquid (Sugar-Free) 100 milliGRAM(s) Oral every 6 hours PRN Cough    Pertinent Labs:  03-07 Na140 mmol/L Glu 90 mg/dL K+ 4.0 mmol/L Cr  1.33 mg/dL<H> BUN 23 mg/dL 03-07 Phos 4.1 mg/dL 03-06 Alb 3.6 g/dL    Estimated Needs: [ x] no change since previous assessment    Previous Nutrition Diagnosis: Inadequate oral intake    Nutrition Diagnosis is [ ] ongoing  [ ] resolved [x ] not applicable     New Nutrition Diagnosis: Unintentional weight gain related to good PO intake/limited mobility as evidenced by patient w/ 17.8% weight gain x 2 months    Recommend  - continue diet as ordered  - obtain weekly weight and document PO intake to monitor trend    Monitoring and Evaluation:   [x ] PO intake [x ] Tolerance to diet prescription [x ] weights [x ] follow up per protocol

## 2023-03-09 NOTE — PROGRESS NOTE ADULT - SUBJECTIVE AND OBJECTIVE BOX
Sultan Kevin MD  PGY 1 Department of Internal Medicine        Patient is a 53y old  Female who presents with a chief complaint of behavioral change; abnormal TFT (08 Mar 2023 06:51)      SUBJECTIVE / OVERNIGHT EVENTS: Pt seen and examined. No acute overnight events. Denies fevers, chills, CP, SOB, Abdominal pain, N/V, Constipation, Diarrhea        MEDICATIONS  (STANDING):  benztropine 0.5 milliGRAM(s) Oral two times a day  levothyroxine 125 MICROGram(s) Oral daily  lidocaine   4% Patch 1 Patch Transdermal daily  polyethylene glycol 3350 17 Gram(s) Oral daily  risperiDONE   Tablet 1 milliGRAM(s) Oral <User Schedule>  risperiDONE   Tablet 1 milliGRAM(s) Oral <User Schedule>  senna 2 Tablet(s) Oral at bedtime    MEDICATIONS  (PRN):  acetaminophen     Tablet .. 650 milliGRAM(s) Oral every 6 hours PRN Temp greater or equal to 38C (100.4F), Mild Pain (1 - 3)  fluticasone propionate 50 MICROgram(s)/spray Nasal Spray 1 Spray(s) Both Nostrils two times a day PRN Congestion  guaiFENesin Oral Liquid (Sugar-Free) 100 milliGRAM(s) Oral every 6 hours PRN Cough      I&O's Summary      Vital Signs Last 24 Hrs  T(C): 36.3 (08 Mar 2023 20:58), Max: 36.6 (08 Mar 2023 05:14)  T(F): 97.3 (08 Mar 2023 20:58), Max: 97.8 (08 Mar 2023 05:14)  HR: 77 (08 Mar 2023 20:58) (63 - 77)  BP: 119/82 (08 Mar 2023 20:58) (113/68 - 120/69)  BP(mean): --  RR: 17 (08 Mar 2023 20:58) (17 - 17)  SpO2: 100% (08 Mar 2023 20:58) (99% - 100%)    Parameters below as of 08 Mar 2023 20:58  Patient On (Oxygen Delivery Method): room air        CAPILLARY BLOOD GLUCOSE          PHYSICAL EXAM:  GENERAL: NAD, sleeping comfortably  NEURO: Alert and Oriented to person, place, time. Pupils symmetric, No ptosis. No facial asymmetry or dysarthria, no tremor noted.  HEENT: No conjunctival injection or scleral icterus.   CARD: Normal rate and regular rhythm, no murmurs and no gallops appreciated.  RESP: Clear to auscultation bilaterally, No wheezes, rales or rhonchi. Good respiratory effort.  ABD: Nondistended, Soft and nontender to palpation in all quadrants, no guarding, no rigidity.   EXT: mild nonpitting ankle edema bilaterally, nontender non erythematous            LABS:    03-07    140  |  107  |  23  ----------------------------<  90  4.0   |  21<L>  |  1.33<H>    Ca    9.5      07 Mar 2023 08:11  Mg     2.10     03-07  Phos  4.1     03-07                  RADIOLOGY & ADDITIONAL TESTS:    Imaging Personally Reviewed:    Consultant(s) Notes Reviewed:      Care Discussed with Consultants/Other Providers:   Sultan Kevin MD  PGY 1 Department of Internal Medicine        Patient is a 53y old  Female who presents with a chief complaint of behavioral change; abnormal TFT (08 Mar 2023 06:51)      SUBJECTIVE / OVERNIGHT EVENTS: Pt seen and examined. No acute overnight events. Denies fevers, chills, CP, SOB, Abdominal pain, N/V, Constipation, Diarrhea. Feels well overall.        MEDICATIONS  (STANDING):  benztropine 0.5 milliGRAM(s) Oral two times a day  levothyroxine 125 MICROGram(s) Oral daily  lidocaine   4% Patch 1 Patch Transdermal daily  polyethylene glycol 3350 17 Gram(s) Oral daily  risperiDONE   Tablet 1 milliGRAM(s) Oral <User Schedule>  risperiDONE   Tablet 1 milliGRAM(s) Oral <User Schedule>  senna 2 Tablet(s) Oral at bedtime    MEDICATIONS  (PRN):  acetaminophen     Tablet .. 650 milliGRAM(s) Oral every 6 hours PRN Temp greater or equal to 38C (100.4F), Mild Pain (1 - 3)  fluticasone propionate 50 MICROgram(s)/spray Nasal Spray 1 Spray(s) Both Nostrils two times a day PRN Congestion  guaiFENesin Oral Liquid (Sugar-Free) 100 milliGRAM(s) Oral every 6 hours PRN Cough      I&O's Summary      Vital Signs Last 24 Hrs  T(C): 36.3 (08 Mar 2023 20:58), Max: 36.6 (08 Mar 2023 05:14)  T(F): 97.3 (08 Mar 2023 20:58), Max: 97.8 (08 Mar 2023 05:14)  HR: 77 (08 Mar 2023 20:58) (63 - 77)  BP: 119/82 (08 Mar 2023 20:58) (113/68 - 120/69)  BP(mean): --  RR: 17 (08 Mar 2023 20:58) (17 - 17)  SpO2: 100% (08 Mar 2023 20:58) (99% - 100%)    Parameters below as of 08 Mar 2023 20:58  Patient On (Oxygen Delivery Method): room air        CAPILLARY BLOOD GLUCOSE          PHYSICAL EXAM:  GENERAL: NAD, sleeping comfortably  NEURO: Alert and Oriented to person, place, time. Pupils symmetric, No ptosis. No facial asymmetry or dysarthria, no tremor noted.  HEENT: No conjunctival injection or scleral icterus.   CARD: Normal rate and regular rhythm, no murmurs and no gallops appreciated.  RESP: Clear to auscultation bilaterally, No wheezes, rales or rhonchi. Good respiratory effort.  ABD: Nondistended, Soft and nontender to palpation in all quadrants, no guarding, no rigidity.   EXT: mild nonpitting ankle edema bilaterally, nontender non erythematous            LABS:    03-07    140  |  107  |  23  ----------------------------<  90  4.0   |  21<L>  |  1.33<H>    Ca    9.5      07 Mar 2023 08:11  Mg     2.10     03-07  Phos  4.1     03-07                  RADIOLOGY & ADDITIONAL TESTS:    Imaging Personally Reviewed:    Consultant(s) Notes Reviewed:      Care Discussed with Consultants/Other Providers:

## 2023-03-10 VITALS
OXYGEN SATURATION: 100 % | DIASTOLIC BLOOD PRESSURE: 82 MMHG | HEART RATE: 75 BPM | TEMPERATURE: 98 F | SYSTOLIC BLOOD PRESSURE: 117 MMHG | RESPIRATION RATE: 18 BRPM

## 2023-03-10 PROCEDURE — 99239 HOSP IP/OBS DSCHRG MGMT >30: CPT | Mod: GC

## 2023-03-10 RX ADMIN — LIDOCAINE 1 PATCH: 4 CREAM TOPICAL at 12:14

## 2023-03-10 RX ADMIN — Medication 125 MICROGRAM(S): at 06:34

## 2023-03-10 RX ADMIN — RISPERIDONE 1 MILLIGRAM(S): 4 TABLET ORAL at 06:33

## 2023-03-10 RX ADMIN — Medication 0.5 MILLIGRAM(S): at 06:33

## 2023-03-10 RX ADMIN — POLYETHYLENE GLYCOL 3350 17 GRAM(S): 17 POWDER, FOR SOLUTION ORAL at 12:14

## 2023-03-10 RX ADMIN — LIDOCAINE 1 PATCH: 4 CREAM TOPICAL at 00:22

## 2023-03-10 NOTE — DISCHARGE NOTE NURSING/CASE MANAGEMENT/SOCIAL WORK - PATIENT PORTAL LINK FT
You can access the FollowMyHealth Patient Portal offered by [facility] by registering at the following website: [email or web address]. By joining [employer]’s FollowMyHealth portal, you will also be able to view your health information using other applications (apps) compatible with our system.

## 2023-03-10 NOTE — PROGRESS NOTE ADULT - ATTENDING COMMENTS
52F previously domiciled with recently  mother PMH bipolar disorder, hypothyroidism BIBEMS for abnormal behavior reported by cousin. Admitted for acute encephalopathy in the setting of psychosis due to lithium nonadherence with course c/b acute renal failure (no baseline cr)  and severe hypothyroidism (TSH 400s on admission). Course further c/b +COVID but saturating well on RA and currently asymptomatic. S/p isolation. Stable and pending placement arrangements.     C/w cogentin and risperidone.  Patient has been stable and calm. She is optimized for discharge. D/w SW/CM and discharge now planned for today. Further details outlined in discharge documentation. Spent 37 min in discharge planning and coordination.

## 2023-03-10 NOTE — PROGRESS NOTE ADULT - PROBLEM SELECTOR PROBLEM 1
Bipolar disorder
Acute encephalopathy
Bipolar disorder
Acute encephalopathy
Bipolar disorder
Bipolar disorder
Severe hypothyroidism
Acute encephalopathy
Acute encephalopathy
Bipolar disorder
Severe hypothyroidism
Severe hypothyroidism
Bipolar disorder
Bipolar disorder
Severe hypothyroidism
Severe hypothyroidism
Bipolar disorder
Hypothyroidism
Acute encephalopathy
Acute encephalopathy
Bipolar disorder
Hypothyroid
Acute encephalopathy
Bipolar disorder
Bipolar disorder
Acute encephalopathy
Bipolar disorder
Hypothyroid
Hypothyroid
Bipolar disorder

## 2023-03-10 NOTE — PROGRESS NOTE ADULT - SUBJECTIVE AND OBJECTIVE BOX
Sultan Kevin MD  PGY 1 Department of Internal Medicine        Patient is a 53y old  Female who presents with a chief complaint of behavioral change; abnormal TFT (09 Mar 2023 05:11)      SUBJECTIVE / OVERNIGHT EVENTS: Pt seen and examined. No acute overnight events. Denies fevers, chills, CP, SOB, Abdominal pain, N/V, Constipation, Diarrhea        MEDICATIONS  (STANDING):  benztropine 0.5 milliGRAM(s) Oral two times a day  levothyroxine 125 MICROGram(s) Oral daily  lidocaine   4% Patch 1 Patch Transdermal daily  polyethylene glycol 3350 17 Gram(s) Oral daily  risperiDONE   Tablet 1 milliGRAM(s) Oral <User Schedule>  risperiDONE   Tablet 1 milliGRAM(s) Oral <User Schedule>  senna 2 Tablet(s) Oral at bedtime    MEDICATIONS  (PRN):  acetaminophen     Tablet .. 650 milliGRAM(s) Oral every 6 hours PRN Temp greater or equal to 38C (100.4F), Mild Pain (1 - 3)  fluticasone propionate 50 MICROgram(s)/spray Nasal Spray 1 Spray(s) Both Nostrils two times a day PRN Congestion  guaiFENesin Oral Liquid (Sugar-Free) 100 milliGRAM(s) Oral every 6 hours PRN Cough      I&O's Summary      Vital Signs Last 24 Hrs  T(C): 36.4 (09 Mar 2023 21:04), Max: 36.4 (09 Mar 2023 21:04)  T(F): 97.5 (09 Mar 2023 21:04), Max: 97.5 (09 Mar 2023 21:04)  HR: 75 (09 Mar 2023 21:04) (73 - 75)  BP: 121/84 (09 Mar 2023 21:04) (108/65 - 121/84)  BP(mean): --  RR: 18 (09 Mar 2023 21:04) (17 - 18)  SpO2: 100% (09 Mar 2023 21:04) (100% - 100%)    Parameters below as of 09 Mar 2023 21:04  Patient On (Oxygen Delivery Method): room air        CAPILLARY BLOOD GLUCOSE          PHYSICAL EXAM:  GENERAL: NAD, sleeping comfortably  NEURO: Alert and Oriented to person, place, time. Pupils symmetric, No ptosis. No facial asymmetry or dysarthria, no tremor noted.  HEENT: No conjunctival injection or scleral icterus.   CARD: Normal rate and regular rhythm, no murmurs and no gallops appreciated.  RESP: Clear to auscultation bilaterally, No wheezes, rales or rhonchi. Good respiratory effort.  ABD: Nondistended, Soft and nontender to palpation in all quadrants, no guarding, no rigidity.   EXT: mild nonpitting ankle edema bilaterally, nontender non erythematous       LABS:                      RADIOLOGY & ADDITIONAL TESTS:    Imaging Personally Reviewed:    Consultant(s) Notes Reviewed:      Care Discussed with Consultants/Other Providers:   Sultan Kevin MD  PGY 1 Department of Internal Medicine        Patient is a 53y old  Female who presents with a chief complaint of behavioral change; abnormal TFT (09 Mar 2023 05:11)      SUBJECTIVE / OVERNIGHT EVENTS: Pt seen and examined. No acute overnight events. Denies fevers, chills, CP, SOB, Abdominal pain, N/V, Constipation, Diarrhea. Feels well overall.         MEDICATIONS  (STANDING):  benztropine 0.5 milliGRAM(s) Oral two times a day  levothyroxine 125 MICROGram(s) Oral daily  lidocaine   4% Patch 1 Patch Transdermal daily  polyethylene glycol 3350 17 Gram(s) Oral daily  risperiDONE   Tablet 1 milliGRAM(s) Oral <User Schedule>  risperiDONE   Tablet 1 milliGRAM(s) Oral <User Schedule>  senna 2 Tablet(s) Oral at bedtime    MEDICATIONS  (PRN):  acetaminophen     Tablet .. 650 milliGRAM(s) Oral every 6 hours PRN Temp greater or equal to 38C (100.4F), Mild Pain (1 - 3)  fluticasone propionate 50 MICROgram(s)/spray Nasal Spray 1 Spray(s) Both Nostrils two times a day PRN Congestion  guaiFENesin Oral Liquid (Sugar-Free) 100 milliGRAM(s) Oral every 6 hours PRN Cough      I&O's Summary      Vital Signs Last 24 Hrs  T(C): 36.4 (09 Mar 2023 21:04), Max: 36.4 (09 Mar 2023 21:04)  T(F): 97.5 (09 Mar 2023 21:04), Max: 97.5 (09 Mar 2023 21:04)  HR: 75 (09 Mar 2023 21:04) (73 - 75)  BP: 121/84 (09 Mar 2023 21:04) (108/65 - 121/84)  BP(mean): --  RR: 18 (09 Mar 2023 21:04) (17 - 18)  SpO2: 100% (09 Mar 2023 21:04) (100% - 100%)    Parameters below as of 09 Mar 2023 21:04  Patient On (Oxygen Delivery Method): room air        CAPILLARY BLOOD GLUCOSE          PHYSICAL EXAM:  GENERAL: NAD, sleeping comfortably  NEURO: Alert and Oriented to person, place, time. Pupils symmetric, No ptosis. No facial asymmetry or dysarthria, no tremor noted.  HEENT: No conjunctival injection or scleral icterus.   CARD: Normal rate and regular rhythm, no murmurs and no gallops appreciated.  RESP: Clear to auscultation bilaterally, No wheezes, rales or rhonchi. Good respiratory effort.  ABD: Nondistended, Soft and nontender to palpation in all quadrants, no guarding, no rigidity.   EXT: mild nonpitting ankle edema bilaterally, nontender non erythematous       LABS:                      RADIOLOGY & ADDITIONAL TESTS:    Imaging Personally Reviewed:    Consultant(s) Notes Reviewed:      Care Discussed with Consultants/Other Providers:

## 2023-03-10 NOTE — DISCHARGE NOTE NURSING/CASE MANAGEMENT/SOCIAL WORK - NSDCFUADDAPPT_GEN_ALL_CORE_FT
Please follow up with your PCP within 1-2 weeks. We will provide the information of our medicine clinic if you would like to follow up with them.   Please follow up with psych within 1-2 weeks  Please follow up with endocrinology within 1 week (week of 3/13) to recheck thyroid function tests.

## 2023-03-10 NOTE — PROGRESS NOTE ADULT - PROBLEM/PLAN-6
What Type Of Note Output Would You Prefer (Optional)?: Bullet Format
DISPLAY PLAN FREE TEXT
How Severe Are Your Spot(S)?: mild
DISPLAY PLAN FREE TEXT
Have Your Spot(S) Been Treated In The Past?: has been treated
DISPLAY PLAN FREE TEXT
Hpi Title: Evaluation of Skin Lesions
DISPLAY PLAN FREE TEXT

## 2023-03-10 NOTE — PROGRESS NOTE ADULT - PROBLEM SELECTOR PLAN 1
She has bipolar disorder previously on lithium but unclear adherence prior to admission as serum levels low.  - Risperidone 1 mg bid (7 am & 8 pm). Benztropine 0.5 bid for EPS  - Haldol 2 mg IV/IM/p.o. q6h prn for agitation/aggression (last required x1 on 1/30) -> discontinued  - QTc 430s after increasing risperidone dose.  - weekly EKG:  3/3 QTc < 450  - Psychiatry following, recs appreciated -> on 3/2 noting she is not actively psychotic but still does not have capacity/insight She has bipolar disorder previously on lithium but unclear adherence prior to admission as serum levels low.  - Risperidone 1 mg bid (7 am & 8 pm). Benztropine 0.5 bid for EPS  - Haldol 2 mg IV/IM/p.o. q6h prn for agitation/aggression (last required x1 on 1/30) -> discontinued  - QTc 430s after increasing risperidone dose.  - weekly EKG:  3/9 QTc < 450  - Psychiatry following, recs appreciated -> on 3/2 noting she is not actively psychotic but still does not have capacity/insight

## 2023-03-10 NOTE — DISCHARGE NOTE NURSING/CASE MANAGEMENT/SOCIAL WORK - NSDCPEFALRISK_GEN_ALL_CORE
For information on Fall & Injury Prevention, visit: [email or web address] OR  [email or web address] OR  https://www.cdc.gov/steadi/patient.html

## 2023-03-10 NOTE — PROGRESS NOTE ADULT - ATTENDING SUPERVISION STATEMENT
Resident

## 2023-03-10 NOTE — PROGRESS NOTE ADULT - REASON FOR ADMISSION
behavioral change; abnormal TFT

## 2023-06-01 NOTE — PROGRESS NOTE ADULT - ASSESSMENT
Spoke to pt,let pt know that there is no availability until late August with . Pt said is too late and will like something sooner. Pt said she will send another message to  and Avril to request sooner appt.    52F with bipolar disorder, hypothyroidism, admitted for acute encephalopathy most likely in the setting of psychosis due to lithium nonadherence with course complicated by acute renal failure and hypothyroidism. Now on risperidone and pending placement to group home.

## 2023-12-08 NOTE — PROGRESS NOTE ADULT - SUBJECTIVE AND OBJECTIVE BOX
"See initial assessment question responses. Pt does not believe the sudden appearance, onset yesterday, of nickel-sized painful hard lump near rectum is a hemorrhoid. Pt believes it is an abscess starting. Pt is unable to visualize the area well, but states the lump is red.    Pt does mention that on Tuesday she had a fever of 101 degrees, and has a stuffy nose, but states fever has resolved. Pt did not yet take a COVID-19 test. Pt was advised to take an at-home COVID test and notify staff of result so proper precautions can be taken if the fever was indicative of a recent COVID infection. Pt indicates understanding of issues and agrees with the plan and states she does have an at-home COVID test available to test now.    RN contacted [employer] and spoke with Michelle. Michelle huddled with provider and replied that pt can be seen today at [facility] for assessment of the painful lump near rectum. Pt will be contacted by [facility] staff for scheduling today.    Reason for Disposition   Spreading redness around the boil and no fever    Additional Information   Negative: Widespread rash and bright red, sunburn-like and too weak to stand   Negative: Sounds like a life-threatening emergency to the triager   Negative: Black (necrotic), dark purple, or blisters develop in area of wound   Negative: Patient sounds very sick or weak to the triager   Negative: SEVERE pain (e.g., excruciating)   Negative: Red streak from area of infection   Negative: Fever > 100.4 F (38.0 C)   Negative: Widespread red rash    Answer Assessment - Initial Assessment Questions  1. APPEARANCE of BOIL: \"What does the boil look like?\"       Started out as a pimple and then yesterday turned into a fernanda-sized hard lump that is painful to touch. Looks red. There was some blood when wiping after having a BM today, unsure if blood came from the mass or other area.    2. LOCATION: \"Where is the boil located?\"       Near rectum    3. NUMBER: \"How many " "boils are there?\"       1    4. SIZE: \"How big is the boil?\" (e.g., inches, cm; compare to size of a coin or other object)      Nickel-sized     5. ONSET: \"When did the boil start?\"      Pimple-like area may have been present for weeks or months with sudden change in size and pain.    6. PAIN: \"Is there any pain?\" If Yes, ask: \"How bad is the pain?\"   (Scale 1-10; or mild, moderate, severe)      Pain #3/10 when touched or having BMs.    7. FEVER: \"Do you have a fever?\" If Yes, ask: \"What is it, how was it measured, and when did it start?\"       Pt states she did have a fever earlier this week and has resolved. Pt states she currently has a runny nose and stuffed up.    8. SOURCE: \"Have you been around anyone with boils or other Staph infections?\" \"Have you ever had boils before?\"      No    9. OTHER SYMPTOMS: \"Do you have any other symptoms?\" (e.g., shaking chills, weakness, rash elsewhere on body)      Stuffy nose    Protocols used: Boil (Skin Abscess)-A-OH    NERIS Lorenzo, RN    " Subjective:    INTERVAL HPI/OVERNIGHT EVENTS:   No acute events overnight  Afebrile, hemodynamically stable   - still on room air, with some cough     Telemetry:    T(F): 97.6 (02-03-23 @ 20:36), Max: 97.7 (02-03-23 @ 12:57)  HR: 97 (02-03-23 @ 20:36) (97 - 97)  BP: 127/84 (02-03-23 @ 20:36) (127/84 - 128/85)  RR: 18 (02-03-23 @ 20:36) (18 - 18)  SpO2: 97% (02-03-23 @ 20:36) (97% - 97%)  I&O's Summary    03 Feb 2023 07:01  -  04 Feb 2023 07:00  --------------------------------------------------------  IN: 3450 mL / OUT: 1750 mL / NET: 1700 mL          Medications:  acetaminophen     Tablet .. 650 milliGRAM(s) Oral every 6 hours PRN  benztropine 0.5 milliGRAM(s) Oral two times a day  guaiFENesin Oral Liquid (Sugar-Free) 100 milliGRAM(s) Oral every 6 hours PRN  haloperidol    Injectable 2 milliGRAM(s) IV Push every 6 hours PRN  heparin   Injectable 5000 Unit(s) SubCutaneous every 8 hours  levothyroxine 125 MICROGram(s) Oral daily  risperiDONE   Tablet 1 milliGRAM(s) Oral <User Schedule>  risperiDONE   Tablet 1 milliGRAM(s) Oral <User Schedule>    PHYSICAL EXAM:  GENERAL: No apparent distress  EYES: sclera clear  CHEST/LUNG: Clear to auscultation bilaterally; No wheeze, rales, rhonchi  HEART: Regular rate and rhythm; No murmurs, rubs, or gallops  ABDOMEN: Soft, nontender, nondistended; Bowel sounds present  EXTREMITIES:  2+ peripheral pulses; No clubbing, cyanosis, or edema  PSYCH: AAOx3, withdrawn, flat affect  NEUROLOGY: No focal deficits.  SKIN: No rashes or lesions    Notable Labs:    Labs:                          10.5   4.83  )-----------( 180      ( 03 Feb 2023 05:55 )             34.0     02-03    140  |  107  |  21  ----------------------------<  83  4.2   |  22  |  1.52<H>    Ca    9.2      03 Feb 2023 05:55  Phos  3.6     02-03  Mg     1.80     02-03    TPro  6.7  /  Alb  3.7  /  TBili  <0.2  /  DBili  x   /  AST  20  /  ALT  16  /  AlkPhos  98  02-03    LIVER FUNCTIONS - ( 03 Feb 2023 05:55 )  Alb: 3.7 g/dL / Pro: 6.7 g/dL / ALK PHOS: 98 U/L / ALT: 16 U/L / AST: 20 U/L / GGT: x             CAPILLARY BLOOD GLUCOSE      Micro:      RADIOLOGY & ADDITIONAL TESTS:    NNI Subjective:    INTERVAL HPI/OVERNIGHT EVENTS:   No acute events overnight  Afebrile, hemodynamically stable   - still on room air, with some cough   - calm and pleasant this AM.     Telemetry:    T(F): 97.6 (02-03-23 @ 20:36), Max: 97.7 (02-03-23 @ 12:57)  HR: 97 (02-03-23 @ 20:36) (97 - 97)  BP: 127/84 (02-03-23 @ 20:36) (127/84 - 128/85)  RR: 18 (02-03-23 @ 20:36) (18 - 18)  SpO2: 97% (02-03-23 @ 20:36) (97% - 97%)  I&O's Summary    03 Feb 2023 07:01  -  04 Feb 2023 07:00  --------------------------------------------------------  IN: 3450 mL / OUT: 1750 mL / NET: 1700 mL          Medications:  acetaminophen     Tablet .. 650 milliGRAM(s) Oral every 6 hours PRN  benztropine 0.5 milliGRAM(s) Oral two times a day  guaiFENesin Oral Liquid (Sugar-Free) 100 milliGRAM(s) Oral every 6 hours PRN  haloperidol    Injectable 2 milliGRAM(s) IV Push every 6 hours PRN  heparin   Injectable 5000 Unit(s) SubCutaneous every 8 hours  levothyroxine 125 MICROGram(s) Oral daily  risperiDONE   Tablet 1 milliGRAM(s) Oral <User Schedule>  risperiDONE   Tablet 1 milliGRAM(s) Oral <User Schedule>    PHYSICAL EXAM:  GENERAL: No apparent distress  EYES: sclera clear  CHEST/LUNG: Clear to auscultation bilaterally; No wheeze, rales, rhonchi  HEART: Regular rate and rhythm; No murmurs, rubs, or gallops  ABDOMEN: Soft, nontender, nondistended; Bowel sounds present  EXTREMITIES:  2+ peripheral pulses; No clubbing, cyanosis, or edema  PSYCH: AAOx3, withdrawn, flat affect  NEUROLOGY: No focal deficits.  SKIN: No rashes or lesions    Notable Labs:    Labs:                          10.5   4.83  )-----------( 180      ( 03 Feb 2023 05:55 )             34.0     02-03    140  |  107  |  21  ----------------------------<  83  4.2   |  22  |  1.52<H>    Ca    9.2      03 Feb 2023 05:55  Phos  3.6     02-03  Mg     1.80     02-03    TPro  6.7  /  Alb  3.7  /  TBili  <0.2  /  DBili  x   /  AST  20  /  ALT  16  /  AlkPhos  98  02-03    LIVER FUNCTIONS - ( 03 Feb 2023 05:55 )  Alb: 3.7 g/dL / Pro: 6.7 g/dL / ALK PHOS: 98 U/L / ALT: 16 U/L / AST: 20 U/L / GGT: x             CAPILLARY BLOOD GLUCOSE      Micro:      RADIOLOGY & ADDITIONAL TESTS:    NNI

## 2024-09-25 NOTE — PROGRESS NOTE ADULT - PROBLEM SELECTOR PLAN 5
Reportedly prescribed rosuvastatin 10 mg p.o. q.d.  - Will need outpatient lipid panel once thyroid function tests have stabilized prior to re-initiation 24-Sep-2024 20:30

## 2025-01-09 NOTE — ED BEHAVIORAL HEALTH ASSESSMENT NOTE - PSYCHIATRIC ISSUES AND PLAN (INCLUDE STANDING AND PRN MEDICATION)
Reason for call:   [x] Refill   [] Prior Auth  [] Other:     Office:   [x] PCP/Provider - Vane Mg PA-C   [] Specialty/Provider -     Medication: Methylphenidate HCl ER, PM, 100 MG     Dose/Frequency: 1 tab daily    Quantity: 30 caps    Pharmacy: [facility]/pharmacy #[id number] - [place] - [address]      Does the patient have enough for 3 days?   [] Yes   [x] No - Send as HP to POD     bipolar disorder-lithium

## 2025-04-12 NOTE — BH CONSULTATION LIAISON PROGRESS NOTE - NSBHADMITIPOBS_PSY_A_CORE
Routine observation
No
Routine observation
Enhanced supervision
Routine observation
